# Patient Record
Sex: MALE | Race: WHITE | Employment: UNEMPLOYED | URBAN - METROPOLITAN AREA
[De-identification: names, ages, dates, MRNs, and addresses within clinical notes are randomized per-mention and may not be internally consistent; named-entity substitution may affect disease eponyms.]

---

## 2017-04-28 ENCOUNTER — HOSPITAL ENCOUNTER (OUTPATIENT)
Dept: PHYSICAL THERAPY | Age: 42
Discharge: HOME | End: 2017-04-28
Payer: MEDICAID

## 2017-04-28 PROCEDURE — 97163 PT EVAL HIGH COMPLEX 45 MIN: CPT

## 2017-04-28 PROCEDURE — 97530 THERAPEUTIC ACTIVITIES: CPT

## 2017-04-28 NOTE — PROGRESS NOTES
PHYSICAL THERAPY - DAILY TREATMENT NOTE    Patient Name: Collette Nazario. Date: 2017  : 1975   YES Patient  Verified  Visit #:     Insurance: Payor: [address] [place] [address] / Plan:     / Product Type: Medicaid /      In time: 1:05 Out time: 1:55   Total Treatment Time: 50     Medicare Time Tracking (below)   Total Timed Codes (min):  50 1:1 Treatment Time:  50     TREATMENT AREA =  CVA    SUBJECTIVE    Pain Level (on 0 to 10 scale):  3  / 10   Medication Changes/New allergies or changes in medical history, any new surgeries or procedures? NO    If yes, update Summary List   Subjective Functional Status/Changes:  []  No changes reported     Reports he had a CVA on 2016. States he spent 10 days in [facility] for ~ 80 days (acute care), [facility] X 10 days, and then to [facility] from 2016 to 3/8/2017. Has had HHPT/HHOT until last week. States he is walking short distances at home with someone assisting him. Living with parents now in a 1 story home with ramp to enter. 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 GOAL:  To get motion in L LE and R UE and to get balance back. Reports 3 falls since CVA (2 times at [facility] and once at home when transfering)  Reports he needs assistance with transfers and dressing. Has a transfer bench, hemiwalker, and AFO. Has an aide 6 hours/day. Reports he has noticed increased movement since returning home.           OBJECTIVE    Physical Therapy Evaluation - Neurologic    Posture: [] Poor    [x] Fair    [] Good    Describe:       Gait: [] Normal    [x] Abnormal    Device:  HW    Describe: decreased stride length, trunk flexion/rotation, decreased L hip flexor activity, decreased heel/toe    ROM:                                       AROM    PROM   Knee Left Right Left Right   Ext dec [facility]   Flex Dec [facility] AROM                           PROM  Hip Left Right Left Right   Flex dec [facility] WFL   Ext dec [facility] WFL   ABD dec [facility] [place]   ER dec [facility] WFL WFL   IR dec WFL WFL WFL                                            AROM      PROM   Ankle Left Right Left Right   Ext NT [facility] WFL   Flex NT [facility] WFL     Strength (MMT):                                          Hip L (1-5) R (1-5)   Hip Flexion 0 4   Hip Ext     Hip ABD 0 4   Hip ADD     Hip ER     Hip IR       Knee L (1-5) R (1-5)   Knee Flexion 1 4   Knee Extension 1 4   Ankle PF     Ankle DF     Other       Tone: dec    Motor Control: dec    Balance/ Equilibrium:     Eyes Open Eyes Closed   Romberg 30\" NT           Functional Mobility      Bed Mobility:      Scooting: I       Rolling: mod I       Sit-Supine: Min A for LLE      Transfers:       Sit-Stand: CGA       Floor-Stand: NT           Behavior: [x] Cooperative    [] Impulsive    [] Agitated    [] Perseverative    [] Confused   Oriented x:    Cognition: [] One Step Commands   [x] Multiple Commands   [] Displays Neglect [] R  [] L    Other:       Impaired Judgement: [] Y    [x] N      Impaired Vision:  [x] Y    [] N      Safety Awareness Deficits  [] Y    [x] N      Impaired Hearing  [] Y    [x] N      Able to Express Needs [x] Y    [] N    Optional Tests:       Tinetti Assessment Tool: 10/28      Other Objective/Functional Measures:    See eval    There Act (10 min):  FOTO = 21   Post Treatment Pain Level (on 0 to 10) scale:   3  / 10     ASSESSMENT  Assessment/Changes in Function:     Justification for Eval Code Complexity:  Patient History (low 0, mod 1-2, high 3-4): high (depression, HTN, visual impairments)  Examination (low 1-2, mod 3+, high 4+): high (see above)  Clinical Presentation (low stable or uncomplicated, mod evolving or changing, high unstable or unpredictable): high  Clinical Decision Making (low , mod 26-74, high 1-25): FOTO = 21 high     []  See Progress Note/Recertification Patient will continue to benefit from skilled PT services to modify and progress therapeutic interventions, address functional mobility deficits, address ROM deficits, address strength deficits, analyze and address soft tissue restrictions, analyze and cue movement patterns, analyze and modify body mechanics/ergonomics, assess and modify postural abnormalities, address imbalance/dizziness and instruct in home and community integration to attain remaining goals. Progress toward goals / Updated goals  Goals established. PLAN    [x]  Upgrade activities as tolerated YES Continue plan of care   []  Discharge due to :    []  Other:      Therapist: Luca Meyer, PT    Date: 4/28/2017 Time: 1:11 PM     No future appointments.

## 2017-04-28 NOTE — PROGRESS NOTES
[facility] PHYSICAL THERAPY  [address] [place] [address] Marco A Jacobs, [address] - Phone: [phone]  Fax: [phone] / [phone]  Patient Name: Marcela Goff. 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   ===========================================================================================  Assessment / key information:  Patient is a 35y.o. year old male with chief complaint of immobility following CVA on 2016. Patient reports a history of inpatient rehab at [facility] and at [facility], [facility]. rehab (ending in 3/2017). Patient with significantly impaired use of L UE/LE (strength measurements listed below). He has good dynamic and static sitting balance. He performs supine <> sit transfer with min A for L LE, sit to stand transfer with CGA, WC <> mat transfer with CGA, mod I with rolling to either side. He is able to stand for 30\" without UE support. Gait abnormalities include: anterior lean, decreased stride length, trunk flexion and rotation, decreased activity with L hip flexors, and decreased heel strike on L LE. Tinetti Assessment score = 10/28, indicating high fall risk. Patient with a Functional Status score of 21 on FOTO (Focused on Therapeutic Outcomes), which corresponds to a functional limitation of 79%.   Patient will benefit from skilled PT services to address these issues. Strength (MMT):   Hip L (1-5) R (1-5)   Hip Flexion 0 4   Hip Ext       Hip ABD 0 4   Hip ADD       Hip ER       Hip IR          Knee L (1-5) R (1-5)   Knee Flexion 1 4   Knee Extension 1 4       ===========================================================================================  Eval Complexity: History: HIGH Complexity :3+ comorbidities / personal factors will impact the outcome/ POC Exam:HIGH Complexity : 4+ Standardized tests and measures addressing body structure, function, activity limitation and / or participation in recreation  Presentation: HIGH Complexity : Unstable and unpredictable characteristics  Clinical Decision Making:HIGH Complexity : FOTO score of 1- 25 Overall Complexity:HIGH     Problem List: pain affecting function, decrease ROM, decrease strength, impaired gait/ balance, decrease ADL/ functional abilitiies, decrease activity tolerance, decrease flexibility/ joint mobility and decrease transfer abilities   Treatment Plan may include any combination of the following: Therapeutic exercise, Therapeutic activities, Neuromuscular re-education, Physical agent/modality, Gait/balance training, Manual therapy, Aquatic therapy, Patient education, Self Care training, Functional mobility training, Home safety training and Stair training  Patient / Family readiness to learn indicated by: asking questions, trying to perform skills and interest  Persons(s) to be included in education: patient (P) and family support person (FSP);list father  Barriers to Learning/Limitations: None  Measures taken:    Patient Goal (s): \"be able to use L arm and leg\"   Patient self reported health status: fair  Rehabilitation Potential: good   Short Term Goals: To be accomplished in  2-4  weeks:  1. Patient will ambulate 100 feet with LRAD and no safety concerns to improve safety with household ambulation. 2.  Increase Tinetti score to 12/28 to decrease fall risk.   3.  Patient will be compliant with home exercise program.   Long Term Goals: To be accomplished in  6-8  weeks:  1. Patient will ambulate 250 feet with LRAD and no safety concerns to improve safety with community ambulation. 2.  Increase Tinetti score to 14/28 to decrease fall risk. 3.  Patient will be independent with home exercise program.  4.  Patient will increase FOTO Functional Status score to 47 to indicate decreased functional limitations. Frequency / Duration:   Patient to be seen  2-3  times per week for 4-8  weeks:  Patient / Caregiver education and instruction: self care  G-Codes (GP): MARKIE  Therapist Signature: Isra Manley PT Date: 3/32/0248   Certification Period: NA Time: 2:08 PM   ===========================================================================================  I certify that the above Physical Therapy Services are being furnished while the patient is under my care. I agree with the treatment plan and certify that this therapy is necessary. Physician Signature:        Date:       Time:     Please sign and return to [facility] or you may fax the signed copy to [phone]. Thank you.

## 2017-05-05 ENCOUNTER — APPOINTMENT (OUTPATIENT)
Dept: PHYSICAL THERAPY | Age: 42
End: 2017-05-05
Payer: COMMERCIAL

## 2017-05-09 ENCOUNTER — HOSPITAL ENCOUNTER (OUTPATIENT)
Dept: PHYSICAL THERAPY | Age: 42
Discharge: HOME | End: 2017-05-09
Payer: COMMERCIAL

## 2017-05-09 PROCEDURE — 97116 GAIT TRAINING THERAPY: CPT

## 2017-05-09 PROCEDURE — 97110 THERAPEUTIC EXERCISES: CPT

## 2017-05-09 NOTE — PROGRESS NOTES
PHYSICAL THERAPY - DAILY TREATMENT NOTE    Patient Name: Sharlene Luis. Date: 2017  : 1975   YES Patient  Verified  Visit #:   2   of   12  Insurance: Payor: MEDICAID [facility] / Plan: 1500 / Product Type: Medicaid /      In time: 2:05 Out time: 2:35   Total Treatment Time: 30     Medicare Time Tracking (below)   Total Timed Codes (min):  NA 1:1 Treatment Time:  NA     TREATMENT AREA =  Gait instability [R26.81]  Stroke (cerebrum) ([facility] [I63.9]  SUBJECTIVE    Pain Level (on 0 to 10 scale):  4  / 10   Medication Changes/New allergies or changes in medical history, any new surgeries or procedures? NO    If yes, update Summary List   Subjective Functional Status/Changes:  []  No changes reported     Patient and patient's father report that he has been doing exercises prescribed by HHPT. OBJECTIVE    20 min Therapeutic Exercise:  [x]  See flow sheet   Rationale:      increase ROM, increase strength, improve coordination, improve balance and increase proprioception to improve the patients ability to perform ADLs/IADLs, functional mobility and gait safely and independently without increased pain/symptoms     10 min Gait Training: Amb with HW 80' x 2 with CG   Rationale: improve gait to improve the patient's ability to perform ADLs/IADLs, functional mobility and gait safely and independently without increased pain/symptoms        During TE min Patient Education:  YES  Reviewed HEP   []  Progressed/Changed HEP based on:   Issued HEP (copy in chart)     Other Objective/Functional Measures:     Added several exercises per flowsheet - required A due to L LE weakness  Focused on symmetrical WB with sit to stand     Post Treatment Pain Level (on 0 to 10) scale:   4  / 10     ASSESSMENT    Assessment/Changes in Function:     Tolerated exercises without complaints     []  See Progress Note/Recertification   Patient will continue to benefit from skilled PT services to modify and progress therapeutic interventions, address functional mobility deficits, address ROM deficits, address strength deficits, analyze and address soft tissue restrictions, analyze and cue movement patterns, analyze and modify body mechanics/ergonomics, assess and modify postural abnormalities, address imbalance/dizziness and instruct in home and community integration to attain remaining goals. Progress toward goals / Updated goals:    Progressing toward goals - first visit since evaluation;  1. Patient will ambulate 100 feet with LRAD and no safety concerns to improve safety with household ambulation. - Amb 80' x 2 with HW with CG  2. Increase Tinetti score to 12/28 to decrease fall risk. - Initiated functional mobility/gait training  3.  Patient will be compliant with home exercise program. - Initiated HEP     PLAN    [x]  Upgrade activities as tolerated YES Continue plan of care   []  Discharge due to :    []  Other:      Therapist: Juana Harrington PT    Date: 5/9/2017 Time: 2:51 PM     Future Appointments  Date Time Provider Nanda Pappas   5/12/2017 9:00 AM [address] [place] [address]   5/17/2017 3:30 PM Juana Harrington PT [facility]   5/22/2017 11:30 AM [address] [place] [address]   5/24/2017 10:30 AM Juana Harrington PT [facility]   5/25/2017 1:00 PM ELIOT Bradford/L [facility]   5/31/2017 11:00 AM [address] [place] [address]

## 2017-05-12 ENCOUNTER — HOSPITAL ENCOUNTER (OUTPATIENT)
Dept: PHYSICAL THERAPY | Age: 42
Discharge: HOME | End: 2017-05-12
Payer: COMMERCIAL

## 2017-05-12 PROCEDURE — 97110 THERAPEUTIC EXERCISES: CPT

## 2017-05-12 PROCEDURE — 97530 THERAPEUTIC ACTIVITIES: CPT

## 2017-05-12 NOTE — PROGRESS NOTES
PHYSICAL THERAPY - DAILY TREATMENT NOTE    Patient Name: Sharlene Luis. Date: 2017  : 1975   YES Patient  Verified  Visit #:   3   of   12  Insurance: Payor: MEDICAID [facility] / Plan: 1500 / Product Type: Medicaid /      In time: 9:05 Out time: 9:45   Total Treatment Time: 40     Medicare Time Tracking (below)   Total Timed Codes (min):  na 1:1 Treatment Time:  na     TREATMENT AREA =  Gait instability [R26.81]  Stroke (cerebrum) ([facility]) [I63.9]    SUBJECTIVE    Pain Level (on 0 to 10 scale):  4  / 10   Medication Changes/New allergies or changes in medical history, any new surgeries or procedures? NO    If yes, update Summary List   Subjective Functional Status/Changes:  []  No changes reported     Pt states that his L knee has been really sore for the past couple of days, not sure if it was from the PT exercises or from where his knee buckled on him when he was walking. OBJECTIVE    25 min Therapeutic Exercise:  [x]  See flow sheet   Rationale:     increase ROM, increase strength, improve coordination, improve balance and increase proprioception to promote increased functional mobility and increased activity tolerance with ADL's. 15 min Therapeutic Activity: Gait 61' with HW and SBA   Standing without UE A  Standing WSing   Rationale:   improve coordination, improve balance and increase proprioception to improve the patients ability to perform ADLs, transfers and gait safely and independently. min Patient Education:  YES  Reviewed HEP   [x]  Progressed/Changed HEP based on: Added gastroc stretch to HEP     Other Objective/Functional Measures:    Cued pt for L knee extension with stance phase of gait and with standing ex. Attempted TKE in standing, pt unable to achieve TKE. Increased height to 4\" for tap ups. Pt required 1 UE A for balance. Added PNF in supine. Pt's L knee flexion limited secondary to ms tightness/tone.      Post Treatment Pain Level (on 0 to 10) scale:   4  / 10     ASSESSMENT    Assessment/Changes in Function:     Pt unable to achieve L knee TKE in standing. []  See Progress Note/Recertification   Patient will continue to benefit from skilled PT services to modify and progress therapeutic interventions, address functional mobility deficits, address ROM deficits, address strength deficits, analyze and address soft tissue restrictions, analyze and cue movement patterns, analyze and modify body mechanics/ergonomics, assess and modify postural abnormalities, address imbalance/dizziness and instruct in home and community integration to attain remaining goals. Progress toward goals / Updated goals:    Progressing toward goals - first visit since evaluation;  1. Patient will ambulate 100 feet with LRAD and no safety concerns to improve safety with household ambulation. - Amb with HW and SBA to CGA  2. Increase Tinetti score to 12/28 to decrease fall risk. - Initiated functional mobility/gait training  3.  Patient will be compliant with home exercise program. - Initiated HEP     PLAN    []  Upgrade activities as tolerated YES Continue plan of care   []  Discharge due to :    []  Other:      Therapist: Howard Godinez PTA    Date: 5/12/2017 Time: 9:23 AM     Future Appointments  Date Time Provider Nanda Pappas   5/17/2017 3:30 PM Darvin Felipe PT [facility]   5/22/2017 11:30 AM [address] [place] [address]   5/24/2017 10:30 AM Darvin Felipe PT [facility]   5/25/2017 1:00 PM ELIOT Saldivar/L [facility]   5/31/2017 11:00 AM [address] [place] [address]

## 2017-05-17 ENCOUNTER — HOSPITAL ENCOUNTER (OUTPATIENT)
Dept: PHYSICAL THERAPY | Age: 42
Discharge: HOME | End: 2017-05-17
Payer: COMMERCIAL

## 2017-05-17 PROCEDURE — 97110 THERAPEUTIC EXERCISES: CPT

## 2017-05-17 PROCEDURE — 97530 THERAPEUTIC ACTIVITIES: CPT

## 2017-05-17 NOTE — PROGRESS NOTES
PHYSICAL THERAPY - DAILY TREATMENT NOTE    Patient Name: Ladonna Somers. Date: 2017  : 1975   YES Patient  Verified  Visit #:      12  Insurance: Payor: [address] [place] [address] / Plan: [address] [place] [address]   / Product Type: Medicaid /      In time: 2:45 Out time: 3:30   Total Treatment Time: 45     Medicare Time Tracking (below)   Total Timed Codes (min):  45 1:1 Treatment Time:  45     TREATMENT AREA =  Gait instability [R26.81]  Stroke (cerebrum) (HCC) [I63.9]  SUBJECTIVE    Pain Level (on 0 to 10 scale):  0  / 10   Medication Changes/New allergies or changes in medical history, any new surgeries or procedures? NO    If yes, update Summary List   Subjective Functional Status/Changes:  []  No changes reported     Pt reports that he has had increased muscle soreness since beginning PT. Pt's father reports that he has been doing HEP 2-3 times per day (LAQ, seated march, mini-squats, standing lateral WS, standing stagger stance A-P WS, seated hip abd/add, HS stretch and UE exercises).           OBJECTIVE    25 min Therapeutic Exercise:  [x]  See flow sheet   Rationale:      increase ROM, increase strength and increase proprioception to improve the patients ability to perform ADLs/IADLs, functional mobility and gait safely and independently without increased pain/symptoms     20 min Therapeutic Activity: Sit to stand, amb with HW   Rationale: improve functional mobility/gait to improve the patient's ability to perform ADLs/IADLs, functional mobility and gait safely and independently without increased pain/symptoms      During TE min Patient Education:  YES  Reviewed HEP   []  Progressed/Changed HEP based on:   Cont HEP; decrease frequency to 1-2 times per day due to c/o increased muscle soreness     Other Objective/Functional Measures:    Transferred sit to stand with S to CG and ambulated with HW and L AFO with S to CG     Post Treatment Pain Level (on 0 to 10) scale:   0  / 10 ASSESSMENT    Assessment/Changes in Function:     Tolerated exercises without complaints     []  See Progress Note/Recertification   Patient will continue to benefit from skilled PT services to modify and progress therapeutic interventions, address functional mobility deficits, address ROM deficits, address strength deficits, analyze and address soft tissue restrictions, analyze and cue movement patterns, analyze and modify body mechanics/ergonomics, assess and modify postural abnormalities, address imbalance/dizziness and instruct in home and community integration to attain remaining goals. Progress toward goals / Updated goals:    Progressing toward goals:  1. Patient will ambulate 100 feet with LRAD and no safety concerns to improve safety with household ambulation. - Amb with HW and SBA to CGA  2. Increase Tinetti score to 12/28 to decrease fall risk. - Cont functional mobility/gait training and LE strengthening ex to facilitate functional mobility/gait  3.  Patient will be compliant with home exercise program. - Initiated HEP     PLAN    [x]  Upgrade activities as tolerated YES Continue plan of care   []  Discharge due to :    []  Other:      Therapist: Mariel Monreal PT    Date: 5/17/2017 Time: 2:44 PM     Future Appointments  Date Time Provider Nanda Pappas   5/17/2017 3:30 PM Mariel Monreal PT [facility]   5/22/2017 11:30 AM [address] [place] [address]   5/24/2017 10:30 AM Mariel Monreal PT [facility]   5/25/2017 1:00 PM ELIOT Daugherty/L [facility]   5/31/2017 11:00 AM [address] [place] [address]

## 2017-05-22 ENCOUNTER — HOSPITAL ENCOUNTER (OUTPATIENT)
Dept: PHYSICAL THERAPY | Age: 42
Discharge: HOME | End: 2017-05-22
Payer: COMMERCIAL

## 2017-05-22 PROCEDURE — 97530 THERAPEUTIC ACTIVITIES: CPT

## 2017-05-22 PROCEDURE — 97110 THERAPEUTIC EXERCISES: CPT

## 2017-05-22 NOTE — PROGRESS NOTES
PHYSICAL THERAPY - DAILY TREATMENT NOTE    Patient Name: Augustus Bhagat. 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 NO    If yes, update Summary List   Subjective Functional Status/Changes:  []  No changes reported     Pt reports that he got sick last night about 3 AM and was feeling wobbly when he was sitting on the toilet. Pt states that he is feeling better now and feels up to doing PT. Pt's aide reports that he traveled over the weekend and is tired from that. OBJECTIVE    20 min Therapeutic Exercise:  [x]  See flow sheet   Rationale:    increase ROM, increase strength, improve coordination, improve balance and increase proprioception to promote increased functional mobility and increased activity tolerance with ADL's. 15 min Therapeutic Activity: 61' with HW and SBA  sit<>stand from elevated mat without UE A   Bed mobility   Rationale:  improve coordination, improve balance and increase proprioception to improve the patients ability to perform ADLs, transfers and gait safely and independently. min Patient Education:  YES  Reviewed HEP   []  Progressed/Changed HEP based on:   Informed pt and pt's aide that representative from [address] [place] [address] to attend PT appt  @ 10:30     Other Objective/Functional Measures:    Attempted NuStep today, pt reports unable secondary to ms spasms L thigh. Pt with c/o increased ms spasms L thigh with supine positioning.  HL positioning more comfortable and pt able to perform mat ex without increased sx's. Pt required max A for trunk and L LE with scooting in bed and min A for L LE and trunk with transferring supine to sit. Cued pt for bridging with bed mobility. Pt demo's L hip ER when engages L glutes in standing. Performed manual quad stretch (R S/L) and piriformis stretch prior to performing PNF ex. Post Treatment Pain Level (on 0 to 10) scale:   3  / 10     ASSESSMENT    Assessment/Changes in Function:     Pt continues with c/o L LE ms tightness and spasms that limit his activity tolerance and functional mobility (transfers and bed mobility). []  See Progress Note/Recertification   Patient will continue to benefit from skilled PT services to modify and progress therapeutic interventions, address functional mobility deficits, address ROM deficits, address strength deficits, analyze and address soft tissue restrictions, analyze and cue movement patterns, analyze and modify body mechanics/ergonomics, assess and modify postural abnormalities, address imbalance/dizziness and instruct in home and community integration to attain remaining goals. Progress toward goals / Updated goals:    Progressing toward goals:  1. Patient will ambulate 100 feet with LRAD and no safety concerns to improve safety with household ambulation. - Amb with HW and SBA to CGA  2. Increase Tinetti score to 12/28 to decrease fall risk. - Cont functional mobility/gait training and LE strengthening ex to facilitate functional mobility/gait  3.  Patient will be compliant with home exercise program. - Initiated HEP     PLAN    []  Upgrade activities as tolerated YES Continue plan of care   []  Discharge due to :    []  Other:      Therapist: Leonor Almaguer PTA    Date: 5/22/2017 Time: 3:56 PM     Future Appointments  Date Time Provider Nanda Pappas   5/24/2017 10:30 AM Renaldo Stephen, PT [facility]   5/25/2017 1:00 PM Jay Stapleton Bella Luna, OTR/L [facility]   5/31/2017 11:00 AM [address] [place] [address]   6/1/2017 10:00 AM Mayela Officer, OTR/L [facility]   6/1/2017 10:30 AM [address] [place] [address]   6/6/2017 10:00 AM Mayela Officer, OTR/L [facility]   6/6/2017 10:30 AM [address] [place] [address]   6/8/2017 10:00 AM Mayela Officer, OTR/L [facility]   6/8/2017 10:30 AM [address] [place] [address]   6/13/2017 1:00 PM Jose Saint Paul, [facility]   6/13/2017 1:30 PM Mayela Officer, OTR/L [facility]   6/15/2017 10:00 AM Mayela Officer, OTR/L [facility]   6/15/2017 10:30 AM Jose Saint Paul, [facility]   6/20/2017 10:00 AM Mayela Officer, OTR/L [facility]   6/20/2017 10:30 AM Meenakshi [facility]   6/22/2017 11:00 AM Monica Moran [facility]   6/22/2017 11:30 AM Mayela Officer, OTR/L [facility]   6/27/2017 11:00 AM Jose Saint Paul, PT [facility]   6/27/2017 11:30 AM Mayela Officer, OTR/L [facility]   6/29/2017 11:00 AM Jose Saint Paul, PT [facility]   6/29/2017 11:30 AM Mayela Officer, OTR/L [facility]

## 2017-05-24 ENCOUNTER — HOSPITAL ENCOUNTER (OUTPATIENT)
Dept: PHYSICAL THERAPY | Age: 42
Discharge: HOME | End: 2017-05-24
Payer: COMMERCIAL

## 2017-05-24 PROCEDURE — 97112 NEUROMUSCULAR REEDUCATION: CPT

## 2017-05-24 PROCEDURE — 97116 GAIT TRAINING THERAPY: CPT

## 2017-05-24 NOTE — PROGRESS NOTES
PHYSICAL THERAPY - DAILY TREATMENT NOTE    Patient Name: Sd Hawkins. Date: 2017  : 1975   YES Patient  Verified  Visit #:     Insurance: Payor: [address] [place] [address] / Plan: [address] [place] [address]   / Product Type: Medicaid /      In time: 10:30 Out time: 11:00   Total Treatment Time: 30     Medicare Time Tracking (below)   Total Timed Codes (min):  30 1:1 Treatment Time:  30     TREATMENT AREA =  Gait instability [R26.81]  Stroke (cerebrum) (HCC) [I63.9]  SUBJECTIVE    Pain Level (on 0 to 10 scale):  0  / 10   Medication Changes/New allergies or changes in medical history, any new surgeries or procedures? NO    If yes, update Summary List   Subjective Functional Status/Changes:  []  No changes reported     Pt without complaints.           OBJECTIVE    15 min Neuromuscular Re-ed: Exercises per flowsheet   Rationale: increase strength, improve coordination and improve balance to improve the patients ability to perform ADLs/IADLs, functional mobility and gait safely and independently without increased pain/symptoms        15 min Gait Training: Amb with HW and L AFO with S to CG (75' x 3, 50' x 1)   Rationale: improve gait to improve the patient's ability to perform ADLs/IADLs, functional mobility and gait safely and independently without increased pain/symptoms       T/o tx min Patient Education:  YES  Reviewed HEP   []  Progressed/Changed HEP based on:   Cont HEP     Other Objective/Functional Measures:    Demonstrates R lateral lean with increased WB R LE, decreased L hip/knee flexion and decreased L foot clearance during ambulation with HW and L AFO  Requires A with LAQ, seated hip flex, seated hip abd/add  Requires A to maintain L hip/knee extension during tap-ups with R LE     Post Treatment Pain Level (on 0 to 10) scale:   0  / 10     ASSESSMENT    Assessment/Changes in Function:     Tolerated exercises without complaints     []  See Progress Note/Recertification Patient will continue to benefit from skilled PT services to modify and progress therapeutic interventions, address functional mobility deficits, address ROM deficits, address strength deficits, analyze and address soft tissue restrictions, analyze and cue movement patterns, analyze and modify body mechanics/ergonomics, assess and modify postural abnormalities, address imbalance/dizziness and instruct in home and community integration to attain remaining goals. Progress toward goals / Updated goals:    Progressing toward goals:  1. Patient will ambulate 100 feet with LRAD and no safety concerns to improve safety with household ambulation. - Amb with HW and S to CGA  2. Increase Tinetti score to 12/28 to decrease fall risk. - Cont functional mobility/gait training and LE strengthening ex to facilitate functional mobility/gait  3.  Patient will be compliant with home exercise program. -Cont HEP     PLAN    [x]  Upgrade activities as tolerated YES Continue plan of care   []  Discharge due to :    [x]  Other: Reassess [place]     Therapist: Luz Isaac PT    Date: 5/24/2017 Time: 10:22 AM     Future Appointments  Date Time Provider Nanda Pappas   5/24/2017 10:30 AM Luz Isaac PT [facility]   5/25/2017 1:00 PM Shanti Kinney OTR/L [facility]   5/31/2017 11:00  [facility]   6/1/2017 10:00 AM Shanti Kinney, OTR/L [facility]   [facility]   6/6/2017 10:00 AM Shanti Kinney, OTR/L [facility]   6/6/2017 10:30 AM Meenakshi [facility]   6/8/2017 10:00 AM Shanti Kinney OTR/L [facility]   6/8/2017 10:30 AM Meenakshi [facility]   6/13/2017 1:00 PM Luz Isaac PT [facility]   6/13/2017 1:30 PM Shanti Kinney OTR/L [facility]   6/15/2017 10:00 AM Shanti Kinney OTR/L [facility]   6/15/2017 10:30 AM Luz Isaac PT [facility]   6/20/2017 10:00 AM ELIOT Santa/ASHLEIGH [facility]   6/22/2017 11:00 AM Gabo Alvarado [facility]   6/22/2017 11:30 AM Shanti Kinney OTR/L [facility]   6/27/2017 11:00 AM Luz Isaac PT [facility]   6/27/2017 11:30 AM Shanti Kinney OTR/ASHLEIGH [facility]   6/29/2017 11:00 AM Luz Isaac PT [facility]   6/29/2017 11:30 AM Shanti Kinney OTR/L [facility]

## 2017-05-25 ENCOUNTER — HOSPITAL ENCOUNTER (OUTPATIENT)
Dept: PHYSICAL THERAPY | Age: 42
Discharge: HOME | End: 2017-05-25
Payer: COMMERCIAL

## 2017-05-25 PROCEDURE — 97166 OT EVAL MOD COMPLEX 45 MIN: CPT

## 2017-05-25 NOTE — PROGRESS NOTES
OCCUPATIONAL THERAPY - DAILY TREATMENT NOTE    Patient Name: Marcela Goff. Date: 2017  : 1975   YES Patient  Verified  Visit #:      of   8  Insurance: Payor: [address] [place] [address] / Plan: 1500    / Product Type: Medicaid /      In time: 1:10 Out time: 2:00   Total Treatment Time: 50     TREATMENT AREA =  LUE    SUBJECTIVE    Pain Level (on 0 to 10 scale):  0  / 10   Medication Changes/New allergies or changes in medical history, any new surgeries or procedures? NO    If yes, update Summary List   Subjective Functional Status/Changes:  []  No changes reported     Dad reports: He has an aide that helps him 5 hours a day 6 days a week. OBJECTIVE     min Therapeutic Exercise:  [x]  See flow sheet        min Patient Education:  YES  Reviewed HEP- ADLs   []  Progressed/Changed HEP based on: Other Objective/Functional Measures:    See initial eval for details     Post Treatment Pain Level (on 0 to 10) scale:   0  / 10     ASSESSMENT  Assessment/Changes in Function:     Patient presents with nonfunctional LUE and dependence with ADLs and should benefit from skilled OT to address deficits. [x]  See Progress Note/Recertification   Patient will continue to benefit from skilled OT services to address ROM deficits, address strength deficits and ADLs to attain remaining goals.    Progress toward goals / Updated goals:    See eval goals for details      PLAN  [x]  Upgrade activities as tolerated YES Continue plan of care   []  Discharge due to :    [x]  Other: OT 2x week x 4 weeks for goals     Therapist: SHAWN Lema    Date: 2017 Time: 2:15 PM

## 2017-05-25 NOTE — PROGRESS NOTES
[facility] PHYSICAL THERAPY  [address] [place] [address] Reyn[place]do, [address] - Phone: [phone]  Fax: [phone] / 166 [facility]  Patient Name: Ashely Mack. : 1975   Medical   Diagnosis: Generalized muscle weakness [M62.81]  Stroke (cerebrum) ([address] [I63.9] Treatment Diagnosis: CVA L shea   Onset Date: 2016     Referral Source: Gonzales Moore DO Start of Care [facility]): 2017   Prior Hospitalization: See medical history Provider #: [id number]   Prior Level of Function: Ind   Comorbidities: HTN   Medications: Verified on Patient Summary List   The Plan of Care and following information is based on the information from the initial evaluation.   ===========================================================================================  Assessment / key information: Patient is a 34 yo right handed male s/p right CVA with left dense hemiparesis. No AROM noted LUE, trace scapular elevation. No functional LUE skills. Sensation impaired to light touch and localization. Pain 0-2/10. No LUE edema. ADLs- Mod A with bathing and max A with dressing.   ===========================================================================================  Eval Complexity: History: MEDIUM Complexity : Expanded review of history including physical, cognitive and psychosocial  history ; Examination: MEDIUM Complexity : 3-5 performance deficits relating to physical, cognitive , or psychosocial skils that result in activity limitations and / or participation restrictions; Decision Making:MEDIUM Complexity : Patient may present with comorbidities that affect occupational performnce.  Miniml to moderate modification of tasks or assistance (eg, physical or verbal ) with assesment(s) is necessary to enable patient to complete evaluation   Problem List: Decreased range of motion, Decreased strength, Decreased coordination/prehension, Decreased ADL/functional abilities  and Decreased transfer abilities   Treatment Plan may include any combination of the following: Therapeutic exercise, Therapeutic activities, Neuromuscular re-education, Physical agent/modality, Manual therapy, Patient education and ADLs/IADLs  Patient / Family readiness to learn indicated by: asking questions, trying to perform skills and interest  Persons(s) to be included in education: patient (P) and family support person (FSP);list parents  Barriers to Learning/Limitations: None  Measures taken:    Patient Goal (s): Live on my own by March. Patient self reported health status: fair  Rehabilitation Potential: fair   Short Term Goals: To be accomplished in 4 weeks:  1. Ind with HEP to maximize rehab potential  2.50% shoulder elevation to assist with doffing UB clothing   3. Set up for UB dressing   4. Education awareness for NMES for LUE           Long Term Goals: to be accomplished in 6-8 weeks:  1. Min A with pants  2. Don socks with set up  3. Min A with shoes  4. Good awareness of AE to increase Ind with ADLs    Frequency / Duration:   Patient to be seen 2  times per week for 4  weeks:  Patient / Caregiver education and instruction: self care  G-Codes (GO): na  Therapist Signature: SHAWN Daugherty Date: 1/20/0631   Certification Period: na Time: 2:16 PM   ===========================================================================================  I certify that the above Occupational Therapy Services are being furnished while the patient is under my care. I agree with the treatment plan and certify that this therapy is necessary. Physician Signature:        Date:       Time:     Please sign and return to [facility] or you may fax the signed copy to [phone]. Thank you.

## 2017-05-31 ENCOUNTER — HOSPITAL ENCOUNTER (OUTPATIENT)
Dept: PHYSICAL THERAPY | Age: 42
Discharge: HOME | End: 2017-05-31
Payer: COMMERCIAL

## 2017-05-31 PROCEDURE — 97530 THERAPEUTIC ACTIVITIES: CPT

## 2017-05-31 PROCEDURE — 97110 THERAPEUTIC EXERCISES: CPT

## 2017-05-31 NOTE — PROGRESS NOTES
PHYSICAL THERAPY - DAILY TREATMENT NOTE    Patient Name: Kay Craig. Date: 2017  : 1975   YES Patient  Verified  Visit #:     Insurance: Payor: [address] [place] [address] / Plan:     / Product Type: Medicaid /      In time: 11:00 Out time: 11:45   Total Treatment Time: 45     Medicare Time Tracking (below)   Total Timed Codes (min):  na 1:1 Treatment Time:  na     TREATMENT AREA =  Gait instability [R26.81]  Stroke (cerebrum) (HCC) [I63.9]    SUBJECTIVE    Pain Level (on 0 to 10 scale):  1  / 10   Medication Changes/New allergies or changes in medical history, any new surgeries or procedures? NO    If yes, update Summary List   Subjective Functional Status/Changes:  []  No changes reported     Pt c/o soreness/stiffness L knee. Pt states the his L thigh muscle cramps up on him when he's getting in and out of bed. OBJECTIVE    20 min Therapeutic Exercise:  [x]  See flow sheet   Rationale:    increase ROM, increase strength, improve coordination, improve balance and increase proprioception to promote increased functional mobility and increased activity tolerance with ADL's.    25 min Therapeutic Activity: Tinetti   100' X 2 with HW and S   Rationale:  gait/balance assessment     min Patient Education:  YES  Reviewed HEP   [x]  Progressed/Changed HEP based on:   Updated HEP, see HO in chart. Other Objective/Functional Measures:    Performed manual stretch for L piriformis, HS and gastroc. Demo technique for aide and provided HO with instruction for reps and hold time. Tinetti:      Post Treatment Pain Level (on 0 to 10) scale:   1  / 10     ASSESSMENT    Assessment/Changes in Function:     Pt's Tinetti score improved 2 points since IE indicating decreased fall risk. No LOB or safety concerns noted with amb.       [x]  See Progress Note/Recertification   Patient will continue to benefit from skilled PT services to modify and progress therapeutic interventions, address functional mobility deficits, address ROM deficits, address strength deficits, analyze and address soft tissue restrictions, analyze and cue movement patterns, analyze and modify body mechanics/ergonomics, assess and modify postural abnormalities, address imbalance/dizziness and instruct in home and community integration to attain remaining goals. Progress toward goals / Updated goals:    Met STG's-see PN     PLAN    []  Upgrade activities as tolerated YES Continue plan of care   []  Discharge due to :    [x]  Other: Bioness consult tomorrow.       Therapist: Jatinder Wilson PTA    Date: 5/31/2017 Time: 11:53 AM     Future Appointments  Date Time Provider Nanda Pappas   6/1/2017 10:00 AM Shepherd Fass, OTR/L [facility]   6/1/2017 10:30 AM Count includes the [facility]   6/6/2017 10:00 AM Shepherd Fass, OTR/[facility]   6/6/2017 10:30 AM [address] [place] [address]   6/8/2017 10:00 AM Shepherd Fass, OTR/L [facility]   6/8/2017 10:30 AM Harjit Mtata [facility]   6/13/2017 1:00 PM Estrella Jennings [facility]   6/13/2017 1:30 PM Shepherd Fass, OTR/L [facility]   6/15/2017 10:00 AM Shepherd Fass, OTR/L [facility]   6/15/2017 10:30 AM Estrella Jennings PT [facility]   6/20/2017 10:00 AM Shepherd Fass, OTR/L [facility]   6/20/2017 10:30 AM Count includes the [facility]   6/22/2017 11:00 AM Count includes the [facility]   6/22/2017 11:30 AM Shepherd Fass, OTR/L [facility]   6/27/2017 11:00 AM Estrella Jennings PT [facility]   6/27/2017 11:30 AM ELIOT Alarcon/ASHLEIGH [facility]   6/29/2017 11:00 AM Estrella Jennings PT [facility]   6/29/2017 11:30 AM ELIOT Alarcon/ASHLEIGH [facility]

## 2017-05-31 NOTE — PROGRESS NOTES
[address] PHYSICAL THERAPY  [address] [place] [address] Manjeet Jacobs, [address] - Phone: [phone]  Fax: [phone]  PROGRESS NOTE  Patient Name: Galina Jensen. : 1975   Treatment/Medical Diagnosis: Gait instability [R26.81]  Stroke (cerebrum) [facility]) [I63.9]   Referral Source: [facility]     Date of Initial Visit: 2017 Attended Visits: 7 Missed Visits: 1     SUMMARY OF TREATMENT  Pt's treatment has consisted of gait and balance training, LE stretching for flexibility/ROM, LE strengthening exercises, pt education and instruction in HEP. CURRENT STATUS  Pt progressing slowly with ther-ex and  is limited secondary to L LE ms tone. Pt ambulating consistently more than 100 feet with HW and S. Pt's Tinetti balance score improved 2 points since initial evaluation indicating decreased fall risk. Pt is scheduled to have a consult with [facility] for gait assessment. Pt continues to demo decreased [address] [place] [address] with standing activity and transfers. Pt demo's impaired balance strategy with static and dynamic therapeutic activity. Goal/Measure of Progress Goal Met?   1.  1. Patient will ambulate 100 feet with LRAD and no safety concerns to improve safety with household ambulation. Status at last Eval: CGA with HW Current Status: S with HW yes   2.  2. Increase Tinetti score to 12/28 to decrease fall risk. Status at last Eval: Tinetti 10/28 Current Status: Tinetti  yes   3.  3. Patient will be compliant with home exercise program.   Status at last Eval: HEP established  Current Status: Pt compliant with HEP yes     New Goals to be achieved in __4__  weeks:  1.  1. Patient will ambulate 250 feet with LRAD and no safety concerns to improve safety with community ambulation. 2.  2. Increase Tinetti score to 14/28 to decrease fall risk.    3.  3. Patient will be independent with home exercise program.   4.  4. Patient will increase FOTO Functional Status score to 47 to indicate decreased functional limitations. RECOMMENDATIONS  Pt would benefit from continued therapy to address L LE strength/ROM impairments and balance deficits to improve gait mechanics for increased safety and I gait, transfers and functional mobility. If you have any questions/comments please contact us directly at [phone]. Thank you for allowing us to assist in the care of your patient. LPTA Signature: Duy Grider PTA  Date: 5/31/2017   PT Signature:  Time: 12:02 PM   NOTE TO PHYSICIAN:  PLEASE COMPLETE THE ORDERS BELOW AND FAX TO   [facility]: [phone]. If you are unable to process this request in 24 hours please contact our office: [phone].    ___ I have read the above report and request that my patient continue as recommended.   ___ I have read the above report and request that my patient continue therapy with the following changes/special instructions:_________________________________________________________   ___ I have read the above report and request that my patient be discharged from therapy.      Physician Signature:        Date:       Time:

## 2017-06-01 ENCOUNTER — HOSPITAL ENCOUNTER (OUTPATIENT)
Dept: PHYSICAL THERAPY | Age: 42
Discharge: HOME | End: 2017-06-01
Payer: COMMERCIAL

## 2017-06-01 PROCEDURE — 97116 GAIT TRAINING THERAPY: CPT

## 2017-06-01 PROCEDURE — 97110 THERAPEUTIC EXERCISES: CPT

## 2017-06-06 ENCOUNTER — HOSPITAL ENCOUNTER (OUTPATIENT)
Dept: PHYSICAL THERAPY | Age: 42
Discharge: HOME | End: 2017-06-06
Payer: COMMERCIAL

## 2017-06-06 PROCEDURE — 97110 THERAPEUTIC EXERCISES: CPT

## 2017-06-06 PROCEDURE — 97530 THERAPEUTIC ACTIVITIES: CPT

## 2017-06-06 NOTE — PROGRESS NOTES
OCCUPATIONAL THERAPY - DAILY TREATMENT NOTE    Patient Name: Marcela Goff. Date: 2017  : 1975   YES Patient  Verified  Visit #:   3   of   8  Insurance: Payor: [place] / Plan: 1500 / Product Type: Medicaid /      In time: 10:05 Out time: 10:30   Total Treatment Time: 25     TREATMENT AREA =  LUE    SUBJECTIVE    Pain Level (on 0 to 10 scale):  3  / 10   Medication Changes/New allergies or changes in medical history, any new surgeries or procedures? NO    If yes, update Summary List   Subjective Functional Status/Changes:  []  No changes reported     The doctor is changing my muscle meds so my knee is tight. OBJECTIVE    15 min Therapeutic Exercise:  [x]  See flow sheet   Rationale:      increase ROM to improve the patients ability to use left arm     10 min Therapeutic Activity: UB dressing    Rationale:    increase ROM to improve the patients ability to dress     min Patient Education:  YES  Reviewed HEP   []  Progressed/Changed HEP based on: Other Objective/Functional Measures:    Patient able to don overhead shirt with min A. CG present and advised to allow Ind with dressing. Post Treatment Pain Level (on 0 to 10) scale:   3  / 10     ASSESSMENT  Assessment/Changes in Function:     Improving ADLs     []  See Progress Note/Recertification   Patient will continue to benefit from skilled OT services to address ROM deficits and ADLs to attain remaining goals. Progress toward goals / Updated goals:     Working toward dressing goals     PLAN  [x]  Upgrade activities as tolerated YES Continue plan of care   []  Discharge due to :    []  Other:      Therapist: ELIOT Lema/ASHLEIGH    Date: 2017 Time: 9:57 AM

## 2017-06-06 NOTE — PROGRESS NOTES
PHYSICAL THERAPY - DAILY TREATMENT NOTE    Patient Name: Sher Leon Date: 2017  : 1975   YES Patient  Verified  Visit #:     Insurance: Payor: MEDICAID [facility] / Plan: 1500 / Product Type: Medicaid /      In time: 10:30 Out time: 11:00   Total Treatment Time: 30     Medicare Time Tracking (below)   Total Timed Codes (min):  30 1:1 Treatment Time:  30     TREATMENT AREA =  Gait instability [R26.81]  Stroke (cerebrum) (HCC) [I63.9]    SUBJECTIVE    Pain Level (on 0 to 10 scale):  4  / 10   Medication Changes/New allergies or changes in medical history, any new surgeries or procedures? NO    If yes, update Summary List   Subjective Functional Status/Changes:  []  No changes reported     Pt states that he is going to call [employer] today and talk about getting it, he really liked walking with it on. Pt reports that he got in his pool over the weekend and did some walking, he got really tired from it and had some knee pain. Pt states he went fwd (face first) into the water and wasn't able to get himself up without A and also needed help getting out of the pool. Pt reports that his tone is really bad today, his doctor is adjusting his medications some because of how tired he's been. OBJECTIVE    10 min Therapeutic Exercise:  [x]  See flow sheet   Rationale:  increase ROM, increase strength, improve coordination, improve balance and increase proprioception to promote increased functional mobility and increased activity tolerance with ADL's. 15 min Therapeutic Activity: Stance working on balance (no UE A) and even WBing B LE's   Gait negotiating    Rationale:   improve coordination, improve balance and increase proprioception to improve the patients ability to perform ADLs, transfers and gait safely and independently.      5 min Patient Education:  YES  Reviewed HEP   [x]  Progressed/Changed HEP based on:   HO for neutral standing posture, see copy in chart. Educated pt on activities to trial in pool such as side stepping, marching, mini squats and safety      Other Objective/Functional Measures:    VCing and TCing for foot placement and WSing with attempting stance without UE A. Pt able to maintain stance X 30\" without UE support. Pt able to negotiate around cones with min VCing for safety awareness and gait sequencing. Practiced sit<>stand with even WBing. Therapist provided TCing to L LE to facilitate increased WBing and to help manage extensor tone. Post Treatment Pain Level (on 0 to 10) scale:   4 / 10     ASSESSMENT    Assessment/Changes in Function:     Pt able to demo stance X 30\" without UE support. []  See Progress Note/Recertification   Patient will continue to benefit from skilled PT services to  modify and progress therapeutic interventions, address functional mobility deficits, address ROM deficits, address strength deficits, analyze and address soft tissue restrictions, analyze and cue movement patterns, analyze and modify body mechanics/ergonomics, assess and modify postural abnormalities, address imbalance/dizziness and instruct in home and community integration to attain remaining goals. Progress toward goals / Updated goals:    Updated goals from last assessment:  1.  1. Patient will ambulate 250 feet with LRAD and no safety concerns to improve safety with community ambulation. Pt amb with L AFO and HW for [facility] and community distances   2.  2. Increase Tinetti score to 14/28 to decrease fall risk. Working on sit<> with decrease UE A   3.  3. Patient will be independent with home exercise program.   4.  4. Patient will increase FOTO Functional Status score to 47 to indicate decreased functional limitations.  Continue functional gait training and strengthening for improved functional mobility.            PLAN    []  Upgrade activities as tolerated YES Continue plan of care   []  Discharge due to :    []  Other: Therapist: Glenn Cruz PTA    Date: 6/6/2017 Time: 3:42 PM     Future Appointments  Date Time Provider Nanda Pappas   6/8/2017 10:00 AM Allen Greens, OTR/L [facility]   6/8/2017 10:30 AM Estela CAN [facility]   6/13/2017 1:00 PM Elías Alvarado [facility]   6/13/2017 1:30 PM Allen Esquivel, OTR/L [facility]   6/15/2017 10:00 AM Allen Esquivel, OTR/L [facility]   6/15/2017 10:30 AM Elías Alvarado [facility]   6/20/2017 10:00 AM Allen Esquivel, OTR/L [facility]   6/20/2017 10:30 AM [facility]   6/22/2017 11:00 AM [facility]   6/22/2017 11:30 AM Allen Greens, OTR/L [facility]   6/27/2017 11:00 AM Elías Alvarado [facility]   6/27/2017 11:30 AM Allen Esquivel, OTR/L [facility]   6/29/2017 11:00 AM Elías Alvarado [facility]   6/29/2017 11:30 AM Allen Esquivel, OTR/L [facility]

## 2017-06-08 ENCOUNTER — HOSPITAL ENCOUNTER (OUTPATIENT)
Dept: PHYSICAL THERAPY | Age: 42
Discharge: HOME | End: 2017-06-08
Payer: COMMERCIAL

## 2017-06-08 PROCEDURE — 97110 THERAPEUTIC EXERCISES: CPT

## 2017-06-08 PROCEDURE — 97530 THERAPEUTIC ACTIVITIES: CPT

## 2017-06-08 NOTE — PROGRESS NOTES
OCCUPATIONAL THERAPY - DAILY TREATMENT NOTE    Patient Name: Sharlene Luis. Date: 2017  : 1975   YES Patient  Verified  Visit #:   4   of   8  Insurance: Payor: [place] / Plan: 1500 / Product Type: Medicaid /      In time: 10:00 Out time: 10:30   Total Treatment Time: 30     TREATMENT AREA =  LUE    SUBJECTIVE    Pain Level (on 0 to 10 scale):  0  / 10   Medication Changes/New allergies or changes in medical history, any new surgeries or procedures? NO    If yes, update Summary List   Subjective Functional Status/Changes:  []  No changes reported   I may get botox. OBJECTIVE    30 min Therapeutic Exercise:  [x]  See flow sheet   Rationale:      increase ROM to improve the patients ability with ADLs      min Patient Education:  YES  Reviewed HEP   []  Progressed/Changed HEP based on: Other Objective/Functional Measures:    Patient needed Mod A to remove jacket. High tone LUE affects ROM. Post Treatment Pain Level (on 0 to 10) scale:   0  / 10     ASSESSMENT  Assessment/Changes in Function:     Limited progress LUE 2/2 high tone. []  See Progress Note/Recertification   Patient will continue to benefit from skilled OT services to address ROM deficits to attain remaining goals. Progress toward goals / Updated goals:     Working toward goals     PLAN  [x]  Upgrade activities as tolerated YES Continue plan of care   []  Discharge due to :    []  Other:      Therapist: [place], OTR/L    Date: 2017 Time: 12:32 PM

## 2017-06-08 NOTE — PROGRESS NOTES
PHYSICAL THERAPY - DAILY TREATMENT NOTE      Patient Name: [facility]. Date: 2017  : 1975   YES Patient  Verified  Visit #:   10   of   12  Insurance: Payor: [address] [place] [address] / Plan: 1500    / Product Type: Medicaid /       In time: 10:30 Out time: 11:05   Total Treatment Time: 35 min       TREATMENT AREA =  Gait instability [R26.81]  Stroke (cerebrum) (HCC) [I63.9]    SUBJECTIVE    Pain Level (on 0 to 10 scale):  4  / 10   Medication Changes/New allergies or changes in medical history, any new surgeries or procedures? NO    If yes, update Summary List   Subjective Functional Status/Changes:  []  No changes reported     \"My legs are really tight today. \"        OBJECTIVE    10 min Therapeutic Exercise:  [x]  See flow sheet   Rationale:      increase ROM, increase strength, improve coordination, improve balance and increase proprioception to improve the patients ability to increase pt's stability/mobility and improve functional activity and ability to perform ADL's    20 min Therapeutic Activity: HK, retro, lateral gait CGA with shea-walker, sit to stands from W/C (S)   Rationale:   improve coordination, improve balance and increase proprioception to improve the patients ability to perform ADLs, transfers and gait safely and independently. 5 min Manual Therapy: Manual L hip flexor in sidelying and HS stretch in supine   Rationale:      decrease pain, increase ROM and increase tissue extensibility to improve patient's ability to perform functional activities and decrease pain.     1 min Patient Education:  YES  Reviewed HEP   []  Progressed/Changed HEP based on:   Educated pt on performing glut sets, QS and seated marches with TA draw     Other Objective/Functional Measures:    C/o inc pain/tightness L hip flexor and L HS muscles today; minimal relief with manual stretching  Mod(A) for bilat LE negotiation moving sit to supine  Min(A) supine to sit for trunk negotiation    Improved distal quad activation with increased repetitions of supine QS. Pt ambulated HK, retro, lateral ambulation min(A); distance per flowsheet     Post Treatment Pain Level (on 0 to 10) scale:   4  / 10     ASSESSMENT    Assessment/Changes in Function:     (S) for sit to stands from W/C and improved postural control/core activation with seated alternating marches. []  See Progress Note/Recertification   Patient will continue to benefit from skilled PT services to modify and progress therapeutic interventions, address functional mobility deficits, address ROM deficits, address strength deficits, analyze and address soft tissue restrictions, analyze and cue movement patterns, analyze and modify body mechanics/ergonomics, assess and modify postural abnormalities, address imbalance/dizziness and instruct in home and community integration to attain remaining goals. Progress toward goals / Updated goals:    New Goals to be achieved in __4__ weeks:  1.  1. Patient will ambulate 250 feet with LRAD and no safety concerns to improve safety with community ambulation. 2.  2. Increase Tinetti score to 14/28 to decrease fall risk.    3.  3. Patient will be independent with home exercise program.   4.  4. Patient will increase FOTO Functional Status score to 47 to indicate decreased functional limitations.         PLAN    [x]  Upgrade activities as tolerated YES Continue plan of care   []  Discharge due to :    []  Other:      Therapist: Guille Redd DPT    Date: 6/8/2017 Time: 7:46 AM     Future Appointments  Date Time Provider Nanda Pappas   6/8/2017 10:00 AM Arturo Ford OTR/ASHLEIGH [facility]   6/8/2017 10:30 AM Nora Matta [facility]   6/13/2017 1:00 PM Elayne Montoya PT [facility]   6/13/2017 1:30 PM Arturo Ford OTR/ASHLEIGH [facility]   6/15/2017 10:00 AM Arturo Ford OTR/ASHLEIGH [facility]   6/15/2017 10:30 AM Elayne Montoya PT [facility]   6/20/2017 10:00 AM Esau Altamirano OTR/L [facility]   [facility]   6/22/2017 11:00 AM Regis [facility]   6/22/2017 11:30 AM Esau Altamirano [facility]   6/27/2017 11:00 AM Radha Chapa PT [facility]   6/27/2017 11:30 AM Esau Altamirano OTR/L [facility]   6/29/2017 11:00 AM Radha Chapa PT [facility]   6/29/2017 11:30 AM Esau Altamirano OTR/[facility]

## 2017-06-13 ENCOUNTER — HOSPITAL ENCOUNTER (OUTPATIENT)
Dept: PHYSICAL THERAPY | Age: 42
Discharge: HOME | End: 2017-06-13
Payer: COMMERCIAL

## 2017-06-13 PROCEDURE — 97530 THERAPEUTIC ACTIVITIES: CPT

## 2017-06-13 PROCEDURE — 97110 THERAPEUTIC EXERCISES: CPT

## 2017-06-13 NOTE — PROGRESS NOTES
PHYSICAL THERAPY - DAILY TREATMENT NOTE    Patient Name: Gina Mojica. Date: 2017  : 1975   YES Patient  Verified  Visit #:     Insurance: Payor: [place] / Plan: [employer] / Product Type: Medicaid /      In time: 1:00 Out time: 1:30   Total Treatment Time: 30     Medicare Time Tracking (below)   Total Timed Codes (min):  30 1:1 Treatment Time:  30     TREATMENT AREA =  Gait instability [R26.81]  Stroke (cerebrum) (HCC) [I63.9]  SUBJECTIVE    Pain Level (on 0 to 10 scale):  0  / 10   Medication Changes/New allergies or changes in medical history, any new surgeries or procedures? NO    If yes, update Summary List   Subjective Functional Status/Changes:  []  No changes reported     Pt c/o L LE stiffness.           OBJECTIVE    25 min Therapeutic Exercise:  [x]  See flow sheet   Rationale:      increase ROM, increase strength, improve coordination, improve balance and increase proprioception to improve the patients ability to perform ADLs/IADLs, functional mobility and gait safely and independently without increased pain/symptoms     5 min Gait Training: Amb with HW/L AFO 75' x 1, 25' x 1 with S to CG   Rationale: improve gait to improve the patient's ability to perform ADLs/IADLs, functional mobility and gait safely and independently without increased pain/symptoms        During TE min Patient Education:  YES  Reviewed HEP   []  Progressed/Changed HEP based on:   Cont HEP     Other Objective/Functional Measures:    Demonstrates externally rotated position of L LE with increased L hip/knee flexion during stance phase of gait, externally rotated position of L LE during swing phase of gait, decreased step length R LE and use of trunk rotation to advance L LE during gait with HW/L AFO  Requires min A with R LE for supine to sit transition, min A for scooting on mat  Requires assistance for LAQ  Performed manual stretch for L hip flexors, internal/external rotators and hamstrings     Post Treatment Pain Level (on 0 to 10) scale:   0  / 10     ASSESSMENT    Assessment/Changes in Function:     Tolerated exercises without complaints     []  See Progress Note/Recertification   Patient will continue to benefit from skilled PT services to modify and progress therapeutic interventions, address functional mobility deficits, address ROM deficits, address strength deficits, analyze and address soft tissue restrictions, analyze and cue movement patterns, analyze and modify body mechanics/ergonomics, assess and modify postural abnormalities, address imbalance/dizziness and instruct in home and community integration to attain remaining goals. Progress toward goals / Updated goals:    Progressing slowly toward goals:  New Goals to be achieved in __4__ weeks:  1.  1. Patient will ambulate 250 feet with LRAD and no safety concerns to improve safety with community ambulation. - Amb 75' x 1, 25' x 1 with S to CG   2.  2. Increase Tinetti score to 14/28 to decrease fall risk. - Cont balance and functional mobility/gait training   3.  3. Patient will be independent with home exercise program. - Reports compliance with HEP   4.  4. Patient will increase FOTO Functional Status score to 47 to indicate decreased functional limitations.  - Cont strengthening and balance/gait training to improve function            PLAN    [x]  Upgrade activities as tolerated YES Continue plan of care   []  Discharge due to :    []  Other:      Therapist: Luz Isaac PT    Date: 6/13/2017 Time: 1:01 PM     Future Appointments  Date Time Provider Nanda Pappas   6/13/2017 1:30 PM Shanti Kinney OTR/L [facility]   6/15/2017 10:00 AM Shanti Kinney OTR/ASHLEIGH [facility]   6/15/2017 10:30 AM Luz Isaac PT [facility]   6/20/2017 10:00 AM Shanti Kinney OTR/ASHLEIGH [facility]   6/20/2017 10:30 AM Gabo Alvarado [facility]   6/22/2017 11:00 AM [address] [place] [address]   6/22/2017 11:30 AM Esau Altamirano OTR/L [facility]   6/27/2017 11:00 AM Radha Chapa PT [facility]   6/27/2017 11:30 AM Esau Altamirano OTR/L [facility]   6/29/2017 11:00 AM Radha Chapa PT [facility]   6/29/2017 11:30 AM Esau Altamirano OTR/L [facility]

## 2017-06-13 NOTE — PROGRESS NOTES
OCCUPATIONAL THERAPY - DAILY TREATMENT NOTE    Patient Name: Dickson Rea. Date: 2017  : 1975   YES Patient  Verified  Visit #:   5   of   8  Insurance: Payor: [place] / Plan: 1500 / Product Type: Medicaid /      In time: 1:35 Out time: 2:00   Total Treatment Time: 25     TREATMENT AREA =  LUE    SUBJECTIVE    Pain Level (on 0 to 10 scale):  1-2  / 10   Medication Changes/New allergies or changes in medical history, any new surgeries or procedures? NO    If yes, update Summary List   Subjective Functional Status/Changes:  [x]  No changes reported        OBJECTIVE    15 min Therapeutic Exercise:  [x]  See flow sheet   Rationale:      increase ROM to improve the patients ability to use LUE with ADLs     10 min Therapeutic Activity: LB dressing -min A with right sock and Ind with right shoe   Rationale:    increase ROM to improve the patients ability to dress self     min Patient Education:  YES  Reviewed HEP   []  Progressed/Changed HEP based on: Other Objective/Functional Measures:    Working on dressing skills to increase ADL independence      Post Treatment Pain Level (on 0 to 10) scale:     / 10     ASSESSMENT  Assessment/Changes in Function:     Slow progress with LUE function     []  See Progress Note/Recertification   Patient will continue to benefit from skilled OT services to address ROM deficits and ADLs to attain remaining goals. Progress toward goals / Updated goals:     Working toward goals     PLAN  [x]  Upgrade activities as tolerated YES Continue plan of care   []  Discharge due to :    []  Other:      Therapist: ELIOT Dubois/L    Date: 2017 Time: 3:29 PM

## 2017-06-15 ENCOUNTER — HOSPITAL ENCOUNTER (OUTPATIENT)
Dept: PHYSICAL THERAPY | Age: 42
Discharge: HOME | End: 2017-06-15
Payer: COMMERCIAL

## 2017-06-15 PROCEDURE — 97116 GAIT TRAINING THERAPY: CPT

## 2017-06-15 PROCEDURE — 97530 THERAPEUTIC ACTIVITIES: CPT

## 2017-06-15 NOTE — PROGRESS NOTES
OCCUPATIONAL THERAPY - DAILY TREATMENT NOTE    Patient Name: Peter Mack. Date: 6/15/2017  : 1975   YES Patient  Verified  Visit #:   6   of   8  Insurance: Payor: [address] [place] [address] / Plan: 1500 / Product Type: Medicaid /      In time: 10:15 Out time: 10:30   Total Treatment Time: 15     TREATMENT AREA =  LUE    SUBJECTIVE    Pain Level (on 0 to 10 scale):  3  / 10   Medication Changes/New allergies or changes in medical history, any new surgeries or procedures? NO    If yes, update Summary List   Subjective Functional Status/Changes:  []  No changes reported     I'm going to stay at [facility] for the weekend while my parents are out of town. OBJECTIVE     min Therapeutic Exercise:  [x]  See flow sheet     15 min Therapeutic Activity: L dressing    Rationale:    increase ROM to improve the patients ability to dress self     min Patient Education:  YES  Reviewed HEP   []  Progressed/Changed HEP based on: Other Objective/Functional Measures:    Patient arrived late for appt / transportation. Donned practice scrub pants with Mod A using and not using reacher. Reminded patient and his CG of importance of him practicing ADLs at home to gain skill. Post Treatment Pain Level (on 0 to 10) scale:   3  / 10     ASSESSMENT  Assessment/Changes in Function:     Needs to practice ADLs to gain skills. []  See Progress Note/Recertification   Patient will continue to benefit from skilled OT services to address ROM deficits and ADLs to attain remaining goals. Progress toward goals / Updated goals:     Working toward dressing goals     PLAN  [x]  Upgrade activities as tolerated YES Continue plan of care   []  Discharge due to :    []  Other:      Therapist: ELIOT Sampson/ASHLEIGH    Date: 6/15/2017 Time: 12:55 PM

## 2017-06-15 NOTE — PROGRESS NOTES
PHYSICAL THERAPY - DAILY TREATMENT NOTE    Patient Name: Tamar Tavarez. Date: 6/15/2017  : 1975   YES Patient  Verified  Visit #:     Insurance: Payor: [address] [place] [address] / Plan:     / Product Type: Medicaid /      In time: 10:35 Out time: 11:00   Total Treatment Time: 25     Medicare Time Tracking (below)   Total Timed Codes (min):  25 1:1 Treatment Time:  25     TREATMENT AREA =  Gait instability [R26.81]  Stroke (cerebrum) (HCC) [I63.9]  SUBJECTIVE    Pain Level (on 0 to 10 scale):  3  / 10   Medication Changes/New allergies or changes in medical history, any new surgeries or procedures? NO    If yes, update Summary List   Subjective Functional Status/Changes:  []  No changes reported     Pt reports 3/10 L upper thigh pain. Pt requests phone number for Heather Dumont from [facility]. OBJECTIVE    23 min Gait Training: Step-ups, stair negotiation, gait with HW   Rationale: improve strength, improve balance and improve gait to improve the patient's ability to perform ADLs/IADLs, functional mobility and gait safely and independently without increased pain/symptoms      2 min Patient Education:  YES  Reviewed HEP   []  Progressed/Changed HEP based on:   Provided pt with [employer] phone number.      Other Objective/Functional Measures:    Initiated step-ups in parallel bars with 4\" step (stepping up with L LE, down with R LE)  Initiated stair training (ascending with R LE, descending with L LE using step-to gait pattern and 1 HR - required CG to min A for safety)  Amb 75' x 2 with HW with S to CG, verbal cues to avoid excessive L trunk rotation and excessive ER of L LE during ambulation     Post Treatment Pain Level (on 0 to 10) scale:   0  / 10     ASSESSMENT    Assessment/Changes in Function:     Continues to demonstrate L LE ER during ambulation     []  See Progress Note/Recertification   Patient will continue to benefit from skilled PT services to modify and progress therapeutic interventions, address functional mobility deficits, address ROM deficits, address strength deficits, analyze and address soft tissue restrictions, analyze and cue movement patterns, analyze and modify body mechanics/ergonomics, assess and modify postural abnormalities, address imbalance/dizziness and instruct in home and community integration to attain remaining goals. Progress toward goals / Updated goals:    Progressing toward goals:  New Goals to be achieved in __4__ weeks:  1.  1. Patient will ambulate 250 feet with LRAD and no safety concerns to improve safety with community ambulation. - Amb 75' x 2 with S to CG   2.  2. Increase Tinetti score to 14/28 to decrease fall risk. - Cont balance and functional mobility/gait training   3.  3. Patient will be independent with home exercise program. - Reports compliance with HEP   4.  4. Patient will increase FOTO Functional Status score to 47 to indicate decreased functional limitations.  - Cont strengthening and balance/gait training to improve function            PLAN    [x]  Upgrade activities as tolerated YES Continue plan of care   []  Discharge due to :    []  Other:      Therapist: Abdirizak Russell PT    Date: 6/15/2017 Time: 10:34 AM     Future Appointments  Date Time Provider Nanda Pappas   6/22/2017 11:00  [facility]   6/22/2017 11:30 AM Chintan Neves, OTR/L [facility]   6/27/2017 11:00 AM Abdirizak Russell PT [facility]   6/27/2017 11:30 AM Chintan Neves, OTR/L [facility]   6/29/2017 11:00 AM Abdirizak Russell PT [facility]   6/29/2017 11:30 AM Chintan Neves, OTR/L [facility]   7/3/2017 11:00 AM Chintan Neves, OTR/L [facility]   7/3/2017 11:30 AM Abdirizak Russell PT [facility]   7/6/2017 11:00 AM Chintan Neves, OTR/L [facility]   7/6/2017 11:30 AM [facility]   7/13/2017 10:30 AM Abdirizak Russell PT [facility]   7/13/2017 11:00 AM Kayley Roberson Risa Buckley, OTR/L [facility]   7/18/2017 11:00 AM Marcial Castro, OTR/L [facility]   7/18/2017 11:30 AM Ness Reid, PT [facility]   7/20/2017 11:00 AM Marcial Castro, OTR/L [facility]   7/20/2017 11:30 AM Ness Reid, PT [facility]   7/25/2017 11:00 AM Marcial Castro, OTR/L [facility]   7/25/2017 11:30 AM Ness Reid, PT [facility]

## 2017-06-20 ENCOUNTER — APPOINTMENT (OUTPATIENT)
Dept: PHYSICAL THERAPY | Age: 42
End: 2017-06-20
Payer: COMMERCIAL

## 2017-06-22 ENCOUNTER — HOSPITAL ENCOUNTER (OUTPATIENT)
Dept: PHYSICAL THERAPY | Age: 42
Discharge: HOME | End: 2017-06-22
Payer: COMMERCIAL

## 2017-06-22 PROCEDURE — 97110 THERAPEUTIC EXERCISES: CPT

## 2017-06-22 PROCEDURE — 97530 THERAPEUTIC ACTIVITIES: CPT

## 2017-06-22 PROCEDURE — 97112 NEUROMUSCULAR REEDUCATION: CPT

## 2017-06-22 NOTE — PROGRESS NOTES
OCCUPATIONAL THERAPY - DAILY TREATMENT NOTE    Patient Name: Stiven Chavarria. Date: 2017  : 1975   YES Patient  Verified  Visit #:   7   of   8  Insurance: Payor: [address] [place] [address] / Plan:     / Product Type: Medicaid /      In time: 11:35 Out time: 12:05   Total Treatment Time: 30     TREATMENT AREA =  LUE    SUBJECTIVE    Pain Level (on 0 to 10 scale):  1  / 10   Medication Changes/New allergies or changes in medical history, any new surgeries or procedures? NO    If yes, update Summary List   Subjective Functional Status/Changes:  []  No changes reported     I'm getting a shower when I get home. I'll work on the dressing then. OBJECTIVE    15 min Therapeutic Exercise:  [x]  See flow sheet   Rationale:      increase ROM to improve the patients ability to use LUE as an assist with ADLs     15 min Therapeutic Activity: LB dressing; review bathing skills   Rationale:    increase ROM to improve the patients ability to dress self     min Patient Education:  YES  Reviewed HEP   []  Progressed/Changed HEP based on: Other Objective/Functional Measures:    Tori Mondragon, present to discuss ADLs. Verbally reviewed steps for patient to wash himself and practiced donning pants with reacher. Discussed need for patient to practice dressing himself to gain independence. Post Treatment Pain Level (on 0 to 10) scale:   1  / 10     ASSESSMENT  Assessment/Changes in Function:     Patient should be able to wash and dress himself with practice. []  See Progress Note/Recertification   Patient will continue to benefit from skilled OT services to address ROM deficits and ADLs to attain remaining goals. Progress toward goals / Updated goals:     Working toward ADL goals     PLAN  [x]  Upgrade activities as tolerated YES Continue plan of care   []  Discharge due to :    []  Other:      Therapist: ELIOT Mckeon/L    Date: 2017 Time: 12:43 PM

## 2017-06-22 NOTE — PROGRESS NOTES
PHYSICAL THERAPY - DAILY TREATMENT NOTE    Patient Name: Kay Craig. Date: 2017  : 1975   YES Patient  Verified  Visit #:   15   of   15-19  Insurance: Payor: [address] [place] [address] / Plan:     / Product Type: Medicaid /      In time: 11:05 Out time: 11:30   Total Treatment Time: 25     Medicare Time Tracking (below)   Total Timed Codes (min):  na 1:1 Treatment Time:  na     TREATMENT AREA =  Gait instability [R26.81]  Stroke (cerebrum) (HCC) [I63.9]    SUBJECTIVE    Pain Level (on 0 to 10 scale):  2  / 10   Medication Changes/New allergies or changes in medical history, any new surgeries or procedures? NO    If yes, update Summary List   Subjective Functional Status/Changes:  []  No changes reported     \"I don't have my cane today. \"     Pt reports that he is getting around good at home walking with his shea-walker, he has a quad cane, but can't walk with that. Pt reports that he wears his AFO and tennis shoes when he gets in the pool. OBJECTIVE    17 min Therapeutic Activity: gait with HW negotiating around obstacles with S   sit<>stand   stair negotiation with unilateral HR and CGA   Rationale:   increase ROM, increase strength, improve coordination, improve balance and increase proprioception to promote increased functional mobility and increased activity tolerance with ADL's.    8 min Neuromuscular Re-ed: Cawthorne Cooksey ex in stance   Rationale:    improve coordination, improve balance and increase proprioception to improve the patients ability to perform ADLs, transfers and gait safely and independently. min Patient Education:  YES  Reviewed HEP   [x]  Progressed/Changed HEP based on: Added CC 1 & 2 to HEP     Other Objective/Functional Measures:    Pt reports that his HW is much heavier than the one we have.  Pt caught his L toes (secondary to hip ER) on obstacles, but able to I maintain balance and reposition HW and L LE to negotiate obstacle. [address] for safety awareness. Worked on [employer] with sit<>stand and in stance. Pt able to demo increased [address] with [address], but with poor carry over with therapeutic activity. Added CC ex 1 & 2. Pt unsteady requiring 2 finger support for balance. Pt unsteady, but without LOB with stance without UE support. Pt required SBA to CGA for balance with stair negotiation and min VCing for stepping pattern. Post Treatment Pain Level (on 0 to 10) scale:   2  / 10     ASSESSMENT    Assessment/Changes in Function:     Pt continues to demo decreased [address] with transfers and standing. []  See Progress Note/Recertification   Patient will continue to benefit from skilled PT services to modify and progress therapeutic interventions, address functional mobility deficits, address ROM deficits, address strength deficits, analyze and address soft tissue restrictions, analyze and cue movement patterns, analyze and modify body mechanics/ergonomics, assess and modify postural abnormalities, address imbalance/dizziness and instruct in home and community integration to attain remaining goals. Progress toward goals / Updated goals:    New Goals to be achieved in __4__ weeks:  1.  1. Patient will ambulate 250 feet with LRAD and no safety concerns to improve safety with community ambulation. - Amb 75' x 2 with S to CG   2.  2. Increase Tinetti score to 14/28 to decrease fall risk. - Cont balance and functional mobility/gait training   3.  3. Patient will be independent with home exercise program. - Reports compliance with HEP   4.  4. Patient will increase FOTO Functional Status score to 47 to indicate decreased functional limitations.  - Cont strengthening and balance/gait training to improve function          PLAN    []  Upgrade activities as tolerated YES Continue plan of care   []  Discharge due to :    []  Other:      Therapist: Yara Shaw PTA    Date: 6/22/2017 Time: 1:27 PM Future Appointments  Date Time Provider Nanda Pappas   6/27/2017 11:00 AM Sheridan Lazaro, PT [facility]   6/27/2017 11:30 AM Delmarie Cargo, OTR/L [facility]   6/29/2017 11:00 AM Sheridan Lazaro, PT [facility]   6/29/2017 11:30 AM Alvie Cargo, OTR/L [facility]   7/3/2017 11:00 AM Alvie Cargo, OTR/L [facility]   7/3/2017 11:30 AM Sheridan Lazaro, PT [facility]   7/6/2017 11:00 AM Alvie Cargo, OTR/L [facility]   7/6/2017 11:30 AM Elsa Uriostegui [facility]   7/13/2017 10:30 AM Sheridan Lazaro, PT [facility]   7/13/2017 11:00 AM Alvie Cargo, OTR/L [facility]   7/18/2017 11:00 AM Alvie Cargo, OTR/L [facility]   7/18/2017 11:30 AM Sheridan Lazaro, PT [facility]   7/20/2017 11:00 AM Alvie Cargo, OTR/L [facility]   7/20/2017 11:30 AM Sheridan Lazaro, PT [facility]   7/25/2017 11:00 AM Alvie Cargo, OTR/L [facility]   7/25/2017 11:30 AM Sheridan Lazaro, PT [facility]

## 2017-06-27 ENCOUNTER — HOSPITAL ENCOUNTER (OUTPATIENT)
Dept: PHYSICAL THERAPY | Age: 42
Discharge: HOME | End: 2017-06-27
Payer: COMMERCIAL

## 2017-06-27 PROCEDURE — 97110 THERAPEUTIC EXERCISES: CPT

## 2017-06-27 PROCEDURE — 97530 THERAPEUTIC ACTIVITIES: CPT

## 2017-06-27 NOTE — PROGRESS NOTES
OCCUPATIONAL THERAPY - DAILY TREATMENT NOTE    Patient Name: Jeanne Harkins. Date: 2017  : 1975   YES Patient  Verified  Visit #:   8   of   8-10  Insurance: Payor: [place] / Plan: 1500 / Product Type: Medicaid /      In time: 11:30 Out time: 12:00   Total Treatment Time: 30     TREATMENT AREA =  LUE    SUBJECTIVE    Pain Level (on 0 to 10 scale):  2  / 10   Medication Changes/New allergies or changes in medical history, any new surgeries or procedures? NO    If yes, update Summary List   Subjective Functional Status/Changes:  []  No changes reported     I worked my arm good in the pool yesterday. OBJECTIVE    30 min Therapeutic Exercise:  [x]  See flow sheet   Rationale:      increase ROM and increase strength to improve the patients ability to use LUE as an assist with ADLs     min Patient Education:  YES  Reviewed HEP   []  Progressed/Changed HEP based on: Other Objective/Functional Measures:    Talked with patient and his father concerning re-eval with plans for DC from OT this week. He is able to actively assist move the L SH and elbow but unable to initiate motion without ball support. Also discussed with patient and his dad and CG of the importance of working on ADLs. Post Treatment Pain Level (on 0 to 10) scale:   0  / 10     ASSESSMENT  Assessment/Changes in Function:     Slow progress with LUE.     []  See Progress Note/Recertification   Patient will continue to benefit from skilled OT services to address ROM deficits to attain remaining goals.    Progress toward goals / Updated goals:    Re-eval      PLAN  [x]  Upgrade activities as tolerated YES Continue plan of care   []  Discharge due to :    []  Other:      Therapist: ELIOT Spivey/L    Date: 2017 Time: 12:43 PM

## 2017-06-27 NOTE — PROGRESS NOTES
PHYSICAL THERAPY - DAILY TREATMENT NOTE    Patient Name: Aixa Santiago. Date: 2017  : 1975   YES Patient  Verified  Visit #:     Insurance: Payor: [address] [place] [address] / Plan:     / Product Type: Medicaid /      In time: 11:05 Out time: 11:35   Total Treatment Time: 30     Medicare Time Tracking (below)   Total Timed Codes (min):  na 1:1 Treatment Time:  na     TREATMENT AREA =  CVA    SUBJECTIVE    Pain Level (on 0 to 10 scale):  4  / 10 L knee   Medication Changes/New allergies or changes in medical history, any new surgeries or procedures? NO    If yes, update Summary List   Subjective Functional Status/Changes:  []  No changes reported     Pt reports that his L knee is sore from exercising in the water yesterday, states that he was able to walk up and down the steps (of the pool) without help, but his mom and dad were close in case. Pt states that he is walking with his HW at home, but someone is always with him. Pt states \"they won't let me\". (walk without someone with him)    Pt requests phone number for [employer] again stating his phone was broken when they called him so he plans to call them again. (provided pt with contact ph#). Pt states that his biggest issue is balance when he is trying to get dressed, states he can't pull his pants up and balance. OBJECTIVE    25 min Therapeutic Activity: Tinetti, stairs, sit<>stand   Rationale:  increase ROM, increase strength, improve coordination, improve balance and increase proprioception to promote increased functional mobility and increased activity tolerance with ADL's.      5 min Neuromuscular Re-ed: CC ex for vestibular stimulation (1,2, 4 & 6)   Rationale:   improve coordination, improve balance and increase proprioception to improve the patients ability to perform ADLs, transfers and gait safely and independently.      min Patient Education:  YES  Reviewed HEP   []  Progressed/Changed HEP based on:   Pt reports he didn't try the CC ex at home yet (1 & 2) will try this week     Other Objective/Functional Measures: Added CC 4 & 6 in stance, pt required 1 UE A for balance. Tinetti: 13/28    Pt negotiated up and down 3 steps with unilateral HR and SBA. S for amb around clinic with HW. Post Treatment Pain Level (on 0 to 10) scale:   4  / 10     ASSESSMENT    Assessment/Changes in Function:     Pt's Tinetti balance score improved 1 point since last assessment indicating minimal change in functional mobility. Pt reports increased I with stair negotiation in and out of his pool. []  See Progress Note/Recertification   Patient will continue to benefit from skilled PT services to  modify and progress therapeutic interventions, address functional mobility deficits, address ROM deficits, address strength deficits, analyze and address soft tissue restrictions, analyze and cue movement patterns, analyze and modify body mechanics/ergonomics, assess and modify postural abnormalities, address imbalance/dizziness and instruct in home and community integration to attain remaining goals. Progress toward goals / Updated goals:    New Goals to be achieved in __4__ weeks:  1.  1. Patient will ambulate 250 feet with LRAD and no safety concerns to improve safety with community ambulation. - Amb 75' with S    2.  2. Increase Tinetti score to 14/28 to decrease fall risk. - Progressing=13/28   3.  3. Patient will be independent with home exercise program. - Reports compliance with HEP   4.  4. Patient will increase FOTO Functional Status score to 47 to indicate decreased functional limitations.  - Cont strengthening and balance/gait training to improve function          PLAN    []  Upgrade activities as tolerated YES Continue plan of care   []  Discharge due to :    []  Other:      Therapist: So Greenberg PTA    Date: 6/27/2017 Time: 3:55 PM     Future Appointments  Date Time Provider Nanda Elyse   6/29/2017 11:00 AM Yuliet Liz, PT [facility]   6/29/2017 11:30 AM Karen Mathew, OTR/L [facility]   7/3/2017 11:00 AM Karen Mathew, OTR/L [facility]   7/3/2017 11:30 AM Yuliet Liz, PT [facility]   7/6/2017 11:00 AM Karen Mathew, OTR/L [facility]   7/6/2017 [facility]30 [facility]   7/13/2017 10:30 AM Yuliet Liz, PT [facility]   7/13/2017 11:00 AM Karen Mathew, OTR/L [facility]   7/18/2017 11:00 AM Karen Mathew, OTR/L [facility]   7/18/2017 11:30 AM Yuliet Liz, PT [facility]   7/20/2017 11:00 AM Karen Mathew, OTR/L [facility]   7/20/2017 11:30 AM Yuliet Liz, PT [facility]   7/25/2017 11:00 AM Karen Mathew, OTR/L [facility]   7/25/2017 11:30 AM Yuliet Liz, PT [facility]

## 2017-06-29 ENCOUNTER — HOSPITAL ENCOUNTER (OUTPATIENT)
Dept: PHYSICAL THERAPY | Age: 42
Discharge: HOME | End: 2017-06-29
Payer: COMMERCIAL

## 2017-06-29 PROCEDURE — 97110 THERAPEUTIC EXERCISES: CPT

## 2017-06-29 PROCEDURE — 97530 THERAPEUTIC ACTIVITIES: CPT

## 2017-06-29 NOTE — PROGRESS NOTES
OCCUPATIONAL THERAPY - DAILY TREATMENT NOTE    Patient Name: Kings Sosa Date: 2017  : 1975   YES Patient  Verified  Visit #:   9   of   8-10  Insurance: Payor: [address] [place] [address] / Plan: 1500  / Product Type: Medicaid /      In time: 11:35 Out time: 12:10   Total Treatment Time: 35     TREATMENT AREA =  LUE    SUBJECTIVE    Pain Level (on 0 to 10 scale):  0  / 10   Medication Changes/New allergies or changes in medical history, any new surgeries or procedures? NO    If yes, update Summary List   Subjective Functional Status/Changes:  []  No changes reported     I got my shorts on the other day. OBJECTIVE    35 min Therapeutic Exercise:  [x]  See flow sheet   Rationale:      increase ROM to improve the patients ability to dress self      min Patient Education:  Mitchell Holiday   []  Progressed/Changed HEP based on: Other Objective/Functional Measures:    LUE remains non-functional with trace scapular AROM. Patient practiced donning overhead shirt with his father present to demo steps for independence. Discussed pool exercises for LUE. Practiced using sock aid. Encouraged patient and his father and CG, Anderson Glynn, to have patient practice dressing himself to gain skills. Post Treatment Pain Level (on 0 to 10) scale:   0  / 10     ASSESSMENT  Assessment/Changes in Function:     Partial goals met. DC to HEP. [x]  See Progress Note/Recertification   Patient will continue to benefit from skilled OT services to na   Progress toward goals / Updated goals:    DC to HEP     PLAN  []  Upgrade activities as tolerated NO Continue plan of care   [x]  Discharge due to : Plateau in function.  DC to HEP   []  Other:      Therapist: ELIOT Reece/ASHLEIGH    Date: 2017 Time: 12:45 PM

## 2017-06-29 NOTE — PROGRESS NOTES
[address] PHYSICAL THERAPY  [address] [place] [address] Kyra Jacobs, [address] [employer] - Phone: [phone]  Fax: [phone] SUMMARY  Patient Name: Jeanne Harkins. : 1975   Treatment/Medical Diagnosis: Gait instability [R26.81]  Stroke (cerebrum) [facility]) [I63.9]   Referral Source: Ella Grigsby DO     Date of Initial Visit: 2017 Attended Visits: 15 Missed Visits: 1     SUMMARY OF TREATMENT  Treatment has consisted of initial evaluation and 14 treatment sessions, which have included LE ROM/stretching/strengthening exercises, balance training, functional mobility/gait training and patient/family/caregiver education (including HEP). CURRENT STATUS  Patient has progressed with exercises in clinic, and demonstrates compliance/independence with HEP with assistance from family and caregiver. Additionally, patient has been walking in pool with assistance from family. Patient's family reports that patient has been walking with HW and L AFO in home, and patient has ambulated up to 125' with clinic with HW and L AFO with SBA. Patient able to ascend/descend stairs with L AFO and 1 HR using step-to gait pattern with SBA. Patient has performed a trial with [employer] e-stim AFO and demonstrated good foot clearance with use of device during ambulation. Patient continues to use w/c for mobility in community. Tinetti = 13/28 (increased slightly from 12/28), indicating patient remains at high risk of falling. FOTO = 23/100 (increased slight from 21/100). Patient has reached a plateau in progress at this time. Goal/Measure of Progress Goal Met? 1. Patient will ambulate 250 feet with LRAD and no safety concerns to improve safety with community ambulation. Status at last Eval: Amb 100' with HW, L AFO and S Current Status: Amb 125' with HW, L AFO and S Progressing slowly toward goal   2. Increase Tinetti score to 14/28 to decrease fall risk.    Status at last Eval: Tinetti = 12/28 Current Status: Tinetti = 13/24 Progressing slowly toward goal   3. Patient will be independent with home exercise program.   Status at last Eval: Compliant with HEP Current Status: Independent with HEP yes   4. Patient will increase FOTO Functional Status score to 47 to indicate decreased functional limitations. Status at last Eval: FOTO = 21/100 Current Status: FOTO = 22/80 Progressing slowly toward goal     RECOMMENDATIONS  Other: Discontinue therapy. Patient has reached a plateau in progress. Will discharge with HEP. If you have any questions/comments please contact us directly at [phone]. Thank you for allowing us to assist in the care of your patient. Therapist Signature: Nikos Warner PT Date: 6/29/2017     Time: 3:38 PM     NOTE TO PHYSICIAN:  PLEASE COMPLETE THE ORDERS BELOW AND FAX TO   [facility]: [phone]. If you are unable to process this request in 24 hours please contact our office: [phone]. To ensure we are able to process the patients encounter and avoid risk of your patient receiving a bill for our services, please sign and return this discharge summary by 7/28/2017. (30days following DC date)    ___ I have read the above report and request that my patient be discharged from therapy.      Physician Signature:        Date:       Time:

## 2017-06-29 NOTE — PROGRESS NOTES
[facility] PHYSICAL THERAPY  [address] [place] [address] Mariah Jacobs, [address] - Phone: [phone]  Fax: [phone]  [address] [place] [address]          Patient Name: Carmelina Walsh. : 1975   Medical/Treatment Diagnosis: Generalized muscle weakness [M62.81]  Stroke (cerebrum) [facility]) [I63.9]   Onset Date: 2016    Referral Source: Susan Byrnes DO Start of Care [facility]): 17   Prior Hospitalization: See Medical History Provider #: [id number]   Prior Level of Function: Ind   Comorbidities: HTN   Medications: Verified on Patient Summary List   Visits from [facility]: 9 Missed Visits: 0       Goal/Measure of Progress Goal Met? 1. Ind HEP   Status at last Eval: Provided  Current Status: Ind yes   2. Set up for UB dressing    Status at last Eval: Mod A Current Status: Set up yes   3. Min A with pants   Status at last Eval: Max A  Current Status: Min A to set up for shorts yes   4. Don socks with set up    Status at last Eval: Max A  Current Status: Min A with R   Mod A with L progressing     Key Functional Changes/Progress: LUE AROM is essentially unchanged with only trace scapular mobility. He needs practice and encouragement with ADLs. G-Codes (GO): na  Assessments/Recommendations: Discontinue therapy. Progressing towards or have reached established goals. If you have any questions/comments please contact us directly at [phone]. Thank you for allowing us to assist in the care of your patient. To ensure we are able to process the patients encounter and avoid risk of your patient receiving a bill for our services, please sign and return this discharge summary by 17. Therapist Signature: ELIOT Milian/L Date: 17   Reporting Period: 17-17 Time: 12:46 PM       NOTE TO PHYSICIAN:  PLEASE COMPLETE THE ORDERS BELOW AND FAX TO   [facility]: [phone].   If you are unable to process this request in 24 hours please contact our office: [phone].    ___ I have read the above report and request that my patient be discharged from therapy.      Physician Signature:        Date:       Time:

## 2017-06-29 NOTE — PROGRESS NOTES
PHYSICAL THERAPY - DAILY TREATMENT NOTE    Patient Name: Patrick Riley. Date: 2017  : 1975   YES Patient  Verified  Visit #:   15   of     Insurance: Payor: [address] [place] [address] / Plan: [address] [place] [address]   / Product Type: Medicaid /      In time: 11:00 Out time: 11:30   Total Treatment Time: 30     Medicare Time Tracking (below)   Total Timed Codes (min):  30 1:1 Treatment Time:  30     TREATMENT AREA =  Gait instability [R26.81]  Stroke (cerebrum) ([facility]) [I63.9]  SUBJECTIVE    Pain Level (on 0 to 10 scale):  2  / 10   Medication Changes/New allergies or changes in medical history, any new surgeries or procedures? NO    If yes, update Summary List   Subjective Functional Status/Changes:  []  No changes reported     Pt reports 2/10 L knee pain. Pt and pt's father report compliance/independence with HEP. Pt's father reports that pt is ambulating increased distances in home and ambulating in pool. Pt's father reports that pt has difficulty bringing L LE up when ascending stairs to exit pool.           OBJECTIVE    5 min Therapeutic Exercise:  [x]  See flow sheet (Nustep)   Rationale:      increase ROM, increase strength, improve coordination, improve balance and increase proprioception to improve the patients ability to perform ADLs/IADLs, functional mobility and gait safely and independently without increased pain/symptoms     25 min Therapeutic Activity: FOTO, amb with HW and L AFO, stair negotiation   Rationale: assess ADL/IADL function, functional mobility and gait to improve the patient's ability to perform ADLs/IADLs, functional mobility and gait safely and independently without increased pain/symptoms       During TE min Patient Education:  YES  Reviewed HEP   []  Progressed/Changed HEP based on:   Cont HEP     Other Objective/Functional Measures:    FOTO = 23/100    Ambulates 125' with HW and L AFO with SBA; ascends/descends stairs with 1 HR using step-to gait pattern with SBA     Post Treatment Pain Level (on 0 to 10) scale:   2  / 10     ASSESSMENT    Assessment/Changes in Function:     See discharge note     []  See Progress Note/Recertification      Progress toward goals / Updated goals:    See discharge note     PLAN    []  Upgrade activities as tolerated NO Continue plan of care   [x]  Discharge due to : Marybeth Lira in progress; I with HEP with assistance from family and caregiver   []  Other:      Therapist: Kiki Elmore PT    Date: 6/29/2017 Time: 11:51 AM     Future Appointments  Date Time Provider Nanda Pappas   7/3/2017 11:00 AM Dietra Sera, OTR/L [facility]   7/3/2017 11:30 AM Kiki Elmore PT [facility]   7/6/2017 11:00 AM Dietra Sera, OTR/L [facility]   7/6/2017 11:30 AM Rebeca Matta [facility]   7/13/2017 10:30 AM Kiki Elmore PT [facility]   7/13/2017 11:00 AM Dietra Sera, OTR/L [facility]   7/18/2017 11:00 AM Dietra Sera, OTR/L [facility]   7/18/2017 11:30 AM Kiki Elmore PT [facility]   7/20/2017 11:00 AM Dietra Sera, OTR/L [facility]   7/20/2017 11:30 AM Kiki Elmore PT [facility]   7/25/2017 11:00 AM Dietra Sera, OTR/L [facility]   7/25/2017 11:30 AM Kiki Elmore PT [facility]

## 2017-07-03 ENCOUNTER — APPOINTMENT (OUTPATIENT)
Dept: PHYSICAL THERAPY | Age: 42
End: 2017-07-03

## 2017-07-06 ENCOUNTER — APPOINTMENT (OUTPATIENT)
Dept: PHYSICAL THERAPY | Age: 42
End: 2017-07-06

## 2017-07-11 ENCOUNTER — HOSPITAL ENCOUNTER (OUTPATIENT)
Dept: LAB | Age: 42
Discharge: HOME | End: 2017-07-11
Payer: MEDICAID

## 2017-07-11 PROCEDURE — 36415 COLL VENOUS BLD VENIPUNCTURE: CPT | Performed by: INTERNAL MEDICINE

## 2017-07-11 PROCEDURE — 82164 ANGIOTENSIN I ENZYME TEST: CPT | Performed by: INTERNAL MEDICINE

## 2017-07-12 LAB — ACE SERPL-CCNC: 21 U/L (ref 14–82)

## 2017-07-13 ENCOUNTER — APPOINTMENT (OUTPATIENT)
Dept: PHYSICAL THERAPY | Age: 42
End: 2017-07-13

## 2017-07-18 ENCOUNTER — APPOINTMENT (OUTPATIENT)
Dept: PHYSICAL THERAPY | Age: 42
End: 2017-07-18

## 2017-07-20 ENCOUNTER — APPOINTMENT (OUTPATIENT)
Dept: PHYSICAL THERAPY | Age: 42
End: 2017-07-20

## 2017-07-25 ENCOUNTER — APPOINTMENT (OUTPATIENT)
Dept: PHYSICAL THERAPY | Age: 42
End: 2017-07-25

## 2017-12-11 ENCOUNTER — HOSPITAL ENCOUNTER (OUTPATIENT)
Dept: PHYSICAL THERAPY | Age: 42
Discharge: HOME | End: 2017-12-11
Payer: MEDICAID

## 2017-12-11 PROCEDURE — 97162 PT EVAL MOD COMPLEX 30 MIN: CPT

## 2017-12-11 PROCEDURE — 97116 GAIT TRAINING THERAPY: CPT

## 2017-12-11 NOTE — PROGRESS NOTES
[address] PHYSICAL THERAPY  [address] [place] [address], [address] - Phone: [phone]  Fax: [phone] / 0627 [facility]  Patient Name: Yosvany Dumont. : 1975   Medical   Diagnosis: Gait instability [R26.81]  Stroke (cerebrum) ([facility] [address] [I63.9] Treatment Diagnosis: Gait instability [R26.81]  Stroke (cerebrum) ([facility] [address] [I63.9]   Onset Date: 2016     Referral Source: Constantin Betancourt DO Start of Care [facility]): 2017   Prior Hospitalization: See medical history Provider #: [id number]   Prior Level of Function: Independent, working prior to CVA; primarily using w/c for mobility, ambulating short distances with AFO and HW s/p CVA   Comorbidities: Depression, thyroid disorder, HTN, visual impairment (L eye blindness), CVA, neck pain, sleep apnea, h/o brain tumor removal, removal fatty tumor post neck, removal skin cancer L ear, lung nodules (being monitored)   Medications: Verified on Patient Summary List   The Plan of Care and following information is based on the information from the initial evaluation.   ===========================================================================================  Assessment / key information:  Patient is a 38 y.o. male who presents with complaints of difficulty with transfers/ambulation with recently received L KAFO. Patient dependent for donning/doffing KAFO, and current shoe appears to be too small to adequately accomodate KAFO (Patient states that he is going to purchase new shoes today). Patient demonstrates minimal active movement with increased extensor tone L LE, decreased balance and impaired functional mobility/gait. Patient transfers supine to sit with min A at mat table, sit to stand with S to CG from motorized w/c and mat table using AFO or KAFO and HW (requires assistance to lock/unlock KAFO).   Patient able to ambulate up to 120' with L KAFO and HW with S to CG, demonstrates external rotation/circumduction L LE, decreased step length and decreased renato. Patient would benefit from skilled PT services to address these issues and improve function. Thank you for this referral.  ==========================================================================================  Eval Complexity: History: HIGH Complexity :3+ comorbidities / personal factors will impact the outcome/ POC Exam:HIGH Complexity : 4+ Standardized tests and measures addressing body structure, function, activity limitation and / or participation in recreation  Presentation: MEDIUM Complexity : Evolving with changing characteristics  Clinical Decision Making:MEDIUM Complexity : FOTO score of 26-74Overall Complexity:MEDIUM    Problem List: decrease ROM, decrease strength, edema affecting function, impaired gait/ balance, decrease ADL/ functional abilitiies, decrease activity tolerance, decrease flexibility/ joint mobility and decrease transfer abilities   Treatment Plan may include any combination of the following: Therapeutic exercise, Therapeutic activities, Neuromuscular re-education, Physical agent/modality, Gait/balance training, Manual therapy, Patient education, Functional mobility training, Home safety training and Stair training  Patient / Family readiness to learn indicated by: asking questions, trying to perform skills and interest  Persons(s) to be included in education: patient (P) and family support person (FSP);list caregiver  Barriers to Learning/Limitations: yes;  cognitive and sensory deficits-vision/hearing/speech  Measures taken:    Patient Goal (s): \"Walking with brace. \"   Patient self reported health status: good  Rehabilitation Potential: good   Short Term Goals: To be accomplished in  2-3  weeks:  1. Patient will demonstrate compliance with HEP. 2. Patient will demonstrate ability to transfer sit to stand/stand to sit with L KAFO and HW mod I in preparation for ambulation.   3. Patient will complete Tinetti for further assessment of balance/functional mobility/gait.  Long Term Goals: To be accomplished in  4-6  weeks:  1. Patient will demonstrate independence with HEP. 2. Patient will demonstrate ability to ambulate 150' with L KAFO and HW with S in preparation for household ambulation. 3. Patient will demonstrate 4 point improvement on Tinetti to indicate improved balance/functional mobility/gait. Frequency / Duration:   Patient to be seen  2  times per week for 4-6  weeks:  Patient / Caregiver education and instruction: brace/ splint application    Therapist Signature: Seble Ravi PT Date: 18/84/6540   Certification Period: NA Time: 12:00 PM   ===========================================================================================  I certify that the above Physical Therapy Services are being furnished while the patient is under my care. I agree with the treatment plan and certify that this therapy is necessary. Physician Signature:        Date:       Time:     Please sign and return to [employer] or you may fax the signed copy to [phone]. Thank you.

## 2017-12-11 NOTE — PROGRESS NOTES
PHYSICAL THERAPY - DAILY TREATMENT NOTE    Patient Name: Yasmeen Jiang. Date: 2017  : 1975   YES Patient  Verified  Visit #:   1     Insurance: Payor: MEDICAID [facility] / Plan: [id number] / Product Type: Medicaid /      In time: 10:05 Out time: 11:00   Total Treatment Time: 55     Medicare Time Tracking (below)   Total Timed Codes (min):  NA 1:1 Treatment Time:  NA     TREATMENT AREA =  CVA    SUBJECTIVE    Pain Level (on 0 to 10 scale):  0  / 10   Medication Changes/New allergies or changes in medical history, any new surgeries or procedures? NO    If yes, update Summary List   Subjective Functional Status/Changes:  []  No changes reported     Pt denies pain, denies dizziness, denies numbness/tingling. Pt reports that he has a fall this morning while getting out of bed without injury. Pt reports that he has recently gotten KAFO from [employer], and has worn it at office and at home. Pt reports that it does not fit in his shoe well, and he is planning to get new shoes today.             OBJECTIVE    Physical Therapy Evaluation - Neurologic    Posture: [] Poor    [] Fair    [] Good    Describe:       Gait: [] Normal    [x] Abnormal    Device: KAFO, HW     Describe: External rotation L LE, circumduction L LE, decreased step length, decreased renato; requires S to CG    ROM:                             AROM    PROM   Shoulder Left Right Left Right   Flex NT WFL     Ext NT WFL     ABD NT [facility]     ER NT WFL     IR NT WFL     (No active movement observed L UE)           AROM    PROM   Knee Left Right Left Right   Ext None [facility] WFL   Flex None [facility] WFL             AROM                           PROM  Hip Left Right Left Right   Flex <90 AA S/L [facility] WFL   Ext Trace AA S/L [facility] WFL WFL   ABD [facility]   ER None [facility] WFL WFL   IR None WFL WFL WFL                                            AROM      PROM   Ankle Left Right Left Right   Ext None [facility] Flex None WFL < neutral WFL     Strength (MMT):  Shoulder L (1-5) R (1-5)   Shoulder Flexion 0 >3   Shoulder Ext 0 >3   Shoulder ABD 0 >3   Shoulder ADD 0 >3   Shoulder IR 0 >3   Shoulder ER 0 >3                                             Hip L (1-5) R (1-5)   Hip Flexion 2- >3   Hip Ext 1 >3   Hip ABD 0 >3   Hip ADD 0 >3   Hip ER 0 >3   Hip IR 0 >3     Knee L (1-5) R (1-5)   Knee Flexion 0 >3   Knee Extension 0 >3   Ankle PF 0 >3   Ankle DF 0 >3   Other       Tone: Increased extensor tone L LE    Motor Control: Decreased L UE/LE    Sensation: NT    Reflexes: [x] Not Tested   Left Right   Biceps (C5)     Brachioradiais (C6)     Triceps (C7)     Knee Jerk (L4)     Ankle Jerk (SI)       Balance/ Equilibrium:              Left            Right  Tracks Across Midline  no yes   Reaches Across Midline no yes         Sitting Balance: Static:  [x] Good    [] Fair    [] Poor     Dynamic:   [] Good    [x] Fair    [] Poor        Standing Balance: Static:   [] Good    [] Fair    [x] Poor     Dynamic:   [] Good    [] Fair    [x] Poor        Protective Extension:  [x] Present    [] Delayed    [] Absent        Single Leg Stance:         Eyes Open  Eyes Closed   L NT L NT   R NT R NT       Functional Mobility      Bed Mobility:      Scooting: S       Rolling: S       Sit-Supine: min A      Transfers:       Sit-Stand: S       Floor-Stand: NT       Gait:       Tandem: NT       Backwards: NT       Braiding: NT      Elevations:       Curbs: NT       Ramps: NT       Stairs: NT    Behavior: [x] Cooperative    [] Impulsive    [] Agitated    [] Perseverative    [] Confused   Oriented x: 3    Cognition: [] One Step Commands   [x] Multiple Commands   [] Displays Neglect [] R  [] L    Other:       Impaired Judgement: [] Y    [x] N      Impaired Vision:  [x] Y    [] N      Safety Awareness Deficits  [x] Y    [] N      Impaired Hearing  [] Y    [x] N      Able to Express Needs [x] Y    [] N    Optional Tests:       Dynamic Gait Index (24pt scale): Functional Gait Assessment (30pt scale):       Delarosa Balance Scale (56pt scale):     Other test /comments:    25 min Gait Training: Amb 60' x 1, 120' x 1, 60' x 1 with L KAFO and HW with S to CG   Rationale: improve balance and functional mobility/gait to improve the patient's ability to perform ADLs/IADLs, functional mobility and gait safely and independently without increased pain/symptoms       During GT min Patient Education:  NO  Reviewed HEP   []  Progressed/Changed HEP based on:   Educated pt and caregiver on donning/doffing KAFO, wear schedule with gradual increase in wearing time and locking/unlocking hinge at knee     Other Objective/Functional Measures:    See eval     Post Treatment Pain Level (on 0 to 10) scale:   0  / 10     ASSESSMENT    Assessment/Changes in Function:     Justification for Eval Code Complexity:  Patient History : HIGH - depression, thyroid disorder, HTN, visual impairment (L eye blindness), CVA, neck pain, sleep apnea, h/o brain tumor removal  Examination HIGH - See objective  Clinical Presentation: MEDIUM  Clinical Decision Making : MEDIUM - FOTO 40/100    See plan of care     []  See Progress Note/Recertification   Patient will continue to benefit from skilled PT services: see plan of care   Progress toward goals / Updated goals:    See plan of care     PLAN    [x]  Upgrade activities as tolerated YES Continue plan of care   []  Discharge due to :    []  Other:      Therapist: Luz Pak PT    Date: 12/11/2017 Time: 10:19 AM

## 2018-01-03 ENCOUNTER — HOSPITAL ENCOUNTER (OUTPATIENT)
Dept: PHYSICAL THERAPY | Age: 43
Discharge: HOME | End: 2018-01-03
Payer: MEDICAID

## 2018-01-03 PROCEDURE — 97110 THERAPEUTIC EXERCISES: CPT

## 2018-01-03 PROCEDURE — 97530 THERAPEUTIC ACTIVITIES: CPT

## 2018-01-03 NOTE — PROGRESS NOTES
PHYSICAL THERAPY - DAILY TREATMENT NOTE    Patient Name: Jesus Kennedy. Date: 1/3/2018  : 1975   YES Patient  Verified  Visit #:   2   of     Insurance: Payor: MEDICAID [facility] / Plan: 1500 / Product Type: Medicaid /      In time: 1:00 Out time: 1:45   Total Treatment Time: 45     Medicare Time Tracking (below)   Total Timed Codes (min):  NA 1:1 Treatment Time:  NA     TREATMENT AREA =  CVA    SUBJECTIVE    Pain Level (on 0 to 10 scale):  8  / 10 L knee   Medication Changes/New allergies or changes in medical history, any new surgeries or procedures? NO    If yes, update Summary List   Subjective Functional Status/Changes:  []  No changes reported     \"I am going to do my best to not fall in the next few days. \"   Patient reports he usually falls when he is reaching for things. OBJECTIVE    20 min Therapeutic Exercise:  [x]  See flow sheet   Rationale:      increase ROM, increase strength, improve coordination, improve balance and increase proprioception to improve the patients ability to perform ADL's, gait, and functional mobility with increased safety and independence. 25 min Therapeutic Activity: Gait with HW, gait around cones, sit to stand transfers, EO/EC balance, standing weight shifts, and step ups. Rationale:      increase ROM, increase strength, improve coordination, improve balance and increase proprioception to improve the patients ability to perform ADL's, gait, and functional mobility with increased safety and independence. min Patient Education:  YES  Reviewed HEP   []  Progressed/Changed HEP based on:   No HEP yet     Other Objective/Functional Measures:    EO stance 30\"  EO ROM 30\"  EC stance: 10\"    Tinetti =  (high fall risk)     Post Treatment Pain Level (on 0 to 10) scale:   8   10     ASSESSMENT    Assessment/Changes in Function:     No LOB during gait activities today.   Patient requires frequent VC's for posture during gait and exercises. []  See Progress Note/Recertification   Patient will continue to benefit from skilled PT services to modify and progress therapeutic interventions, address functional mobility deficits, address ROM deficits, address strength deficits, analyze and address soft tissue restrictions, analyze and cue movement patterns, analyze and modify body mechanics/ergonomics, assess and modify postural abnormalities, address imbalance/dizziness and instruct in home and community integration to attain remaining goals. Progress toward goals / Updated goals: · Short Term Goals: To be accomplished in  2-3  weeks:  1. Patient will demonstrate compliance with HEP. 2. Patient will demonstrate ability to transfer sit to stand/stand to sit with L KAFO and HW mod I in preparation for ambulation. Sit to stand transfers   3. Patient will complete Tinetti for further assessment of balance/functional mobility/gait.  MET - see above     PLAN    [x]  Upgrade activities as tolerated YES Continue plan of care   []  Discharge due to :    []  Other:      Therapist: Clarissa Giron PT    Date: 1/3/2018 Time: 1:51 PM     Future Appointments  Date Time Provider Nanda Pappas   1/5/2018 1:00 PM Sherren Abrahams, PT [facility]   1/10/2018 1:00 PM Clarissa Giron PT [facility]   1/12/2018 1:00 PM [address] [place] [address]   1/16/2018 1:00 PM Sherren Abrahams, PT [facility]   1/19/2018 1:00 PM Clarissa Giron PT [facility]   1/24/2018 1:00 PM Clarissa Giron PT [facility]   1/25/2018 1:00 PM Sherren Abrahams, PT [facility]   1/31/2018 1:00 PM Clarissa Giron PT [facility]

## 2018-01-10 ENCOUNTER — HOSPITAL ENCOUNTER (OUTPATIENT)
Dept: PHYSICAL THERAPY | Age: 43
Discharge: HOME | End: 2018-01-10
Payer: MEDICAID

## 2018-01-10 PROCEDURE — 97110 THERAPEUTIC EXERCISES: CPT

## 2018-01-10 PROCEDURE — 97530 THERAPEUTIC ACTIVITIES: CPT

## 2018-01-10 NOTE — PROGRESS NOTES
PHYSICAL THERAPY - DAILY TREATMENT NOTE    Patient Name: Piedad Kelley. Date: 1/10/2018  : 1975   YES Patient  Verified  Visit #:   3     Insurance: Payor: MEDICAID [facility] / Plan: 1500 / Product Type: Medicaid /      In time: 12:50 Out time: 1:35   Total Treatment Time: 45     Medicare Time Tracking (below)   Total Timed Codes (min):  NA 1:1 Treatment Time:  NA     TREATMENT AREA =  CVA    SUBJECTIVE    Pain Level (on 0 to 10 scale):  0  / 10   Medication Changes/New allergies or changes in medical history, any new surgeries or procedures? NO    If yes, update Summary List   Subjective Functional Status/Changes:  []  No changes reported     Reports he is ambulating with KAFO around his home. OBJECTIVE    30 min Therapeutic Exercise:  [x]  See flow sheet   Rationale:      increase ROM, increase strength, improve coordination, improve balance and increase proprioception to improve the patients ability to perform ADL's, gait, and functional mobility with increased safety and independence. 15 min Therapeutic Activity: Supine <> sit transfers; gait with HW   Rationale:      increase ROM, increase strength, improve coordination, improve balance and increase proprioception to improve the patients ability to perform ADL's, gait, and functional mobility with increased safety and independence. min Patient Education:  YES  Reviewed HEP   []  Progressed/Changed HEP based on: Added HEP per handout     Other Objective/Functional Measures:    Gait with ' X 2 with supervision. Added PNF D1 with assistance. Post Treatment Pain Level (on 0 to 10) scale:   0  / 10     ASSESSMENT    Assessment/Changes in Function:     No LOB with gait.      []  See Progress Note/Recertification   Patient will continue to benefit from skilled PT services to modify and progress therapeutic interventions, address functional mobility deficits, address ROM deficits, address strength deficits, analyze and address soft tissue restrictions, analyze and cue movement patterns, analyze and modify body mechanics/ergonomics, assess and modify postural abnormalities, address imbalance/dizziness and instruct in home and community integration to attain remaining goals. Progress toward goals / Updated goals: · Short Term Goals: To be accomplished in  2-3  weeks:  1. Patient will demonstrate compliance with HEP. Added HEP per handout  2. Patient will demonstrate ability to transfer sit to stand/stand to sit with L KAFO and HW mod I in preparation for ambulation. Sit to stand transfers   3. Patient will complete Tinetti for further assessment of balance/functional mobility/gait.  MET      PLAN    [x]  Upgrade activities as tolerated YES Continue plan of care   []  Discharge due to :    []  Other:      Therapist: Leda San PT    Date: 1/10/2018 Time: 12:57 PM     Future Appointments  Date Time Provider Nanda Pappas   1/10/2018 1:00 PM Leda San [facility]   1/12/2018 1:00 PM [address] [place] [address]   1/16/2018 1:00 PM Shar Cabrera [facility]   1/18/2018 9:30 AM Darlene Ashton MD [facility] SCHED   1/19/2018 1:00 PM Leda San [facility]   1/24/2018 1:00 PM Leda San PT [facility]   1/25/2018 1:00 PM Shar Cabrera [facility]   1/31/2018 1:00 PM Leda San [facility]

## 2018-01-12 ENCOUNTER — HOSPITAL ENCOUNTER (OUTPATIENT)
Dept: PHYSICAL THERAPY | Age: 43
Discharge: HOME | End: 2018-01-12
Payer: MEDICAID

## 2018-01-12 PROCEDURE — 97530 THERAPEUTIC ACTIVITIES: CPT

## 2018-01-12 PROCEDURE — 97110 THERAPEUTIC EXERCISES: CPT

## 2018-01-12 NOTE — PROGRESS NOTES
PHYSICAL THERAPY - DAILY TREATMENT NOTE    Patient Name: Mike Echols. Date: 2018  : 1975   YES Patient  Verified  Visit #:   4     Insurance: Payor: MEDICAID [facility] / Plan: [address] / Product Type: Medicaid /      In time: 1:05 Out time: 1:45   Total Treatment Time: 40     Medicare Time Tracking (below)   Total Timed Codes (min):  na 1:1 Treatment Time:  na     TREATMENT AREA =  CVA    SUBJECTIVE    Pain Level (on 0 to 10 scale):  4  / 10   Medication Changes/New allergies or changes in medical history, any new surgeries or procedures? NO    If yes, update Summary List   Subjective Functional Status/Changes:  []  No changes reported     Pt with c/o L knee pain. Pt states he uses his power chair sometimes at home but states \"I try not to. \"         OBJECTIVE    10 min Therapeutic Exercise:  [x]  See flow sheet   Rationale:     increase ROM, increase strength, improve coordination, improve balance and increase proprioception to promote increased functional mobility and increased activity tolerance with ADL's. 30 min Therapeutic Activity: Standing WSing (stagger and lateral)  Sit<>stand with 1 UE A  gait with HW and L KAFO and S   Rationale:  improve coordination, improve balance and increase proprioception to improve the patients ability to perform ADLs, transfers and gait safely and independently. min Patient Education:  YES  Reviewed HEP   []  Progressed/Changed HEP based on: Other Objective/Functional Measures:    Practiced stance in parallel bars with even WBing. TCing at pelvis to facilitate WSing through hips. Pt able to demo even [address] for short periods when cued to shift hips toward L parallel bar. VCing for increased stride length R LE during gait with HW.     VCing to prevent R trunk rotation in standing with WSing ex.  Pt with difficulty performing retro WS onto L LE.     VCing to engage L quad and glute ms with performing ex on R LE in standing. Post Treatment Pain Level (on 0 to 10) scale:   4  / 10     ASSESSMENT    Assessment/Changes in Function:     Pt demo's decreased WBing L LE with standing therapeutic activity. []  See Progress Note/Recertification   Patient will continue to benefit from skilled PT services to modify and progress therapeutic interventions, address functional mobility deficits, address ROM deficits, address strength deficits, analyze and address soft tissue restrictions, analyze and cue movement patterns, analyze and modify body mechanics/ergonomics, assess and modify postural abnormalities, address imbalance/dizziness and instruct in home and community integration to attain remaining goals. Progress toward goals / Updated goals: · Short Term Goals: To be accomplished in  2-3  weeks:  1. Patient will demonstrate compliance with HEP. 2. Patient will demonstrate ability to transfer sit to stand/stand to sit with L KAFO and HW mod I in preparation for ambulation. 3. Patient will complete Tinetti for further assessment of balance/functional mobility/gait. cont gait and balance training  · Long Term Goals: To be accomplished in  4-6  weeks:  1. Patient will demonstrate independence with HEP. 2. Patient will demonstrate ability to ambulate 150' with L KAFO and HW with S in preparation for household ambulation. pt amb  feet with L KAFO and HW  3. Patient will demonstrate 4 point improvement on Tinetti to indicate improved balance/functional mobility/gait.      PLAN    []  Upgrade activities as tolerated YES Continue plan of care   []  Discharge due to :    []  Other:      Therapist: Marisol Ornelas PTA    Date: 1/12/2018 Time: 1:15 PM     Future Appointments  Date Time Provider Nanda Pappas   1/16/2018 1:00 PM Kiana Velez, PT [facility]   1/18/2018 9:30 AM MD FRANSISCO Simmons AUDRE[facility] SCHED   1/19/2018 1:00 PM Kiara Cordon, PT [facility]   1/24/2018 1:00 PM Alexander Green PT [facility]   1/25/2018 1:00 PM Nicolas Moctezuma, PT [facility]   1/31/2018 1:00 PM Alexander Green, PT [facility]

## 2018-01-16 ENCOUNTER — HOSPITAL ENCOUNTER (OUTPATIENT)
Dept: PHYSICAL THERAPY | Age: 43
Discharge: HOME | End: 2018-01-16
Payer: MEDICAID

## 2018-01-16 PROCEDURE — 97530 THERAPEUTIC ACTIVITIES: CPT

## 2018-01-16 NOTE — PROGRESS NOTES
PHYSICAL THERAPY - DAILY TREATMENT NOTE    Patient Name: Tay Hurt. Date: 2018  : 1975   YES Patient  Verified  Visit #:   5   of     Insurance: Payor: MEDICAID [facility] / Plan: 1500 / Product Type: Medicaid /      In time: 12:55 Out time: 1:30   Total Treatment Time: 35     Medicare Time Tracking (below)   Total Timed Codes (min):  NA 1:1 Treatment Time:  NA     TREATMENT AREA =  Gait instability [R26.81]  Stroke (cerebrum) ([facility] [address] [I63.9]  SUBJECTIVE    Pain Level (on 0 to 10 scale):  0  / 10   Medication Changes/New allergies or changes in medical history, any new surgeries or procedures? NO    If yes, update Summary List   Subjective Functional Status/Changes:  []  No changes reported     Pt reports that he wears KAFO every other day for 3-4 hours at home. Pt reports that when he is wearing KAFO, he walking with KAFO and HW.           OBJECTIVE    30 min Therapeutic Activity: FOTO, Tinetti, sit to stand, amb with HW and KAFO   Rationale: assess ADL/IADL function, functional mobility and gait to improve the patient's ability to perform ADLs/IADLs, functional mobility and gait safely and independently without increased pain/symptoms      During TE min Patient Education:  YES  Reviewed HEP   []  Progressed/Changed HEP based on:   Cont HEP     Other Objective/Functional Measures:    FOTO = 21/100  Tinetti = 11/28  Sit to stand from power w/c with S 4 times; amb with HW and KAFO 55', 55', 110' and 110' with S to CG     Post Treatment Pain Level (on 0 to 10) scale:   0  / 10     ASSESSMENT    Assessment/Changes in Function:     See progress note     [x]  See Progress Note/Recertification   Patient will continue to benefit from skilled PT services: see progress note   Progress toward goals / Updated goals:    See progress note     PLAN    [x]  Upgrade activities as tolerated YES Continue plan of care   []  Discharge due to :    []  Other:      Therapist: Satya Her, PT    Date: 1/16/2018 Time: 12:58 PM     Future Appointments  Date Time Provider Nanda Pappas   1/16/2018 1:00 PM Satya Ma, PT [facility]   1/18/2018 9:30 AM [id number]inne Ortiz MD [facility]   1/19/2018 1:00 PM Jazz Dykes, PT [facility]   1/24/2018 1:00 PM Jazz Dykes, PT [facility]   1/25/2018 1:00 PM Satya Ma, PT [facility]   1/31/2018 1:00 PM Jazz Dykes, PT [facility]   2/2/2018 1:00 PM Satya Ma, PT [facility]   2/7/2018 1:00 PM Jazz Dykes PT [facility]   2/9/2018 1:00 PM Jazz Dykes, PT [facility]   2/14/2018 2:00 PM Satya Ma, PT [facility]   2/16/2018 1:00 PM Satya Ma, PT [facility]   2/21/2018 1:00 PM Jazz Dykes PT [facility]   2/23/2018 1:00 PM Satya Ma, PT [facility]   2/26/2018 1:00 PM Jazz Dykes PT [facility]   2/28/2018 1:00 PM Jazz Dykes PT [facility]

## 2018-01-16 NOTE — PROGRESS NOTES
[address] PHYSICAL THERAPY  [address] [place] [address] Martin Reynaldo, [address] - Phone: [phone]  Fax: [phone]  PROGRESS NOTE  Patient Name: Danay Mccarty. : 1975   Treatment/Medical Diagnosis: Gait instability [R26.81]  Stroke (cerebrum) [facility]) [I63.9]   Referral Source: Riley Sykes DO     Date of Initial Visit: 2017 Attended Visits: 5 Missed Visits: 0     SUMMARY OF TREATMENT  Treatment has consisted of initial evaluation and four treatment sessions, which have included functional mobility/gait training, LE strengthening exercises, balance exercises and patient/caregiver education (including HEP). CURRENT STATUS  Patient has progressed with exercises in clinic, and reports compliance with HEP. Patient able to transfer sit to stand from power w/c with L KAFO and HW with supervision and verbal cues for locking/unlocking KAFO. Patient able to ambulated up to 110' with L KAFO and HW with supervision to contact guard. Tinetti =  (high risk of falling). Goal/Measure of Progress Goal Met? 1. Patient will demonstrate compliance with HEP. Status at last Eval: NA Current Status: Reports compliance with HEP yes   2. Patient will demonstrate ability to transfer sit to stand/stand to sit with L KAFO and HW mod I in preparation for ambulation. Status at last Eval: Sit to stand with S and verbal cues Current Status: Sit to stand with S and verbal cues no   3. Patient will complete Tinetti for further assessment of balance/functional mobility/gait. Status at last Eval: NT Current Status: Tinetti =  yes     New Goals to be achieved in __2-4__  weeks:  1. Patient will demonstrate independence with HEP. 2.  Patient will demonstrate ability to ambulate 150' with L KAFO and HW with S in preparation for household ambulation.    3.  Patient will demonstrate 4 point improvement on Tinetti (15/28) to indicate improved balance/functional mobility/gait. 4. Patient will demonstrate ability to transfer sit to stand/stand to sit with L KAFO and HW mod I in preparation for ambulation. RECOMMENDATIONS  Patient would benefit from continued skilled PT services to improve balance and transfers/gait to improve the patient's ability to perform ADLs/IADLs, functional mobility and gait safely and independently without increased pain/symptoms. If you have any questions/comments please contact us directly at [phone]. Thank you for allowing us to assist in the care of your patient. Therapist Signature: Sherren Abrahams, PT Date: 1/16/2018     Time: 2:05 PM   NOTE TO PHYSICIAN:  PLEASE COMPLETE THE ORDERS BELOW AND FAX TO   [facility] Physical Therapy: [phone]. If you are unable to process this request in 24 hours please contact our office: [phone].    ___ I have read the above report and request that my patient continue as recommended.   ___ I have read the above report and request that my patient continue therapy with the following changes/special instructions:_________________________________________________________   ___ I have read the above report and request that my patient be discharged from therapy.      Physician Signature:        Date:       Time:

## 2018-01-19 ENCOUNTER — HOSPITAL ENCOUNTER (OUTPATIENT)
Dept: PHYSICAL THERAPY | Age: 43
Discharge: HOME | End: 2018-01-19
Payer: MEDICAID

## 2018-01-19 PROCEDURE — 97530 THERAPEUTIC ACTIVITIES: CPT

## 2018-01-19 PROCEDURE — 97110 THERAPEUTIC EXERCISES: CPT

## 2018-01-19 NOTE — PROGRESS NOTES
PHYSICAL THERAPY - DAILY TREATMENT NOTE    Patient Name: Josh Rodrigez. Date: 2018  : 1975   YES Patient  Verified  Visit #:   6     Insurance: Payor: MEDICAID [facility] / Plan: 1500 / Product Type: Medicaid /      In time: 1:00 Out time: 1:35   Total Treatment Time: 35     Medicare Time Tracking (below)   Total Timed Codes (min):  Na 1:1 Treatment Time:  NA     TREATMENT AREA =  Gait instability [R26.81]  Stroke (cerebrum) ([facility] [address] [I63.9]    SUBJECTIVE    Pain Level (on 0 to 10 scale):  0  / 10   Medication Changes/New allergies or changes in medical history, any new surgeries or procedures? NO    If yes, update Summary List   Subjective Functional Status/Changes:  []  No changes reported     Patient's father the patient has difficulty covering himself up with the sheet/blankets when getting into bed. Reports he can uncover himself. OBJECTIVE    10 min Therapeutic Exercise:  [x]  See flow sheet   Rationale:      increase strength to improve the patients ability to perform ADL's, gait, and functional mobility with increased safety and independence. 25 min Therapeutic Activity: Gait with HW and L KAFO; supine <> sit transfers while donning/doffing sheet   Rationale:      increase ROM, increase strength and improve coordination to improve the patients ability to perform ADL's, gait, and functional mobility with increased safety and independence. min Patient Education:  YES  Reviewed HEP   []  Progressed/Changed HEP based on:   Cont HEP; added partial sit up to HEP     Other Objective/Functional Measures:    Patient able to ambulate [facility] 42' with HW and L KAFO with supervision. Patient unable to roll to L side to grab sheet when trying to cover himself with the sheet. Patient able to remove sheet without assistance.      Post Treatment Pain Level (on 0 to 10) scale:   0  / 10     ASSESSMENT    Assessment/Changes in Function:     No LOB with ambulation. Patient limited with bed mobility. Requires VC's to ensure L KAFO is locked in extension prior to ambulation. []  See Progress Note/Recertification   Patient will continue to benefit from skilled PT services to modify and progress therapeutic interventions, address functional mobility deficits, address ROM deficits, address strength deficits, analyze and address soft tissue restrictions, analyze and cue movement patterns, analyze and modify body mechanics/ergonomics, assess and modify postural abnormalities, address imbalance/dizziness and instruct in home and community integration to attain remaining goals. Progress toward goals / Updated goals:    New Goals to be achieved in __2-4__  weeks:  1. Patient will demonstrate independence with HEP. Reports compliance with HEP. 2.  Patient will demonstrate ability to ambulate 150' with L KAFO and HW with S in preparation for household ambulation. Ambulated 130' with L KAFO and HW with S   3. Patient will demonstrate 4 point improvement on Tinetti (15/28) to indicate improved balance/functional mobility/gait. 4. Patient will demonstrate ability to transfer sit to stand/stand to sit with L KAFO and HW mod I in preparation for ambulation. Requires VC's to ensure L KAFO is locked in extension prior to ambulation.                PLAN    [x]  Upgrade activities as tolerated YES Continue plan of care   []  Discharge due to :    []  Other:      Therapist: Carina Dwyer PT    Date: 1/19/2018 Time: 2:40 PM     Future Appointments  Date Time Provider Nanda Pappas   1/24/2018 1:00 PM Carina Dwyer PT [facility]   1/25/2018 1:00 PM Radha Ferguson PT [facility]   1/29/2018 2:00 PM [id number] Ortiz MD [facility] SCHED   1/31/2018 1:00 PM Carina Dwyer PT [facility]   2/2/2018 1:00 PM Radha Ferguson PT [facility]   2/7/2018 1:00 PM Carina Dwyer PT [facility]   2/9/2018 1:00 PM Carina Dwyer, PT [facility] 2/14/2018 2:00 PM Filippo Petersen PT [facility]   2/16/2018 1:00 PM Filippo Petersen, PT [facility]   2/21/2018 1:00 PM Bowen Mendiola, PT [facility]   2/23/2018 1:00 PM Filippo Petersen PT [facility]   2/26/2018 1:00 PM Bowen Mendiola, PT [facility]   2/28/2018 1:00 PM Bowen Mendiola, PT [facility]

## 2018-01-24 ENCOUNTER — HOSPITAL ENCOUNTER (OUTPATIENT)
Dept: PHYSICAL THERAPY | Age: 43
Discharge: HOME | End: 2018-01-24
Payer: MEDICAID

## 2018-01-24 PROCEDURE — 97530 THERAPEUTIC ACTIVITIES: CPT

## 2018-01-24 PROCEDURE — 97110 THERAPEUTIC EXERCISES: CPT

## 2018-01-24 NOTE — PROGRESS NOTES
PHYSICAL THERAPY - DAILY TREATMENT NOTE    Patient Name: Sameer Claire. Date: 2018  : 1975   YES Patient  Verified  Visit #:     Insurance: Payor: MEDICAID [facility] / Plan: 1500  / Product Type: Medicaid /      In time: 1:00 Out time: 1:30   Total Treatment Time: 30     Medicare Time Tracking (below)   Total Timed Codes (min):  Na 1:1 Treatment Time:  Na     TREATMENT AREA =  Gait instability [R26.81]  Stroke (cerebrum) ([facility] [address] [I63.9]    SUBJECTIVE    Pain Level (on 0 to 10 scale):  0  / 10   Medication Changes/New allergies or changes in medical history, any new surgeries or procedures? NO    If yes, update Summary List   Subjective Functional Status/Changes:  []  No changes reported     Reports he was able to cover himself back up after he used the urinal this morning. OBJECTIVE    15 min Therapeutic Exercise:  [x]  See flow sheet   Rationale:      increase ROM, increase strength, improve coordination, improve balance and increase proprioception to improve the patients ability to perform household ambulation with KAFO with increased safety and independence. 15 min Therapeutic Activity: Bed mobility of rolling side to side while manipulating sheet and blanket. Rationale:      increase ROM and increase strength to improve the patients ability to perform ADL's and functional mobility with increased safety and independence. min Patient Education:  YES  Reviewed HEP   []  Progressed/Changed HEP based on: Other Objective/Functional Measures:    Patient ambulated 60', 100' and 48' with HW and LKAFO. Patient demonstrated ability to roll onto L side, remove sheet and blanket, roll onto R side, perform supine to sit transfer, then sit to supine transfer, roll onto L side, grab covers and roll onto back bringing sheet and blanket on top of him.      Post Treatment Pain Level (on 0 to 10) scale:   0  / 10 ASSESSMENT    Assessment/Changes in Function:     Patient demonstrates difficulty knowing if L KAFO is in a locked position when ambulating. []  See Progress Note/Recertification   Patient will continue to benefit from skilled PT services to modify and progress therapeutic interventions, address functional mobility deficits, address ROM deficits, address strength deficits, analyze and address soft tissue restrictions, analyze and cue movement patterns, analyze and modify body mechanics/ergonomics, assess and modify postural abnormalities, address imbalance/dizziness and instruct in home and community integration to attain remaining goals. Progress toward goals / Updated goals:    New Goals to be achieved in __2-4__  weeks:  1.  Patient will demonstrate independence with HEP. Reports compliance with HEP. 2.  Patient will demonstrate ability to ambulate 150' with L KAFO and HW with S in preparation for household ambulation. Ambulated 100' with L KAFO and HW with S   3.  Patient will demonstrate 4 point improvement on Tinetti (15/28) to indicate improved balance/functional mobility/gait. 4. Patient will demonstrate ability to transfer sit to stand/stand to sit with L KAFO and HW mod I in preparation for ambulation.  Requires VC's to ensure L KAFO is locked in extension prior to ambulation             PLAN    [x]  Upgrade activities as tolerated YES Continue plan of care   []  Discharge due to :    []  Other:      Therapist: Jv Boone PT    Date: 1/24/2018 Time: 1:34 PM     Future Appointments  Date Time Provider Nanda Pappas   1/25/2018 1:00 PM Maribeth Tinoco PT [facility]   1/29/2018 2:00 PM Cj Garcia MD [facility] SCHED   1/31/2018 1:00 PM Jv Boone PT [facility]   2/2/2018 1:00 PM Maribeth Tinoco PT [facility]   2/7/2018 1:00 PM Jv Boone PT [facility]   2/9/2018 1:00 PM Jv Boone PT [facility]   2/14/2018 2:00 PM Maribeth Tinoco PT [facility]   2/16/2018 1:00 PM Harjinder Gaston, PT [facility]   2/21/2018 1:00 PM Mike Mau, PT [facility]   2/23/2018 1:00 PM Harjinder Gaston, PT [facility]   2/26/2018 1:00 PM Mike Mau, PT [facility]   2/28/2018 1:00 PM Mike Mau, PT [facility]

## 2018-01-25 ENCOUNTER — HOSPITAL ENCOUNTER (OUTPATIENT)
Dept: PHYSICAL THERAPY | Age: 43
Discharge: HOME | End: 2018-01-25
Payer: MEDICAID

## 2018-01-25 PROCEDURE — 97530 THERAPEUTIC ACTIVITIES: CPT

## 2018-01-25 NOTE — PROGRESS NOTES
PHYSICAL THERAPY - DAILY TREATMENT NOTE    Patient Name: Tavon Baca. Date: 2018  : 1975   YES Patient  Verified  Visit #:   8     Insurance: Payor: MEDICAID [facility] / Plan: 1500 / Product Type: Medicaid /      In time: 1:00 Out time: 1:30   Total Treatment Time: 30     Medicare Time Tracking (below)   Total Timed Codes (min):  NA 1:1 Treatment Time:  NA     TREATMENT AREA =  Gait instability [R26.81]  Stroke (cerebrum) ([facility] [address] [I63.9]  SUBJECTIVE    Pain Level (on 0 to 10 scale):  0  / 10   Medication Changes/New allergies or changes in medical history, any new surgeries or procedures? NO    If yes, update Summary List   Subjective Functional Status/Changes:  []  No changes reported     Pt brought a note from his father asking that we adjust his Earnie Sailors and try walking with platform RW because pt is leaning over when walking. Pt and pt's father report that pt is not wearing KAFO consistently due to difficulty getting pants down in bathroom with KAFO on over pants.        OBJECTIVE    30 min Therapeutic Activity: Adjustment of HW; sit to stand with HW, KAFO; amb with HW, KAFO; sit to stand with platform RW and KAFO; amb with platform RW and KAFO; stance and WS in stance with HW and KAFO with mirror for visual cues   Rationale: improve posture, improve balance, improve functional mobility and improve gait to improve the patient's ability to perform ADLs/IADLs, functional mobility and gait safely and independently without increased pain/symptoms      During TA min Patient Education:  YES  Reviewed HEP   []  Progressed/Changed HEP based on:   Cont HEP; called pt's father at pt's request and discussed adjustment of HW, trial of platform RW; also discussed increasing KAFO wear time with pt and father; discussed wearing KAFO with shorts or under pants and using barrier between skin and KAFO if there is irritation (stocking or legging)     Other Objective/Functional Measures:    Raised HW one level to facilitate uprgiht posture in standing  Worked on standing and weight-shifting with HW and KAFO in front of mirror for visual feedback regarding upright posture  Worked on ambulation with HW and KAFO with mirror for visual feedback regarding upright posture  Trialed ambulation with platform RW and KAFO - patient dependent for placing L UE on platform, demonstrates difficulty steering platform RW due to adductor tone L UE affecting steering and demonstrates difficulty avoiding kicking platform RW with L LE  Verbal cues provided for upright posture and for proper technique for locking/unlocking KAFO throughout treatment     Post Treatment Pain Level (on 0 to 10) scale:   0  / 10     ASSESSMENT    Assessment/Changes in Function:     Tolerated exercises without complaints  Unable to ambulate safely with platform RW and KAFO  Able to improve posture with ambulation with HW and KAFO with adjustment of HW and verbal cues/visual feedback     []  See Progress Note/Recertification   Patient will continue to benefit from skilled PT services to modify and progress therapeutic interventions, address functional mobility deficits, address ROM deficits, address strength deficits, analyze and address soft tissue restrictions, analyze and cue movement patterns, analyze and modify body mechanics/ergonomics, assess and modify postural abnormalities, address imbalance/dizziness and instruct in home and community integration to attain remaining goals. Progress toward goals / Updated goals:    Progressing toward goals:  1.  Patient will demonstrate independence with HEP. Reports compliance with HEP. 2.  Patient will demonstrate ability to ambulate 150' with L KAFO and HW with S in preparation for household ambulation.  Ambulated 110' with L KAFO and HW with S   3.  Patient will demonstrate 4 point improvement on Tinetti (15/28) to indicate improved balance/functional mobility/gait. 4. Patient will demonstrate ability to transfer sit to stand/stand to sit with L KAFO and HW mod I in preparation for ambulation.  Requires VC's to ensure L JUANFO is locked in extension prior to ambulation/unlocked prior to sitting            PLAN    [x]  Upgrade activities as tolerated YES Continue plan of care   []  Discharge due to :    []  Other:      Therapist: Fritz Epstein PT    Date: 1/25/2018 Time: 12:58 PM     Future Appointments  Date Time Provider Nanda Pappas   1/25/2018 1:00 PM Fritz Epstein, [address] [place] [address]   1/29/2018 2:00 PM Loni Niño MD [facility]   1/31/2018 1:00 PM Ellen Nazario [facility]   2/2/2018 1:00 PM Fritz Epstein PT [facility]   2/7/2018 1:00 PM Ellen Nazario [facility]   2/9/2018 1:00 PM Ellen Nazario PT [facility]   2/14/2018 2:00 PM Fritz Epstein PT [facility]   2/16/2018 1:00 PM Fritz Epstein PT [facility]   2/21/2018 1:00 PM Ellen Nazario PT [facility]   2/23/2018 1:00 PM Fritz Epstein PT [facility]   2/26/2018 1:00 PM Ellen Nazario PT [facility]   2/28/2018 1:00 PM Ellen Nazario PT [facility]

## 2018-01-29 ENCOUNTER — OFFICE VISIT (OUTPATIENT)
Dept: FAMILY MEDICINE CLINIC | Age: 43
End: 2018-01-29

## 2018-01-29 VITALS
SYSTOLIC BLOOD PRESSURE: 133 MMHG | WEIGHT: 248 LBS | BODY MASS INDEX: 42.34 KG/M2 | TEMPERATURE: 97.7 F | DIASTOLIC BLOOD PRESSURE: 91 MMHG | RESPIRATION RATE: 18 BRPM | HEIGHT: 64 IN | OXYGEN SATURATION: 98 % | HEART RATE: 66 BPM

## 2018-01-29 DIAGNOSIS — I63.031 CEREBROVASCULAR ACCIDENT (CVA) DUE TO THROMBOSIS OF RIGHT CAROTID ARTERY (HCC): ICD-10-CM

## 2018-01-29 DIAGNOSIS — E03.4 HYPOTHYROIDISM DUE TO ACQUIRED ATROPHY OF THYROID: Chronic | ICD-10-CM

## 2018-01-29 DIAGNOSIS — C72.30 GLIOMA OF OPTIC NERVE (HCC): ICD-10-CM

## 2018-01-29 DIAGNOSIS — R04.0 BLEEDING NOSE: ICD-10-CM

## 2018-01-29 DIAGNOSIS — I51.7 SEVERE CONCENTRIC LEFT VENTRICULAR HYPERTROPHY: ICD-10-CM

## 2018-01-29 DIAGNOSIS — E78.00 HYPERCHOLESTEREMIA: ICD-10-CM

## 2018-01-29 DIAGNOSIS — D64.9 ANEMIA, UNSPECIFIED TYPE: ICD-10-CM

## 2018-01-29 DIAGNOSIS — R80.9 MICROALBUMINURIA: ICD-10-CM

## 2018-01-29 DIAGNOSIS — H54.40 BLIND LEFT EYE: ICD-10-CM

## 2018-01-29 DIAGNOSIS — R91.1 LUNG NODULE: Primary | ICD-10-CM

## 2018-01-29 DIAGNOSIS — R79.89 LOW TESTOSTERONE IN MALE: ICD-10-CM

## 2018-01-29 DIAGNOSIS — I66.01 STENOSIS OF RIGHT MIDDLE CEREBRAL ARTERY: ICD-10-CM

## 2018-01-29 PROBLEM — E66.01 OBESITY, MORBID (HCC): Status: ACTIVE | Noted: 2018-01-29

## 2018-01-29 NOTE — MR AVS SNAPSHOT
[address] [id number] 
[phone] Patient: Surinder Nugent MRN: [id number] :1975 Visit Information Date & Time Provider Department Dept. Phone Encounter #  
 2018  2:00 PM Sandra Corea, [address] [address] [id number] Follow-up Instructions Return in about 1 month (around 2018). Upcoming Health Maintenance Date Due DTaP/Tdap/Td series (1 - Tdap) 1994 Influenza Age 5 to Adult 2017 Allergies as of 2018  Review Complete On: 2018 By: Sandra Corea MD  
  
 Severity Noted Reaction Type Reactions Chocolate [Cocoa]  2017    Nausea Only Current Immunizations  Never Reviewed Name Date Pneumococcal Polysaccharide (PPSV-23) 2013 11:28 AM  
  
 Not reviewed this visit You Were Diagnosed With   
  
 Codes Comments Lung nodule    -  Primary ICD-10-CM: R91.1 ICD-9-CM: 793.11 Hypothyroidism due to acquired atrophy of thyroid     ICD-10-CM: E03.4 ICD-9-CM: 244.8, 246.8 Low testosterone in male     ICD-10-CM: E29.1 ICD-9-CM: 257.2 Glioma of optic nerve ([facility] [address]     ICD-10-CM: C72.30 ICD-9-CM: 192.0 Blind left eye     ICD-10-CM: H54.40 ICD-9-CM: 369.60 Stenosis of right middle cerebral artery     ICD-10-CM: I66.01 
ICD-9-CM: 437.0 Anemia, unspecified type     ICD-10-CM: D64.9 ICD-9-CM: 285.9 Severe concentric left ventricular hypertrophy     ICD-10-CM: I51.7 ICD-9-CM: 429.3 Cerebrovascular accident (CVA) due to thrombosis of right carotid artery ([facility] [address]     ICD-10-CM: E59.043 
ICD-9-CM: 433.11 Hypercholesteremia     ICD-10-CM: E78.00 ICD-9-CM: 272.0 Microalbuminuria     ICD-10-CM: R80.9 ICD-9-CM: 791.0 Bleeding nose     ICD-10-CM: R04.0 ICD-9-CM: 645. 7 Vitals BP Pulse Temp Resp Height(growth percentile) Weight(growth percentile) (!) 133/91 66 97.7 °F (36.5 °C) (Oral) 18 5' 4\" (1.626 m) 248 lb (112.5 kg) SpO2 BMI Smoking Status 98% 42.57 kg/m2 Never Smoker BMI and BSA Data Body Mass Index Body Surface Area 42.57 kg/m 2 2.25 m 2 Preferred Pharmacy Pharmacy Name Phone [facility] [place], [address] [place] [address] [phone] Your Updated Medication List  
  
   
This list is accurate as of: 1/29/18  3:00 PM.  Always use your most recent med list.  
  
  
  
  
 OTHER Testosterone PO  
  
 VITAMIN B-12 1,000 mcg tablet Generic drug:  cyanocobalamin Take 1,000 mcg by mouth daily. We Performed the Following REFERRAL TO CARDIOLOGY [FNC31 Custom] REFERRAL TO ONCOLOGY [KTP40 Custom] Follow-up Instructions Return in about 1 month (around 2/28/2018). To-Do List   
 01/29/2018 Lab:  BONG QL, W/REFLEX CASCADE   
  
 01/29/2018 Lab:  ANCA PANEL   
  
 01/29/2018 Lab:  ANGIOTENSIN CONVERTING ENZYME   
  
 01/29/2018 Lab:  C REACTIVE PROTEIN, QT   
  
 01/29/2018 Lab:  CBC WITH AUTOMATED DIFF   
  
 01/29/2018 Lab:  LIPID PANEL   
  
 01/29/2018 Lab:  MICROALBUMIN, UR, RAND W/ MICROALBUMIN/CREA RATIO   
  
 01/29/2018 Lab:  SED RATE (ESR)   
  
 01/29/2018 Lab:  TSH 3RD GENERATION   
  
 01/29/2018 Lab:  URINALYSIS W/ RFLX MICROSCOPIC   
  
 01/31/2018 1:00 PM  
  Appointment with Alexander Green PT at [facility] [address] [facility] [address] ([phone])  
  
 02/02/2018 1:00 PM  
  Appointment with Nicolas Moctezuma PT at [facility] ([phone])  
  
 02/07/2018 1:00 PM  
  Appointment with Alexander Green PT at [facility] ([phone])  
  
 02/09/2018 1:00 PM  
  Appointment with Alexander Green PT at [facility] ([phone])  
  
 02/14/2018 2:00 PM  
  Appointment with Nicolas Moctezuma PT at [facility] ([phone]) 02/16/2018 1:00 PM  
  Appointment with Florian Silver PT at [facility] [address] [facility] ([phone])  
  
 02/21/2018 1:00 PM  
  Appointment with Yancy Sweeney PT at [facility] [address] [facility] ([phone])  
  
 02/23/2018 1:00 PM  
  Appointment with Florian Silver, PT at [facility] [address] [facility] ([phone])  
  
 02/26/2018 1:00 PM  
  Appointment with Yancy Sweeney, PT at [facility] [address] ([phone])  
  
 02/28/2018 1:00 PM  
  Appointment with Yancy Sweeney PT at [facility] [address] [facility] ([phone]) Referral Information Referral ID Referred By Referred To  
  
 [id number] [facility], [address] [place] [address] MD   
   [id number] [facility] [address] [facility] [address] Phone: [phone] Fax: [phone] Visits Status Start Date End Date 1 New Request 1/29/18 1/29/19 If your referral has a status of pending review or denied, additional information will be sent to support the outcome of this decision. Referral ID Referred By Referred To  
 [id number] [facility], [address] [place] [address] Christine James MD  
   [id number] [facility] [address] [facility] Phone: [phone] Fax: [phone] Visits Status Start Date End Date 1 New Request 1/29/18 1/29/19 If your referral has a status of pending review or denied, additional information will be sent to support the outcome of this decision. Introducing hospitals & HEALTH SERVICES! [employer] Tang introduces [employer] patient portal. Now you can access parts of your medical record, email your doctor's office, and request medication refills online. 1. In your internet browser, go to [email or web address] 2. Click on the First Time User? Click Here link in the Sign In box. You will see the New Member Sign Up page. 3. Enter your [employer] Access Code exactly as it appears below.  You will not need to use this code after youve completed the sign-up process. If you do not sign up before the expiration date, you must request a new code. · [employer] Access Code: [email or web address] Expires: 3/4/2018 10:09 AM 
 
4. Enter the last four digits of your Social Security Number (xxxx) and Date of Birth (mm/dd/yyyy) as indicated and click Submit. You will be taken to the next sign-up page. 5. Create a [employer] ID. This will be your [employer] login ID and cannot be changed, so think of one that is secure and easy to remember. 6. Create a [employer] password. You can change your password at any time. 7. Enter your Password Reset Question and Answer. This can be used at a later time if you forget your password. 8. Enter your e-mail address. You will receive e-mail notification when new information is available in [address] 9. Click Sign Up. You can now view and download portions of your medical record. 10. Click the Download Summary menu link to download a portable copy of your medical information. If you have questions, please visit the Frequently Asked Questions section of the [employer] website. Remember, [employer] is NOT to be used for urgent needs. For medical emergencies, dial 911. Now available from your iPhone and Android! Please provide this summary of care documentation to your next provider. Your primary care clinician is listed as Zack Frey. If you have any questions after today's visit, please call [phone].

## 2018-01-29 NOTE — PROGRESS NOTES
Shamir Pelaez. is a 38 y.o.  male and presents with     Chief Complaint   Patient presents with    Extremity Weakness    Carotid Artery Stenosis    Hypothyroidism    Anemia    Blindness    Snoring    Cholesterol Problem       Pt is here to establish care. 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 Pt also had radiation. Pt had stroke that affected his left sided in 2016. Pt had complete blockage of the internal carotid as well as the middle cerebral.  Pt was not felt ot be surgical cancidate for carotid endartectomy. Pt was placed on Plavix and statins. JAY was neg for bubble study. Pt did have sever LVH on ECHO, he does not have h/o HTN. Pt has left sided weakness. Pt has mild speech problem. Pt does snore and has h/o MAURICIO. Pt als oahs h/o low testosterone levels. Als ohad anemia. Pt has mild muscle spasms. Meds make him sleepy . He is blind in rt eye. Pt  Uses a knee brace for left knee. Pt does get nose bleeds related ot Eliquis he is on. Past Medical History:   Diagnosis Date    Blindness of left eye     Hypertension     Thyroid disorder     Wears glasses      Past Surgical History:   Procedure Laterality Date    HX OTHER SURGICAL  2/19/10    excision of lipoma, left temporal lesion, left face cyst, right face lesion, removal of 5 skin tags    HX TUMOR REMOVAL      tumor removed from head when 1years old     Current Outpatient Prescriptions   Medication Sig    cyanocobalamin (VITAMIN B-12) 1,000 mcg tablet Take 1,000 mcg by mouth daily.  OTHER Testosterone PO      No current facility-administered medications for this visit. Health Maintenance   Topic Date Due    DTaP/Tdap/Td series (1 - Tdap) 11/27/1994    Influenza Age 5 to Adult  08/01/2017     Immunization History   Administered Date(s) Administered    Pneumococcal Polysaccharide (PPSV-23) 08/23/2013     No LMP for male patient.         Allergies and Intolerances: Allergies   Allergen Reactions    Chocolate [Cocoa] Nausea Only       Family History:   Family History   Problem Relation Age of Onset    Hypertension Mother     Hypertension Father     Diabetes Father     Cancer Father      colon cancer       Social History:   He  reports that he has never smoked. He has never used smokeless tobacco.  He  reports that he does not drink alcohol.             Review of Systems: pos for snoring, pos for nose bleeds  General: negative for - chills, fatigue, fever, weight change  Psych: negative for - anxiety, depression, irritability or mood swings  ENT: negative for - headaches, hearing change, nasal congestion, oral lesions, sneezing or sore throat  Heme/ Lymph: negative for - bleeding problems, bruising, pallor or swollen lymph nodes  Endo: negative for - hot flashes, polydipsia/polyuria or temperature intolerance  Resp: negative for - cough, shortness of breath or wheezing  CV: negative for - chest pain, edema or palpitations  GI: negative for - abdominal pain, change in bowel habits, constipation, diarrhea or nausea/vomiting  : negative for - dysuria, hematuria, incontinence, pelvic pain or vulvar/vaginal symptoms  MSK: negative for - joint pain, joint swelling or muscle pain  Neuro: negative for - confusion, headaches, seizures ,pos for left sided weakness  Derm: negative for - dry skin, hair changes, rash or skin lesion changes          Physical:   Vitals:   Vitals:    01/29/18 1354   BP: (!) 133/91   Pulse: 66   Resp: 18   Temp: 97.7 °F (36.5 °C)   TempSrc: Oral   SpO2: 98%   Weight: 248 lb (112.5 kg)   Height: 5' 4\" (1.626 m)           Exam:   HEENT- atraumatic,normocephalic, awake, oriented, well nourished,blind left eye, facial asymmetry with left isded facial weakness, ? xanothomas on face  Neck - supple,no enlarged lymph nodes, no JVD, no thyromegaly  Chest- CTA, no rhonchi, no crackles  Heart- rrr, no murmurs / gallop/rub  Abdomen- soft,BS+,NT, no hepatosplenomegaly  Ext - no c/c/edema ,sausage shaped fingers  Neuro- left sided weakness ++, using a knee brace, in wheelchair. Skin - warm,dry, no obvious rashes. Review of Data:   LABS:   Lab Results   Component Value Date/Time    WBC 6.3 08/23/2013 03:36 AM    HGB 8.2 08/23/2013 03:36 AM    HCT 25.2 08/23/2013 03:36 AM    PLATELET 264 24/38/6972 03:36 AM     Lab Results   Component Value Date/Time    Sodium 138 08/21/2013 01:45 AM    Potassium 4.3 08/21/2013 01:45 AM    Chloride 103 08/21/2013 01:45 AM    CO2 23 08/21/2013 01:45 AM    Glucose 84 08/21/2013 01:45 AM    BUN 17 08/21/2013 01:45 AM    Creatinine 1.01 08/21/2013 01:45 AM     No results found for: CHOL, CHOLX, CHLST, CHOLV, HDL, LDL, LDLC, DLDLP, TGLX, TRIGL, TRIGP  No results found for: GPT        Impression / Plan:        ICD-10-CM ICD-9-CM    1. Lung nodule R91.1 793.11 ANGIOTENSIN CONVERTING ENZYME   2. Hypothyroidism due to acquired atrophy of thyroid E03.4 244.8 TSH 3RD GENERATION     246.8    3. Low testosterone in male E29.1 257.2    4. Glioma of optic nerve ([facility] [address] C72.30 192.0    5. Blind left eye H54.40 369.60    6. Stenosis of right middle cerebral artery I66.01 437.0    7. Anemia, unspecified type D64.9 285.9    8. Severe concentric left ventricular hypertrophy I51.7 429.3 REFERRAL TO CARDIOLOGY   9. Cerebrovascular accident (CVA) due to thrombosis of right carotid artery (HCC) I63.031 433.11 REFERRAL TO ONCOLOGY      BONG QL, W/REFLEX CASCADE   10. Hypercholesteremia E78.00 272.0 LIPID PANEL      CBC WITH AUTOMATED DIFF   11. Microalbuminuria R80.9 791.0 URINALYSIS W/ RFLX MICROSCOPIC      MICROALBUMIN, UR, RAND W/ MICROALBUMIN/CREA RATIO   12. Bleeding nose R04.0 784.7 SED RATE (ESR)      C REACTIVE PROTEIN, QT      ANCA PANEL     R/o hypercoaguable disorder. R/o vasculitis    Highly complex and unfortunate pt . Explained to patient risk benefits of the medications. Advised patient to stop meds if having any side effects. Pt verbalized understanding of the instructions. I have discussed the diagnosis with the patient and the intended plan as seen in the above orders. The patient has received an after-visit summary and questions were answered concerning future plans. I have discussed medication side effects and warnings with the patient as well. I have reviewed the plan of care with the patient, accepted their input and they are in agreement with the treatment goals. Reviewed plan of care. Patient has provided input and agrees with goals.     Follow-up Disposition: Not on Clarissa Thomas MD

## 2018-01-31 ENCOUNTER — HOSPITAL ENCOUNTER (OUTPATIENT)
Dept: PHYSICAL THERAPY | Age: 43
Discharge: HOME | End: 2018-01-31
Payer: MEDICAID

## 2018-01-31 PROCEDURE — 97530 THERAPEUTIC ACTIVITIES: CPT

## 2018-01-31 NOTE — PROGRESS NOTES
PHYSICAL THERAPY - DAILY TREATMENT NOTE    Patient Name: Carolin Auguste. Date: 2018  : 1975   YES Patient  Verified  Visit #:     Insurance: Payor: MEDICAID [facility] / Plan: 1500 / Product Type: Medicaid /      In time: 1:00 Out time: 1:35   Total Treatment Time: 35     Medicare Time Tracking (below)   Total Timed Codes (min):  35 1:1 Treatment Time:  35     TREATMENT AREA =  Gait instability [R26.81]  Stroke (cerebrum) (HCC) [I63.9]    SUBJECTIVE    Pain Level (on 0 to 10 scale):  0  / 10   Medication Changes/New allergies or changes in medical history, any new surgeries or procedures? NO    If yes, update Summary List   Subjective Functional Status/Changes:  []  No changes reported     Reports his HW has been working much better at home since the adjustment last treatment. OBJECTIVE    35 min Therapeutic Activity: Gait with HW and KAFO for 110' X 2; sit to stand transfers focusing on patient locking KAFO upon standing. Rationale:      increase ROM, increase strength, improve coordination, improve balance and increase proprioception to improve the patients ability to perform ADL's, gait, and functional mobility with increased safety and independence. Other Objective/Functional Measures:    Ambulated 110' X 2 with HW, KAFO, and S. He required mod A from PT to lock KAFO upon standing in preparation for ambulation. Post Treatment Pain Level (on 0 to 10) scale:   0  / 10     ASSESSMENT    Assessment/Changes in Function:     Patient with difficulty locking KAFO initially. However, after 3-4 sit <> stand transfers, he was able to lock KAFO consistently.        []  See Progress Note/Recertification   Patient will continue to benefit from skilled PT services to modify and progress therapeutic interventions, address functional mobility deficits, address ROM deficits, address strength deficits, analyze and address soft tissue restrictions, analyze and cue movement patterns, analyze and modify body mechanics/ergonomics, assess and modify postural abnormalities, address imbalance/dizziness and instruct in home and community integration to attain remaining goals. Progress toward goals / Updated goals:    Progressing toward goals:  1.  Patient will demonstrate independence with HEP. Reports compliance with HEP. 2.  Patient will demonstrate ability to ambulate 150' with L KAFO and HW with S in preparation for household ambulation. Ambulated 110' with L KAFO and HW with S   3.  Patient will demonstrate 4 point improvement on Tinetti (15/28) to indicate improved balance/functional mobility/gait. 4. Patient will demonstrate ability to transfer sit to stand/stand to sit with L KAFO and HW mod I in preparation for ambulation. Requires min/mod A to ensure L KAFO is locked in extension prior to ambulation initially but later is able to lock KAFO independently during sit to stand transfers.            PLAN    [x]  Upgrade activities as tolerated YES Continue plan of care   []  Discharge due to :    []  Other:      Therapist: Emma Iverson PT    Date: 1/31/2018 Time: 1:08 PM     Future Appointments  Date Time Provider Nanda Pappsa   2/2/2018 1:00 PM Juvencio Velez, PT [facility]   2/7/2018 1:00 PM Emma Iverson PT [facility]   2/9/2018 1:00 PM Emma Iverson, PT [facility]   2/14/2018 2:00 PM Juvencio Coursegisela, PT [facility]   2/16/2018 1:00 PM Juvencio Velez, PT [facility]   2/21/2018 1:00 PM Emma Iverson, PT [facility]   2/23/2018 1:00 PM Juvencio Coursegisela, PT [facility]   2/26/2018 1:00 PM Emma Iverson, PT [facility]   2/27/2018 12:45 PM [id number]inne [address]iz MD [facility]   2/28/2018 1:00 PM Emma Iverson, [facility]

## 2018-02-01 ENCOUNTER — HOSPITAL ENCOUNTER (OUTPATIENT)
Dept: LAB | Age: 43
Discharge: HOME | End: 2018-02-01
Payer: MEDICAID

## 2018-02-01 DIAGNOSIS — E78.00 HYPERCHOLESTEREMIA: ICD-10-CM

## 2018-02-01 DIAGNOSIS — R04.0 BLEEDING NOSE: ICD-10-CM

## 2018-02-01 DIAGNOSIS — I63.031 CEREBROVASCULAR ACCIDENT (CVA) DUE TO THROMBOSIS OF RIGHT CAROTID ARTERY (HCC): ICD-10-CM

## 2018-02-01 DIAGNOSIS — R80.9 MICROALBUMINURIA: ICD-10-CM

## 2018-02-01 DIAGNOSIS — E03.4 HYPOTHYROIDISM DUE TO ACQUIRED ATROPHY OF THYROID: Chronic | ICD-10-CM

## 2018-02-01 DIAGNOSIS — R91.1 LUNG NODULE: ICD-10-CM

## 2018-02-01 LAB
APPEARANCE UR: CLEAR
BASOPHILS # BLD: 0.1 K/UL (ref 0–0.06)
BASOPHILS NFR BLD: 1 % (ref 0–2)
BILIRUB UR QL: NEGATIVE
CHOLEST SERPL-MCNC: 136 MG/DL
COLOR UR: YELLOW
CRP SERPL-MCNC: 1.4 MG/DL (ref 0–0.3)
DIFFERENTIAL METHOD BLD: ABNORMAL
EOSINOPHIL # BLD: 0.2 K/UL (ref 0–0.4)
EOSINOPHIL NFR BLD: 4 % (ref 0–5)
ERYTHROCYTE [DISTWIDTH] IN BLOOD BY AUTOMATED COUNT: 14.5 % (ref 11.6–14.5)
ERYTHROCYTE [SEDIMENTATION RATE] IN BLOOD: 35 MM/HR (ref 0–15)
GLUCOSE UR STRIP.AUTO-MCNC: NEGATIVE MG/DL
HCT VFR BLD AUTO: 34.9 % (ref 36–48)
HDLC SERPL-MCNC: 39 MG/DL (ref 40–60)
HDLC SERPL: 3.5 {RATIO} (ref 0–5)
HGB BLD-MCNC: 11.1 G/DL (ref 13–16)
HGB UR QL STRIP: NEGATIVE
KETONES UR QL STRIP.AUTO: NEGATIVE MG/DL
LDLC SERPL CALC-MCNC: 54.8 MG/DL (ref 0–100)
LEUKOCYTE ESTERASE UR QL STRIP.AUTO: NEGATIVE
LIPID PROFILE,FLP: ABNORMAL
LYMPHOCYTES # BLD: 2.2 K/UL (ref 0.9–3.6)
LYMPHOCYTES NFR BLD: 33 % (ref 21–52)
MCH RBC QN AUTO: 29.3 PG (ref 24–34)
MCHC RBC AUTO-ENTMCNC: 31.8 G/DL (ref 31–37)
MCV RBC AUTO: 92.1 FL (ref 74–97)
MONOCYTES # BLD: 0.4 K/UL (ref 0.05–1.2)
MONOCYTES NFR BLD: 6 % (ref 3–10)
NEUTS SEG # BLD: 3.8 K/UL (ref 1.8–8)
NEUTS SEG NFR BLD: 56 % (ref 40–73)
NITRITE UR QL STRIP.AUTO: NEGATIVE
PH UR STRIP: 5 [PH] (ref 5–8)
PLATELET # BLD AUTO: 309 K/UL (ref 135–420)
PMV BLD AUTO: 10.8 FL (ref 9.2–11.8)
PROT UR STRIP-MCNC: NEGATIVE MG/DL
RBC # BLD AUTO: 3.79 M/UL (ref 4.7–5.5)
SP GR UR REFRACTOMETRY: 1.02 (ref 1–1.03)
TRIGL SERPL-MCNC: 211 MG/DL (ref ?–150)
TSH SERPL DL<=0.05 MIU/L-ACNC: 3.75 UIU/ML (ref 0.36–3.74)
UROBILINOGEN UR QL STRIP.AUTO: 0.2 EU/DL (ref 0.2–1)
VLDLC SERPL CALC-MCNC: 42.2 MG/DL
WBC # BLD AUTO: 6.7 K/UL (ref 4.6–13.2)

## 2018-02-01 PROCEDURE — 36415 COLL VENOUS BLD VENIPUNCTURE: CPT | Performed by: INTERNAL MEDICINE

## 2018-02-01 PROCEDURE — 83520 IMMUNOASSAY QUANT NOS NONAB: CPT | Performed by: INTERNAL MEDICINE

## 2018-02-01 PROCEDURE — 86140 C-REACTIVE PROTEIN: CPT | Performed by: INTERNAL MEDICINE

## 2018-02-01 PROCEDURE — 86038 ANTINUCLEAR ANTIBODIES: CPT | Performed by: INTERNAL MEDICINE

## 2018-02-01 PROCEDURE — 84443 ASSAY THYROID STIM HORMONE: CPT | Performed by: INTERNAL MEDICINE

## 2018-02-01 PROCEDURE — 85025 COMPLETE CBC W/AUTO DIFF WBC: CPT | Performed by: INTERNAL MEDICINE

## 2018-02-01 PROCEDURE — 85652 RBC SED RATE AUTOMATED: CPT | Performed by: INTERNAL MEDICINE

## 2018-02-01 PROCEDURE — 82164 ANGIOTENSIN I ENZYME TEST: CPT | Performed by: INTERNAL MEDICINE

## 2018-02-01 PROCEDURE — 80061 LIPID PANEL: CPT | Performed by: INTERNAL MEDICINE

## 2018-02-01 PROCEDURE — 81003 URINALYSIS AUTO W/O SCOPE: CPT | Performed by: INTERNAL MEDICINE

## 2018-02-01 PROCEDURE — 82043 UR ALBUMIN QUANTITATIVE: CPT | Performed by: INTERNAL MEDICINE

## 2018-02-02 ENCOUNTER — HOSPITAL ENCOUNTER (OUTPATIENT)
Dept: PHYSICAL THERAPY | Age: 43
Discharge: HOME | End: 2018-02-02
Payer: MEDICAID

## 2018-02-02 LAB
ACE SERPL-CCNC: 27 U/L (ref 14–82)
ANA SER QL: NEGATIVE
C-ANCA TITR SER IF: NORMAL TITER
CREAT UR-MCNC: 179.01 MG/DL (ref 30–125)
MICROALBUMIN UR-MCNC: 1.9 MG/DL (ref 0–3)
MICROALBUMIN/CREAT UR-RTO: 11 MG/G (ref 0–30)
MYELOPEROXIDASE AB SER IA-ACNC: <9 U/ML (ref 0–9)
P-ANCA ATYPICAL TITR SER IF: NORMAL TITER
P-ANCA TITR SER IF: NORMAL TITER
PROTEINASE3 AB SER IA-ACNC: <3.5 U/ML (ref 0–3.5)
SEE BELOW:, 164879: NORMAL

## 2018-02-02 PROCEDURE — 97530 THERAPEUTIC ACTIVITIES: CPT

## 2018-02-02 NOTE — PROGRESS NOTES
PHYSICAL THERAPY - DAILY TREATMENT NOTE    Patient Name: Laxmi Key. Date: 2018  : 1975   YES Patient  Verified  Visit #:   10     Insurance: Payor: [address] [place] [address] / Plan:     / Product Type: Medicaid /      In time: 1:00 Out time: 1:35   Total Treatment Time: 35     Medicare Time Tracking (below)   Total Timed Codes (min):  NA 1:1 Treatment Time:  NA     TREATMENT AREA =  Gait instability [R26.81]  Stroke (cerebrum) ([facility] [address] [I63.9]  SUBJECTIVE    Pain Level (on 0 to 10 scale):  0  / 10   Medication Changes/New allergies or changes in medical history, any new surgeries or procedures? NO    If yes, update Summary List   Subjective Functional Status/Changes:  []  No changes reported     Pt reports working on stance eyes open and eyes closed at home daily. Pt reports doing LE strengthening exercises at home twice per week. Pt reports that he is not wearing his KAFO every day; states he is not doing it because he is \"lazy\". Pt states that he feels like it is more difficulty to lock KAFO than usual, states that he is going to call .          OBJECTIVE    35 min Therapeutic Activity: Sit to stand, ambulation with KAFO and HW   Rationale: improve functional mobility and gait to improve the patient's ability to perform ADLs/IADLs, functional mobility and gait safely and independently without increased pain/symptoms      During TE min Patient Education:  YES  Reviewed HEP   []  Progressed/Changed HEP based on:   Cont HEP; increase KAFO wearing time; contact  regarding difficulty locking/unlocking     Other Objective/Functional Measures:    Transfers sit to stand with S using KAFO and HW; requires min A to lock KAFO/unlock KAFO due to KAFO intermittently not locking/unlocking without application of force by PT  Ambulates with S with KAFO and HW     Post Treatment Pain Level (on 0 to 10) scale:   0  / 10     ASSESSMENT    Assessment/Changes in Function:     Increased difficulty locking/unlocking KAFO this session     []  See Progress Note/Recertification   Patient will continue to benefit from skilled PT services to modify and progress therapeutic interventions, address functional mobility deficits, address ROM deficits, address strength deficits, analyze and address soft tissue restrictions, analyze and cue movement patterns, analyze and modify body mechanics/ergonomics, assess and modify postural abnormalities, address imbalance/dizziness and instruct in home and community integration to attain remaining goals. Progress toward goals / Updated goals:    Progressing toward goals:  1.  Patient will demonstrate independence with HEP. Reports compliance with HEP. 2.  Patient will demonstrate ability to ambulate 150' with L KAFO and HW with S in preparation for household ambulation. Ambulated 110' with L KAFO and HW with S   3.  Patient will demonstrate 4 point improvement on Tinetti (15/28) to indicate improved balance/functional mobility/gait. 4. Patient will demonstrate ability to transfer sit to stand/stand to sit with L KAFO and HW mod I in preparation for ambulation.  Requires min A to ensure L KAFO is locked in extension prior to ambulation/unlocked prior to sitting               PLAN    [x]  Upgrade activities as tolerated YES Continue plan of care   []  Discharge due to :    []  Other:      Therapist: Juan Ramon Goddard PT    Date: 2/2/2018 Time: 12:57 PM     Future Appointments  Date Time Provider Nanda Pappas   2/2/2018 1:00 PM Juan Ramon Goddard PT [facility]   2/7/2018 1:00 PM Holly Holter, PT [facility]   2/9/2018 1:00 PM Holly Holter, PT [facility]   2/14/2018 2:00 PM Juan Ramon Goddard PT [facility]   2/16/2018 1:00 PM Juan Ramon Goddard PT [facility]   2/21/2018 1:00 PM Holly Holter, PT [facility]   2/23/2018 1:00 PM Juan Ramon Goddard [facility]   2/26/2018 1:00 PM Holly Holter, [facility] 2/27/2018 12:45 PM MD FRANSISCO Huerta SCHED   2/28/2018 1:00 PM Jazz Dykes PT [facility]

## 2018-02-07 ENCOUNTER — HOSPITAL ENCOUNTER (OUTPATIENT)
Dept: PHYSICAL THERAPY | Age: 43
Discharge: HOME | End: 2018-02-07
Payer: MEDICAID

## 2018-02-07 PROCEDURE — 97530 THERAPEUTIC ACTIVITIES: CPT

## 2018-02-07 NOTE — PROGRESS NOTES
PHYSICAL THERAPY - DAILY TREATMENT NOTE    Patient Name: Kristine Wilkes. Date: 2018  : 1975   YES Patient  Verified  Visit #:     Insurance: Payor: [place] / Plan: 1500 / Product Type: Medicaid /      In time: 1:00 Out time: 1:30   Total Treatment Time: 30     Medicare Time Tracking (below)   Total Timed Codes (min):  NA 1:1 Treatment Time:  NA     TREATMENT AREA =  Gait instability [R26.81]  Stroke (cerebrum) ([facility] [address] [I63.9]    SUBJECTIVE    Pain Level (on 0 to 10 scale):  0  / 10   Medication Changes/New allergies or changes in medical history, any new surgeries or procedures? NO    If yes, update Summary List   Subjective Functional Status/Changes:  []  No changes reported     \"I haven't called  yet about the KAFO because my mom had surgery on her hip on Monday. \"          OBJECTIVE    30 min Therapeutic Activity: Sit <> stand, ambulation with KAFO and HW, LOS test on balance master   Rationale:      increase ROM, increase strength, improve coordination, improve balance and increase proprioception to improve the patients ability to perform ADL's, gait, and functional mobility with increased safety and independence. min Patient Education:  YES  Reviewed HEP   []  Progressed/Changed HEP based on:   Cont HEP; advised to contact  regarding difficulty locking KAFO     Other Objective/Functional Measures:    Patient ambulated 220' with KAFO and HW. Patient required mod A from therapist to lock KAFO after transferring from sit to stand. Patient unable to lock KAFO today without assistance. Limits of Stability Test: decreased reaction time with rightward weight shifts, decreased movement velocity with weight shifts in the forward direction, decreased directional control with weight shifts in the backward direction, and decreased end point/max excursion with weight shifts in the forward and leftward directions.      Post Treatment Pain Level (on 0 to 10) scale:   0  / 10     ASSESSMENT    Assessment/Changes in Function:     Patient continues to have difficulty locking KAFO in preparation for ambulation. []  See Progress Note/Recertification   Patient will continue to benefit from skilled PT services to modify and progress therapeutic interventions, address functional mobility deficits, address ROM deficits, address strength deficits, analyze and address soft tissue restrictions, analyze and cue movement patterns, analyze and modify body mechanics/ergonomics, assess and modify postural abnormalities, address imbalance/dizziness and instruct in home and community integration to attain remaining goals. Progress toward goals / Updated goals:    Progressing toward goals:  1.  Patient will demonstrate independence with HEP. Reports compliance with HEP. 2.  Patient will demonstrate ability to ambulate 150' with L KAFO and HW with S in preparation for household ambulation. Ambulated 220' with L KAFO and HW with S   3.  Patient will demonstrate 4 point improvement on Tinetti (15/28) to indicate improved balance/functional mobility/gait. 4. Patient will demonstrate ability to transfer sit to stand/stand to sit with L KAFO and HW mod I in preparation for ambulation. Requires mod A to ensure L KAFO is locked in extension prior to ambulation.              PLAN    [x]  Upgrade activities as tolerated YES Continue plan of care   []  Discharge due to :    []  Other:      Therapist: Daina Romeo PT    Date: 2/7/2018 Time: 2:55 PM     Future Appointments  Date Time Provider Nanda Pappas   2/9/2018 1:00 PM Daina Romeo, [address] [place] [address]   2/14/2018 2:00 PM Rochelle Curry PT [facility]   2/16/2018 1:00 PM Rochelle Curry PT [facility]   2/21/2018 1:00 PM Daina Romeo PT [facility]   2/23/2018 1:00 PM Rochelle Curry PT [facility]   2/26/2018 1:00 PM Daina Romeo PT [facility]   2/27/2018 12:45 PM [id number] Corinne Ortiz MD [facility]   2/28/2018 1:00 PM Alexander Green PT [facility]

## 2018-02-09 ENCOUNTER — HOSPITAL ENCOUNTER (OUTPATIENT)
Dept: PHYSICAL THERAPY | Age: 43
Discharge: HOME | End: 2018-02-09
Payer: MEDICAID

## 2018-02-09 PROCEDURE — 97530 THERAPEUTIC ACTIVITIES: CPT

## 2018-02-09 NOTE — PROGRESS NOTES
[address] [facility] PHYSICAL THERAPY  [address] [facility] [address], [address] - Phone: [phone]  Fax: [phone]  PROGRESS NOTE  Patient Name: Lizzy Hartley. : 1975   Treatment/Medical Diagnosis: Gait instability [R26.81]  Stroke (cerebrum) [facility]) [I63.9]   Referral Source: Florence Jeffers DO     Date of Initial Visit: 2017 Attended Visits: 12 Missed Visits: 0     SUMMARY OF TREATMENT  Treatment has consisted of functional mobility and gait training with KAFO on L LE, balance training, patient/caregiver education, and home exercise program.  CURRENT STATUS  Patient is having more difficulty locking KAFO recently and requires min/mod A from therapist to lock KAFO in preparation for ambulation. Have encouraged patient to contact [facility] to inquire about possible adjustments to KAFO to allow for patient to lock KAFO independently. His Tinetti score improved to  (placing him in a high fall risk category). He is able to walk 220' with HW and supervision with L KAFO. Goal/Measure of Progress Goal Met? 1. Patient will demonstrate independence with HEP. Status at last Eval: Compliant with HEP Current Status: Compliant with HEP yes   2. Patient will demonstrate ability to ambulate 150' with L KAFO and HW with S in preparation for household ambulation. Status at last Eval: 80' with L KAFO with HW Current Status: 26' with L KAFO and HW yes   3. Patient will demonstrate 4 point improvement on Tinetti (15/28) to indicate improved balance/functional mobility/gait. Status at last Eval:  Current Status:  progressing   4. Patient will demonstrate ability to transfer sit to stand/stand to sit with L KAFO and HW mod I in preparation for ambulation.    Status at last Eval: Sit <> stand with supervision Current Status: Sit <> with supervision but requires min/mod A with locking KAFO progressing     New Goals to be achieved in __2-4__ weeks: 1. Patient will demonstrate independence with HEP. 2.  Patient will demonstrate 4 point improvement on Tinetti (15/28) to indicate improved balance/functional mobility/gait. 3.  Patient will demonstrate ability to transfer sit to stand/stand to sit and lock/unlock L KAFO with S in preparation for ambulation. G-Codes: NA  RECOMMENDATIONS  Continue PT 2x/week for 2-4 more weeks to promote independence with locking/unlocking L KAFO to improve safety with functional mobility and gait. If you have any questions/comments please contact us directly at [phone]. Thank you for allowing us to assist in the care of your patient. Therapist Signature: Owen Rojas PT Date: 2/9/2018     Time: 1:52 PM   NOTE TO PHYSICIAN:  PLEASE COMPLETE THE ORDERS BELOW AND FAX TO   [facility] Physical Therapy: [phone]. If you are unable to process this request in 24 hours please contact our office: [phone].    ___ I have read the above report and request that my patient continue as recommended.   ___ I have read the above report and request that my patient continue therapy with the following changes/special instructions:_________________________________________________________   ___ I have read the above report and request that my patient be discharged from therapy.      Physician Signature:        Date:       Time:

## 2018-02-09 NOTE — PROGRESS NOTES
PHYSICAL THERAPY - DAILY TREATMENT NOTE    Patient Name: Daniel Levi. Date: 2018  : 1975   YES Patient  Verified  Visit #:     Insurance: Payor: [place] / Plan: 1500 / Product Type: Medicaid /      In time: 1:00 Out time: 1:35   Total Treatment Time: 35     Medicare Time Tracking (below)   Total Timed Codes (min):  NA 1:1 Treatment Time:  NA     TREATMENT AREA =  Gait instability [R26.81]  Stroke (cerebrum) ([facility] [address] [I63.9]    SUBJECTIVE    Pain Level (on 0 to 10 scale):  0  / 10   Medication Changes/New allergies or changes in medical history, any new surgeries or procedures? NO    If yes, update Summary List   Subjective Functional Status/Changes:  []  No changes reported     Patient continues to report difficulty locking KAFO upon standing. States he thinks his father called  but he is not sure. OBJECTIVE    35 min Therapeutic Activity: Sit <> stand focusing on locking KAFO; ambulation with HW and KAFO, balance exercises per flowsheet, tap ups   Rationale:      increase ROM, increase strength, improve coordination, improve balance and increase proprioception to improve the patients ability to perform ADL's, gait, and functional mobility with increased safety and independence. min Patient Education:  YES  Reviewed HEP   []  Progressed/Changed HEP based on:   Cont HEP     Other Objective/Functional Measures:    Ambulated 150' with HW and S. Patient requires min/mod A with locking KAFO.   Tinetti =      Post Treatment Pain Level (on 0 to 10) scale:   0  / 10     ASSESSMENT    Assessment/Changes in Function:     See PN to MD     []  See Progress Note/Recertification   Patient will continue to benefit from skilled PT services to modify and progress therapeutic interventions, address functional mobility deficits, address ROM deficits, address strength deficits, analyze and address soft tissue restrictions, analyze and cue movement patterns, analyze and modify body mechanics/ergonomics, assess and modify postural abnormalities, address imbalance/dizziness and instruct in home and community integration to attain remaining goals.      Progress toward goals / Updated goals:    See PN to MD     PLAN    [x]  Upgrade activities as tolerated YES Continue plan of care   []  Discharge due to :    []  Other:      Therapist: Santos Collazo PT    Date: 2/9/2018 Time: 12:54 PM     Future Appointments  Date Time Provider Nanda Pappas   2/9/2018 1:00 PM Santos Collazo, [address] [place] [address]   2/14/2018 2:00 PM Kandis Cushing, [facility]   2/16/2018 1:30 PM Ariel Lara MD [address] [place] [address]   2/21/2018 1:00 PM Santos Collazo, PT [facility]   2/23/2018 1:00 PM Kandis Cushing, PT [facility]   2/26/2018 1:00 PM Santos Collazo PT [facility]   2/27/2018 12:45 PM Courtney Kaba MD [facility] SCHED   2/28/2018 1:00 PM Santos Collazo, [facility]

## 2018-02-14 ENCOUNTER — APPOINTMENT (OUTPATIENT)
Dept: PHYSICAL THERAPY | Age: 43
End: 2018-02-14
Payer: MEDICAID

## 2018-02-16 ENCOUNTER — APPOINTMENT (OUTPATIENT)
Dept: PHYSICAL THERAPY | Age: 43
End: 2018-02-16
Payer: MEDICAID

## 2018-02-21 ENCOUNTER — HOSPITAL ENCOUNTER (OUTPATIENT)
Dept: PHYSICAL THERAPY | Age: 43
Discharge: HOME | End: 2018-02-21
Payer: MEDICAID

## 2018-02-21 PROCEDURE — 97530 THERAPEUTIC ACTIVITIES: CPT

## 2018-02-21 NOTE — PROGRESS NOTES
PHYSICAL THERAPY - DAILY TREATMENT NOTE    Patient Name: Josh Rodrigez. Date: 2018  : 1975   YES Patient  Verified  Visit #:     Insurance: Payor: [address] [place] [address] / Plan: 1500    / Product Type: Medicaid /      In time: 12:50 Out time: 1:35   Total Treatment Time: 45     Medicare Time Tracking (below)   Total Timed Codes (min):  45 1:1 Treatment Time:  45     TREATMENT AREA =  Gait instability [R26.81]  Stroke (cerebrum) ([facility]) [I63.9]    SUBJECTIVE    Pain Level (on 0 to 10 scale):  0  / 10   Medication Changes/New allergies or changes in medical history, any new surgeries or procedures? NO    If yes, update Summary List   Subjective Functional Status/Changes:  []  No changes reported     States he started feeling \"really weak\" last week. States he went to [facility]. to visit his mother and the nurse took his blood pressure. States his BP was \"80 something over something. \"  States his dad took him to the ER. States his RBC count was low. States he was admitted to [address] [place] [address] Road he thinks he stayed in the hospital for 4 days. A colonoscopy and an endoscopy was performed which showed no abnormalities. States his RBC returned to normal and he was told he was dehydrated. He has blood in his stool. States he has had blood in his stool for years. States he has a f/u with a GI doctor on Friday. States he participated in PT while in the hospital.  Spoke with Dr. María Watt office and obtained verbal clearance to resume PT. Patient reports he has an appointment with [facility] regarding KAFO. States his father knows when the appt is but he does not. States he is interested in the day rehab program by [facility].          OBJECTIVE    45 min Therapeutic Activity: Sit <> stand focusing on locking KAFO; ambulation with HW and left KAFO; balance exercise and tap ups   Rationale:      increase strength, improve coordination, improve balance and increase proprioception to improve the patients ability to perform ADL's, gait, and functional mobility with increased safety and independence. min Patient Education:  YES  Reviewed HEP   []  Progressed/Changed HEP based on:   Cont HEP     Other Objective/Functional Measures:    BP: 132/70; HR: 59 bpm; O2 sat rate: 99%    Patient consistently requires mod A with locking left KAFO. Post Treatment Pain Level (on 0 to 10) scale:   0  / 10     ASSESSMENT    Assessment/Changes in Function:     Tolerated exercises without c/o's.     []  See Progress Note/Recertification   Patient will continue to benefit from skilled PT services to modify and progress therapeutic interventions, address functional mobility deficits, address ROM deficits, address strength deficits, analyze and address soft tissue restrictions, analyze and cue movement patterns, analyze and modify body mechanics/ergonomics, assess and modify postural abnormalities, address imbalance/dizziness and instruct in home and community integration to attain remaining goals. Progress toward goals / Updated goals:    New Goals to be achieved in __2-4__  weeks:  1. Patient will demonstrate independence with HEP. Cont HEP   2. Patient will demonstrate 4 point improvement on Tinetti (15/28) to indicate improved balance/functional mobility/gait. Cont ambulation with RW and left KAFO   3. Patient will demonstrate ability to transfer sit to stand/stand to sit and lock/unlock L KAFO with S in preparation for ambulation. Patient continues to require mod A with locking left KAFO.           PLAN    [x]  Upgrade activities as tolerated YES Continue plan of care   []  Discharge due to :    []  Other:      Therapist: Clifton Caraballo PT    Date: 2/21/2018 Time: 12:54 PM     Future Appointments  Date Time Provider Nanda Pappas   2/21/2018 1:00 PM Clifton Caraballo PT [facility]   2/22/2018 3:15 PM Ariel Delgado MD [address] [place] [address] 2/26/2018 1:00 PM Wade Sexton PT [facility]   2/27/2018 12:45 PM Flako Fernandez MD [facility]   2/28/2018 1:00 PM Wade Sexton PT [facility]

## 2018-02-22 ENCOUNTER — OFFICE VISIT (OUTPATIENT)
Dept: CARDIOLOGY CLINIC | Age: 43
End: 2018-02-22

## 2018-02-22 VITALS
OXYGEN SATURATION: 99 % | WEIGHT: 234 LBS | BODY MASS INDEX: 39.95 KG/M2 | HEIGHT: 64 IN | HEART RATE: 64 BPM | DIASTOLIC BLOOD PRESSURE: 77 MMHG | SYSTOLIC BLOOD PRESSURE: 117 MMHG

## 2018-02-22 DIAGNOSIS — I10 ESSENTIAL HYPERTENSION WITH GOAL BLOOD PRESSURE LESS THAN 140/90: Primary | ICD-10-CM

## 2018-02-22 DIAGNOSIS — E78.00 PURE HYPERCHOLESTEROLEMIA: ICD-10-CM

## 2018-02-22 DIAGNOSIS — R60.0 LOCALIZED EDEMA: ICD-10-CM

## 2018-02-22 DIAGNOSIS — E66.01 MORBID OBESITY, UNSPECIFIED OBESITY TYPE (HCC): ICD-10-CM

## 2018-02-22 RX ORDER — ASPIRIN 81 MG/1
81 TABLET ORAL
COMMUNITY
Start: 2018-02-15 | End: 2019-03-01 | Stop reason: SDUPTHER

## 2018-02-22 RX ORDER — LEVOTHYROXINE SODIUM 100 UG/1
TABLET ORAL
Refills: 3 | COMMUNITY
Start: 2018-02-07 | End: 2018-02-27 | Stop reason: DRUGHIGH

## 2018-02-22 RX ORDER — LANOLIN ALCOHOL/MO/W.PET/CERES
325 CREAM (GRAM) TOPICAL
COMMUNITY
End: 2019-03-01 | Stop reason: ALTCHOICE

## 2018-02-22 RX ORDER — BACLOFEN 20 MG/1
20 TABLET ORAL 2 TIMES DAILY
COMMUNITY
End: 2020-02-24 | Stop reason: SDUPTHER

## 2018-02-22 RX ORDER — OMEPRAZOLE 20 MG/1
40 CAPSULE, DELAYED RELEASE ORAL 2 TIMES DAILY
COMMUNITY
Start: 2018-02-15 | End: 2018-09-20

## 2018-02-22 RX ORDER — ATORVASTATIN CALCIUM 80 MG/1
80 TABLET, FILM COATED ORAL
COMMUNITY
Start: 2016-09-14 | End: 2019-03-01 | Stop reason: SDUPTHER

## 2018-02-22 RX ORDER — FUROSEMIDE 20 MG/1
20 TABLET ORAL DAILY
Qty: 30 TAB | Refills: 6 | Status: SHIPPED | OUTPATIENT
Start: 2018-02-22 | End: 2019-03-01 | Stop reason: ALTCHOICE

## 2018-02-22 RX ORDER — METOPROLOL SUCCINATE 25 MG/1
12.5 TABLET, EXTENDED RELEASE ORAL DAILY
Qty: 30 TAB | Refills: 6 | Status: SHIPPED | OUTPATIENT
Start: 2018-02-22 | End: 2019-02-28 | Stop reason: SDUPTHER

## 2018-02-22 RX ORDER — VENLAFAXINE 100 MG/1
TABLET ORAL
Refills: 6 | COMMUNITY
Start: 2018-02-09 | End: 2018-06-21 | Stop reason: DRUGHIGH

## 2018-02-22 NOTE — PROGRESS NOTES
Mr. David Mills is a 38year old male with multiple medical problems. Hypertrophic left ventricular thickening:  Mr. David Mills has hypertension. For a long period of time he was not compliant with medication. His echocardiogram as mentioned above showed moderate to severe left ventricular wall thickness with some outflow tract gradient of 40 mmHg in the setting of hyperdynamic LV ejection fraction. This was most likely from hyperdynamic LV ejection fraction and chronic hypertension which was untreated. I'm going to start him on Toprol XL 12.5 mg daily. At this time he does not have any rales or JVD. He does have lower extremity swelling, which is likely from his stroke and dependency. I'm going to start him on Lasix 20 mg as needed. This was communicated with the patient and father, who agree. Hypertension:  His blood pressure is normal today, however at PCP's his blood pressure was slightly elevated. I'm going to start him on Toprol XL 12.5 mg daily as mentioned above. His blood pressure will be checked again. Hypothyroidism: This is being managed by PCP. I will defer management to PCP. Currently he is on Levothyroxine. Hyperlipidemia:  He is currently on Atorvastatin 80 mg daily. Last lipid profile showed LDL of 54. *** CVA:  Mr. David Mills had a right internal carotid artery occlusion and stroke in October, 2017. He's undergoing rehab therapy at this time. He is already on aspirin and statin.   Defer management to PCP and neurology team.

## 2018-02-22 NOTE — PROGRESS NOTES
Mr. Bessy Garrett is a 38year old male with multiple medical problems, including carotid artery occlusion with right sided CVA in 2017, hypertension, obesity, sleep apnea and multiple other medical problems. Mr. Bessy Garrett is here today to establish care with me because of abnormal echocardiogram finding. According to patient and the parent, Mr. Bessy Garrett had a *** with *** radiation when he was age 9. In October, 2017 he had a stroke causing left sided weakness. He underwent extensive physical therapy. He was seen by PCP and found to have an echocardiogram abnormality, for which he was asked to come see me. Mr. Bessy Garrett denies any prior history of myocardial infarction or congestive heart failure. He does have some lower extremity swelling since he had a stroke, especially towards the end of the day. He uses one pillow at night. He denies any chest pain or chest tightness. He denies any palpitations, presyncope or syncope.

## 2018-02-22 NOTE — PROGRESS NOTES
[facility]     Dictation on: 02/22/2018  4:44 PM by: Abelardo Massey [[id number]]   Denies any nausea, vomiting, abdominal pain, fever, chills, sputum production. 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9    Carotid artery occlusion with infarction ([facility] 10/2017    CVA (cerebral vascular accident) ([facility] 2016    Hypertension     Hypothyroid     Obesity     Thyroid disorder          Past Surgical History:   Procedure Laterality Date    HX OTHER SURGICAL  2/19/10    excision of lipoma, left temporal lesion, left face cyst, right face lesion, removal of 5 skin tags    HX TUMOR REMOVAL      tumor removed from head when 1years old       Current Outpatient Prescriptions   Medication Sig    aspirin delayed-release 81 mg tablet 81 mg.    atorvastatin (LIPITOR) 80 mg tablet 80 mg.    baclofen (LIORESAL) 20 mg tablet 20 mg.    ferrous sulfate 325 mg (65 mg iron) tablet 325 mg.    omeprazole (PRILOSEC) 20 mg capsule 40 mg.    levothyroxine (SYNTHROID) 100 mcg tablet TK ONE T PO QAM ON AN EMPTY STOMACH WITH WATER AND WAIT 30 MINUTES TO EAT    venlafaxine (EFFEXOR) 100 mg tablet TK 1 T PO QHS FOR ONE WEEK THEN 1 T BID. REPLACES CITALOPRAM    metoprolol succinate (TOPROL XL) 25 mg XL tablet Take 0.5 Tabs by mouth daily.  furosemide (LASIX) 20 mg tablet Take 1 Tab by mouth daily.  MARY. STOCKING,KNEE,REG,XLRG (COMP STOCKING, KNEE,REG, X-LRG) misc Wear daily or as needed    cyanocobalamin (VITAMIN B-12) 1,000 mcg tablet Take 1,000 mcg by mouth daily.  OTHER Testosterone PO      No current facility-administered medications for this visit.         Allergies and Sensitivities:  Allergies   Allergen Reactions    Chocolate [Cocoa] Nausea Only       Family History:  Family History Problem Relation Age of Onset    Hypertension Mother     Hypertension Father     Diabetes Father     Cancer Father      colon cancer       Social History:  Social History   Substance Use Topics    Smoking status: Never Smoker    Smokeless tobacco: Never Used    Alcohol use No     He  reports that he has never smoked. He has never used smokeless tobacco.  He  reports that he does not drink alcohol. Review of Systems:  Cardiac symptoms as noted above in HPI. All others negative. Denies fatigue, malaise, skin rash, joint pain, blurring vision, photophobia, neck pain, hemoptysis, chronic cough, nausea, vomiting, hematuria, burning micturition, BRBPR, chronic headaches. Physical Exam:  BP Readings from Last 3 Encounters:   02/22/18 117/77   01/29/18 (!) 133/91   08/23/13 (!) 144/92         Pulse Readings from Last 3 Encounters:   02/22/18 64   01/29/18 66   08/23/13 72          Wt Readings from Last 3 Encounters:   02/22/18 234 lb (106.1 kg)   01/29/18 248 lb (112.5 kg)   08/21/13 245 lb (111.1 kg)       Constitutional: Oriented to person, place, and time. HENT: Head: Normocephalic and atraumatic. Eyes: Conjunctivae and extraocular motions are normal.   Neck: No JVD present. Carotid bruit is not appreciated. Cardiovascular: Regular rhythm. No murmur, gallop or rubs appreciated  Lung: Breath sounds normal. No respiratory distress. No ronchi or rales appreciated  Abdominal: No tenderness. No rebound and no guarding. Musculoskeletal: There is +1 lower extremity edema. No cynosis  Lymphadenopathy:  No cervical or supraclavicular adenopathy appriciated. Neurological: left sided weakness  Skin: No visible skin rash noted.    No Ear discharge noted    Review of Data  LABS:   Lab Results   Component Value Date/Time    Sodium 138 08/21/2013 01:45 AM    Potassium 4.3 08/21/2013 01:45 AM    Chloride 103 08/21/2013 01:45 AM    CO2 23 08/21/2013 01:45 AM    Glucose 84 08/21/2013 01:45 AM    BUN 17 08/21/2013 01:45 AM    Creatinine 1.01 08/21/2013 01:45 AM     Lipids Latest Ref Rng & Units 2/1/2018   Chol, Total <200 MG/   HDL 40 - 60 MG/DL 39(L)   LDL 0 - 100 MG/DL 54.8   Trig <150 MG/(H)   Chol/HDL Ratio 0 - 5.0   3.5   Some recent data might be hidden     Lab Results   Component Value Date/Time    ALT (SGPT) 39 08/21/2013 01:45 AM     No results found for: HBA1C, HGBE8, SBA4ZGZJ, AXL0SFAU, VMA1OUZH    EKG    ECHO (09/17)  NORMAL HYPERDYNAMIC GLOBAL LEFT VENTRICULAR SYSTOLIC FUNCTION. EJECTION FRACTION OF 75%. MILD DIASTOLIC DYSFUNCTION. MODERATE TO SEVERE LEFT VENTRICULAR HYPERTROPHY. OUTFLOW TRACT GRADIENT OF 42MMHG, PATIENT UNABLE TO VALSALVA. NO HEMODYNAMICALLY SIGNIFICANT VALVULAR PATHOLOGY. INSUFFICIENT TRICUSPID REGURGITATION FOR RVSP. BUBBLE STUDY INADEQUTE TO RULE OUT R TO L SHUNTING. NO PREVIOUS REPORT FOR COMPARISON. IMPRESSION & PLAN:   Dictation on: 02/22/2018  4:48 PM by: Ki Gregorio [id number]     Importance of diet  was discussed with patient. This plan was discussed with patient who is in agreement. Thank you for allowing me to participate in patient care. Please feel free to call me if you have any question or concern. Joana Closs, MD  Please note: This document has been produced using voice recognition software. Unrecognized errors in transcription may be present.

## 2018-02-22 NOTE — Clinical Note
[facility] Mr. Son Cordero is a 38year old male with multiple medical problems, including carotid artery occlusion with right sided CVA in 2017, hypertension, obesity, sleep apnea and multiple other medical problems. Mr. Son Cordero is here today to establish care with me because of abnormal echocardiogram finding. According to patient and the parent, Mr. Son Cordero had a *** with *** radiation when he was age 9. In October, 2017 he had a stroke causing left sided weakness. He underwent extensive physical therapy. He was seen by PCP and found to have an echocardiogram abnormality, for which he was asked to come see me. Mr. Son Cordero denies any prior history of myocardial infarction or congestive heart failure. He does have some lower extremity swelling since he had a stroke, especially towards the end of the day. He uses one pillow at night. He denies any chest pain or chest tightness. He denies any palpitations, presyncope or syncope. Denies any nausea, vomiting, abdominal pain, fever, chills, sputum production. No hematuria or other bleeding complaints Past Medical History:  
Diagnosis Date  Blindness of left eye  Brain tumor (benign) (HCC)   
 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  
 Carotid artery occlusion with infarction ([facility] [address] 10/2017  CVA (cerebral vascular accident) ([facility] [address] [facility] 2016  Hypertension  Hypothyroid  Obesity  Thyroid disorder Past Surgical History:  
Procedure Laterality Date  HX OTHER SURGICAL  2/19/10  
 excision of lipoma, left temporal lesion, left face cyst, right face lesion, removal of 5 skin tags  HX TUMOR REMOVAL    
 tumor removed from head when 1years old Current Outpatient Prescriptions Medication Sig  
 aspirin delayed-release 81 mg tablet 81 mg.  
 atorvastatin (LIPITOR) 80 mg tablet 80 mg.  
 baclofen (LIORESAL) 20 mg tablet 20 mg.  ferrous sulfate 325 mg (65 mg iron) tablet 325 mg.  
 omeprazole (PRILOSEC) 20 mg capsule 40 mg.  
 levothyroxine (SYNTHROID) 100 mcg tablet TK ONE T PO QAM ON AN EMPTY STOMACH WITH WATER AND WAIT 30 MINUTES TO EAT  venlafaxine (EFFEXOR) 100 mg tablet TK 1 T PO QHS FOR ONE WEEK THEN 1 T BID. REPLACES CITALOPRAM  
 metoprolol succinate (TOPROL XL) 25 mg XL tablet Take 0.5 Tabs by mouth daily.  furosemide (LASIX) 20 mg tablet Take 1 Tab by mouth daily.  MARY. STOCKING,KNEE,REG,XLRG (COMP STOCKING, KNEE,REG, X-LRG) misc Wear daily or as needed  cyanocobalamin (VITAMIN B-12) 1,000 mcg tablet Take 1,000 mcg by mouth daily.  OTHER Testosterone PO No current facility-administered medications for this visit. Allergies and Sensitivities: 
Allergies Allergen Reactions  Chocolate [Cocoa] Nausea Only Family History: 
Family History Problem Relation Age of Onset  Hypertension Mother  Hypertension Father  Diabetes Father  Cancer Father   
  colon cancer Social History: 
Social History Substance Use Topics  Smoking status: Never Smoker  Smokeless tobacco: Never Used  Alcohol use No  
 
He  reports that he has never smoked. He has never used smokeless tobacco.  He  reports that he does not drink alcohol. Review of Systems: 
Cardiac symptoms as noted above in HPI. All others negative. Denies fatigue, malaise, skin rash, joint pain, blurring vision, photophobia, neck pain, hemoptysis, chronic cough, nausea, vomiting, hematuria, burning micturition, BRBPR, chronic headaches. Physical Exam: 
BP Readings from Last 3 Encounters:  
02/22/18 117/77  
01/29/18 (!) 133/91  
08/23/13 (!) 144/92 Pulse Readings from Last 3 Encounters:  
02/22/18 64  
01/29/18 66  
08/23/13 72 Wt Readings from Last 3 Encounters:  
02/22/18 234 lb (106.1 kg) 01/29/18 248 lb (112.5 kg) 08/21/13 245 lb (111.1 kg) Constitutional: Oriented to person, place, and time. HENT: Head: Normocephalic and atraumatic. Eyes: Conjunctivae and extraocular motions are normal.  
Neck: No JVD present. Carotid bruit is not appreciated. Cardiovascular: Regular rhythm. No murmur, gallop or rubs appreciated Lung: Breath sounds normal. No respiratory distress. No ronchi or rales appreciated Abdominal: No tenderness. No rebound and no guarding. Musculoskeletal: There is +1 lower extremity edema. No cynosis Lymphadenopathy:  No cervical or supraclavicular adenopathy appriciated. Neurological: left sided weakness Skin: No visible skin rash noted. No Ear discharge noted Review of Data LABS:  
Lab Results Component Value Date/Time Sodium 138 08/21/2013 01:45 AM  
 Potassium 4.3 08/21/2013 01:45 AM  
 Chloride 103 08/21/2013 01:45 AM  
 CO2 23 08/21/2013 01:45 AM  
 Glucose 84 08/21/2013 01:45 AM  
 BUN 17 08/21/2013 01:45 AM  
 Creatinine 1.01 08/21/2013 01:45 AM  
 
Lipids Latest Ref Rng & Units 2/1/2018 Chol, Total <200 MG/ HDL 40 - 60 MG/DL 39(L) LDL 0 - 100 MG/DL 54.8 Trig <150 MG/(H) Chol/HDL Ratio 0 - 5.0   3.5 Some recent data might be hidden Lab Results Component Value Date/Time ALT (SGPT) 39 08/21/2013 01:45 AM  
 
No results found for: HBA1C, HGBE8, XSF2WRDV, QDJ9JWHO, AXH3VOLV 
 
EKG 
 
ECHO (09/17) NORMAL HYPERDYNAMIC GLOBAL LEFT VENTRICULAR SYSTOLIC FUNCTION. EJECTION FRACTION OF 75%. MILD DIASTOLIC DYSFUNCTION. MODERATE TO SEVERE LEFT VENTRICULAR HYPERTROPHY. OUTFLOW TRACT GRADIENT OF 42MMHG, PATIENT UNABLE TO VALSALVA. NO HEMODYNAMICALLY SIGNIFICANT VALVULAR PATHOLOGY. INSUFFICIENT TRICUSPID REGURGITATION FOR RVSP. BUBBLE STUDY INADEQUTE TO RULE OUT R TO L SHUNTING. NO PREVIOUS REPORT FOR COMPARISON. IMPRESSION & PLAN: 
Mr. Geovanna Aquino is a 38year old male with multiple medical problems. Hypertrophic left ventricular thickening:  Mr. Geovanna Aquino has hypertension. For a long period of time he was not compliant with medication. His echocardiogram as mentioned above showed moderate to severe left ventricular wall thickness with some outflow tract gradient of 40 mmHg in the setting of hyperdynamic LV ejection fraction. This was most likely from hyperdynamic LV ejection fraction and chronic hypertension which was untreated. I'm going to start him on Toprol XL 12.5 mg daily. At this time he does not have any rales or JVD. He does have lower extremity swelling, which is likely from his stroke and dependency. I'm going to start him on Lasix 20 mg as needed. This was communicated with the patient and father, who agree. Hypertension:  His blood pressure is normal today, however at PCP's his blood pressure was slightly elevated. I'm going to start him on Toprol XL 12.5 mg daily as mentioned above. His blood pressure will be checked again. Hypothyroidism: This is being managed by PCP. I will defer management to PCP. Currently he is on Levothyroxine. Hyperlipidemia:  He is currently on Atorvastatin 80 mg daily. Last lipid profile showed LDL of 54. *** CVA:  Mr. Zeke Rajan had a right internal carotid artery occlusion and stroke in October, 2017. He's undergoing rehab therapy at this time. He is already on aspirin and statin. Defer management to PCP and neurology team. 
 
Importance of diet  was discussed with patient. This plan was discussed with patient who is in agreement. Thank you for allowing me to participate in patient care. Please feel free to call me if you have any question or concern. Alex Matta MD 
Please note: This document has been produced using voice recognition software. Unrecognized errors in transcription may be present.

## 2018-02-22 NOTE — MR AVS SNAPSHOT
[facility] [address] [id number] 
[phone] Patient: Cristina Lopez. MRN: [id number] :1975 Visit Information Date & Time Provider Department Dept. Phone Encounter #  
 2018  3:15 PM Ariel Wyatt MD [address] [place] [address] Specialist at [place] [address] [id number] Follow-up Instructions Return in about 6 months (around 2018). Your Appointments 2018 12:45 PM  
ROUTINE CARE with Remigio Wadsworth MD  
[facility] Appt Note: Return in about 1 month (around 2018). [id number] [facility] [address]  
  
   
 [id number] [place] [address]  
  
    
 3/26/2018  2:00 PM  
Follow Up with Evonne Keyes PA-C Cardio Specialist at [facility]) Appt Note: one month [facility] [address] [address]  
  
    
 2018  2:45 PM  
Follow Up with Allen Black MD  
Cardio Specialist at [facility]) Appt Note: 6 months [facility] [address] [address] Upcoming Health Maintenance Date Due DTaP/Tdap/Td series (1 - Tdap) 1994 Allergies as of 2018  Review Complete On: 2018 By: Hong Velasco RN Severity Noted Reaction Type Reactions Chocolate [Cocoa]  2017    Nausea Only Current Immunizations  Never Reviewed Name Date Pneumococcal Polysaccharide (PPSV-23) 2013 11:28 AM  
  
 Not reviewed this visit Vitals BP Pulse Height(growth percentile) Weight(growth percentile) SpO2 BMI  
 117/77 64 5' 4\" (1.626 m) 234 lb (106.1 kg) 99% 40.17 kg/m2 Smoking Status Never Smoker Vitals History BMI and BSA Data Body Mass Index Body Surface Area  
 40.17 kg/m 2 2.19 m 2 Preferred Pharmacy Pharmacy Name Phone [facility] [place], [address] [place] [address] [phone] Your Updated Medication List  
  
   
This list is accurate as of 2/22/18  3:52 PM.  Always use your most recent med list.  
  
  
  
  
 aspirin delayed-release 81 mg tablet 81 mg.  
  
 atorvastatin 80 mg tablet Commonly known as:  LIPITOR 80 mg.  
  
 baclofen 20 mg tablet Commonly known as:  LIORESAL  
20 mg.  
  
 ferrous sulfate 325 mg (65 mg iron) tablet 325 mg.  
  
 levothyroxine 100 mcg tablet Commonly known as:  SYNTHROID TK ONE T PO QAM ON AN EMPTY STOMACH WITH WATER AND WAIT 30 MINUTES TO EAT  
  
 omeprazole 20 mg capsule Commonly known as:  PRILOSEC 40 mg.  
  
 OTHER Testosterone PO  
  
 venlafaxine 100 mg tablet Commonly known as:  [facility] TK 1 T PO QHS FOR ONE WEEK THEN 1 T BID. REPLACES CITALOPRAM  
  
 VITAMIN B-12 1,000 mcg tablet Generic drug:  cyanocobalamin Take 1,000 mcg by mouth daily. Follow-up Instructions Return in about 6 months (around 8/22/2018). To-Do List   
 02/26/2018 1:00 PM  
  Appointment with Mary Porter PT at [facility]  ([phone])  
  
 02/28/2018 1:00 PM  
  Appointment with Mary Porter PT at [facility] IM ([phone]) Patient Instructions Stop Toprol XL 12.5mg Daily Lasix 20mg daily Introducing [facility]! 3 [facility] introduces [employer] patient portal. Now you can access parts of your medical record, email your doctor's office, and request medication refills online. 1. In your internet browser, go to [email or web address] 2. Click on the First Time User? Click Here link in the Sign In box. You will see the New Member Sign Up page. 3. Enter your netomat Access Code exactly as it appears below. You will not need to use this code after youve completed the sign-up process. If you do not sign up before the expiration date, you must request a new code. · netomat Access Code: [email or web address] Expires: 3/4/2018 10:09 AM 
 
4. Enter the last four digits of your Social Security Number (xxxx) and Date of Birth (mm/dd/yyyy) as indicated and click Submit. You will be taken to the next sign-up page. 5. Create a netomat ID. This will be your netomat login ID and cannot be changed, so think of one that is secure and easy to remember. 6. Create a netomat password. You can change your password at any time. 7. Enter your Password Reset Question and Answer. This can be used at a later time if you forget your password. 8. Enter your e-mail address. You will receive e-mail notification when new information is available in [address] 9. Click Sign Up. You can now view and download portions of your medical record. 10. Click the Download Summary menu link to download a portable copy of your medical information. If you have questions, please visit the Frequently Asked Questions section of the netomat website. Remember, netomat is NOT to be used for urgent needs. For medical emergencies, dial 911. Now available from your iPhone and Android! Please provide this summary of care documentation to your next provider. Your primary care clinician is listed as [phone]. If you have any questions after today's visit, please call [phone].

## 2018-02-23 ENCOUNTER — APPOINTMENT (OUTPATIENT)
Dept: PHYSICAL THERAPY | Age: 43
End: 2018-02-23
Payer: MEDICAID

## 2018-02-26 ENCOUNTER — HOSPITAL ENCOUNTER (OUTPATIENT)
Dept: PHYSICAL THERAPY | Age: 43
Discharge: HOME | End: 2018-02-26
Payer: MEDICAID

## 2018-02-26 PROCEDURE — 97530 THERAPEUTIC ACTIVITIES: CPT

## 2018-02-26 NOTE — PROGRESS NOTES
PHYSICAL THERAPY - DAILY TREATMENT NOTE    Patient Name: Todd Iverson. Date: 2018  : 1975   YES Patient  Verified  Visit #:     Insurance: Payor: [address] [place] [address] / Plan:     / Product Type: Medicaid /      In time: 1:00 Out time: 1:30   Total Treatment Time: 30     Medicare Time Tracking (below)   Total Timed Codes (min):  NA 1:1 Treatment Time:  NA     TREATMENT AREA =  Gait instability [R26.81]  Stroke (cerebrum) (HCC) [I63.9]    SUBJECTIVE    Pain Level (on 0 to 10 scale):  0  / 10   Medication Changes/New allergies or changes in medical history, any new surgeries or procedures? NO    If yes, update Summary List   Subjective Functional Status/Changes:  []  No changes reported     Patient's father reports the lock on his KAFO was adjusted this morning. OBJECTIVE    30 min Therapeutic Activity:  [x]  See flow sheet   Rationale:      increase ROM, increase strength, improve coordination, improve balance and increase proprioception to improve the patients ability to perform ADL's, gait, and functional mobility with increased safety and independence. min Patient Education:  YES  Reviewed HEP   []  Progressed/Changed HEP based on:   Cont HEP     Other Objective/Functional Measures:    Patient demonstrated ability to lock KAFO independently upon standing 100% of the time today. Patient demonstrated ability to unlock KAFO independently when transferring from stand to sit. Used mirror to facilitate weight bearing on left LE (wiht KAFO locked). Discussed using KAFO for household ambulation. Discussed DC next visit if able to continue to lock/unlock KAFO independently. Post Treatment Pain Level (on 0 to 10) scale:   0  / 10     ASSESSMENT    Assessment/Changes in Function:     Patient able to lock/unlock KAFO independently today.      []  See Progress Note/Recertification   Patient will continue to benefit from skilled PT services to modify and progress therapeutic interventions, address functional mobility deficits, address ROM deficits, address strength deficits, analyze and address soft tissue restrictions, analyze and cue movement patterns, analyze and modify body mechanics/ergonomics, assess and modify postural abnormalities, address imbalance/dizziness and instruct in home and community integration to attain remaining goals. Progress toward goals / Updated goals:    New Goals to be achieved in __2-4__  weeks:  1.  Patient will demonstrate independence with HEP. Cont HEP   2.  Patient will demonstrate 4 point improvement on Tinetti (15/28) to indicate improved balance/functional mobility/gait. Cont ambulation with RW and left KAFO   3.  Patient will demonstrate ability to transfer sit to stand/stand to sit and lock/unlock L KAFO with S in preparation for ambulation. Patient able to lock/unlock KAFO independently today. PLAN    [x]  Upgrade activities as tolerated YES Continue plan of care   []  Discharge due to :    []  Other:      Therapist: Wade Sexton PT    Date: 2/26/2018 Time: 1:10 PM     Future Appointments  Date Time Provider Nanda Manueli   2/27/2018 12:45 Uriel Tavarez MD [facility]   2/28/2018 1:00 PM Wade Sexton, PT [facility]   3/26/2018 2:00 PM Nicholas Calloway.  Eitan Hoyt [address] [place] [address]   8/23/2018 2:45 PM Ariel Upton MD [address] [place] [address]

## 2018-02-27 ENCOUNTER — OFFICE VISIT (OUTPATIENT)
Dept: FAMILY MEDICINE CLINIC | Age: 43
End: 2018-02-27

## 2018-02-27 ENCOUNTER — TELEPHONE (OUTPATIENT)
Dept: FAMILY MEDICINE CLINIC | Age: 43
End: 2018-02-27

## 2018-02-27 VITALS
HEART RATE: 69 BPM | WEIGHT: 232 LBS | BODY MASS INDEX: 39.61 KG/M2 | SYSTOLIC BLOOD PRESSURE: 125 MMHG | HEIGHT: 64 IN | RESPIRATION RATE: 14 BRPM | TEMPERATURE: 98 F | OXYGEN SATURATION: 100 % | DIASTOLIC BLOOD PRESSURE: 71 MMHG

## 2018-02-27 DIAGNOSIS — I66.01 STENOSIS OF RIGHT MIDDLE CEREBRAL ARTERY: ICD-10-CM

## 2018-02-27 DIAGNOSIS — I51.7 LVH (LEFT VENTRICULAR HYPERTROPHY): ICD-10-CM

## 2018-02-27 DIAGNOSIS — I10 ESSENTIAL HYPERTENSION: Primary | Chronic | ICD-10-CM

## 2018-02-27 DIAGNOSIS — E03.4 HYPOTHYROIDISM DUE TO ACQUIRED ATROPHY OF THYROID: Chronic | ICD-10-CM

## 2018-02-27 DIAGNOSIS — K29.00 ACUTE SUPERFICIAL GASTRITIS WITHOUT HEMORRHAGE: ICD-10-CM

## 2018-02-27 LAB — HGB BLD-MCNC: 8.3 G/DL

## 2018-02-27 RX ORDER — LEVOTHYROXINE SODIUM 112 UG/1
112 TABLET ORAL
Qty: 90 TAB | Refills: 1 | Status: SHIPPED | OUTPATIENT
Start: 2018-02-27 | End: 2018-10-08 | Stop reason: SDUPTHER

## 2018-02-27 NOTE — MR AVS SNAPSHOT
[address] [facility] 
 
 
 [address] [facility] [place]note 2] [address] [facility] 83 [address] 
[phone] Patient: Bulmaro Desouza. MRN: [id number] :1975 Visit Information Date & Time Provider Department Dept. Phone Encounter #  
 2018 12:45 PM [id number], [address] [facility] [phone] Follow-up Instructions Return in about 3 months (around 2018). Your Appointments 3/26/2018  2:00 PM  
Follow Up with Bowen Gimenez PA-C Cardio Specialist at [facility]) Appt Note: one month [facility] [address] [place] [address]  
  
    
 2018  2:45 PM  
Follow Up with Pierre Martinez MD  
Cardio Specialist at [facility]) Appt Note: 6 months [place] [address] [facility] [address] [place] [address] Upcoming Health Maintenance Date Due DTaP/Tdap/Td series (1 - Tdap) 1994 Allergies as of 2018  Review Complete On: 2018 By: Hugo Lemus LPN Severity Noted Reaction Type Reactions Chocolate [Cocoa]  2017    Nausea Only Current Immunizations  Never Reviewed Name Date Pneumococcal Polysaccharide (PPSV-23) 2013 11:28 AM  
  
 Not reviewed this visit You Were Diagnosed With   
  
 Codes Comments Essential hypertension    -  Primary ICD-10-CM: I10 
ICD-9-CM: 401.9 Hypothyroidism due to acquired atrophy of thyroid     ICD-10-CM: E03.4 ICD-9-CM: 244.8, 246.8 Acute superficial gastritis without hemorrhage     ICD-10-CM: K29.00 ICD-9-CM: 535.40 LVH (left ventricular hypertrophy)     ICD-10-CM: I51.7 ICD-9-CM: 429.3 Stenosis of right middle cerebral artery     ICD-10-CM: I66.01 
ICD-9-CM: 437.0 Vitals BP Pulse Temp Resp Height(growth percentile) Weight(growth percentile) 125/71 69 98 °F (36.7 °C) (Oral) 14 5' 4\" (1.626 m) 232 lb (105.2 kg) SpO2 BMI Smoking Status 100% 39.82 kg/m2 Never Smoker BMI and BSA Data Body Mass Index Body Surface Area  
 39.82 kg/m 2 2.18 m 2 Preferred Pharmacy Pharmacy Name Phone [facility] [address] [phone] Your Updated Medication List  
  
   
This list is accurate as of 2/27/18  1:33 PM.  Always use your most recent med list.  
  
  
  
  
 aspirin delayed-release 81 mg tablet 81 mg.  
  
 atorvastatin 80 mg tablet Commonly known as:  LIPITOR 80 mg.  
  
 baclofen 20 mg tablet Commonly known as:  LIORESAL  
20 mg. Comp Stocking, Knee,Reg, X-Lrg Misc Wear daily or as needed  
  
 ferrous sulfate 325 mg (65 mg iron) tablet 325 mg.  
  
 furosemide 20 mg tablet Commonly known as:  LASIX Take 1 Tab by mouth daily. levothyroxine 112 mcg tablet Commonly known as:  SYNTHROID Take 1 Tab by mouth Daily (before breakfast). metoprolol succinate 25 mg XL tablet Commonly known as:  TOPROL XL Take 0.5 Tabs by mouth daily. omeprazole 20 mg capsule Commonly known as:  PRILOSEC 40 mg two (2) times a day. OTHER Testosterone PO  
  
 venlafaxine 100 mg tablet Commonly known as:  [facility] TK 1 T PO QHS FOR ONE WEEK THEN 1 T BID. REPLACES CITALOPRAM  
  
 VITAMIN B-12 1,000 mcg tablet Generic drug:  cyanocobalamin Take 1,000 mcg by mouth daily. Prescriptions Sent to Pharmacy Refills  
 levothyroxine (SYNTHROID) 112 mcg tablet 1 Sig: Take 1 Tab by mouth Daily (before breakfast). Class: Normal  
 Pharmacy: [facility] [address] Ph #: [phone] Route: Oral  
  
Follow-up Instructions Return in about 3 months (around 5/27/2018). To-Do List   
 02/28/2018 1:00 PM  
  Appointment with Nita Jimenez, PT at [facility] PT [address] IM ([phone]) Introducing [facility] SERVICES! [employer] introduces Amprius patient portal. Now you can access parts of your medical record, email your doctor's office, and request medication refills online. 1. In your internet browser, go to [email or web address] 2. Click on the First Time User? Click Here link in the Sign In box. You will see the New Member Sign Up page. 3. Enter your Amprius Access Code exactly as it appears below. You will not need to use this code after youve completed the sign-up process. If you do not sign up before the expiration date, you must request a new code. · Amprius Access Code: [email or web address] Expires: 3/4/2018 10:09 AM 
 
4. Enter the last four digits of your Social Security Number (xxxx) and Date of Birth (mm/dd/yyyy) as indicated and click Submit. You will be taken to the next sign-up page. 5. Create a Amprius ID. This will be your Amprius login ID and cannot be changed, so think of one that is secure and easy to remember. 6. Create a Amprius password. You can change your password at any time. 7. Enter your Password Reset Question and Answer. This can be used at a later time if you forget your password. 8. Enter your e-mail address. You will receive e-mail notification when new information is available in [address] 9. Click Sign Up. You can now view and download portions of your medical record. 10. Click the Download Summary menu link to download a portable copy of your medical information. If you have questions, please visit the Frequently Asked Questions section of the Amprius website. Remember, Amprius is NOT to be used for urgent needs. For medical emergencies, dial 911. Now available from your iPhone and Android! Please provide this summary of care documentation to your next provider. Your primary care clinician is listed as Gato Monae. If you have any questions after today's visit, please call [phone].

## 2018-02-27 NOTE — PROGRESS NOTES
1. Have you been to the ER, urgent care clinic since your last visit? Hospitalized since your last visit? 2/11/18 Tomás    2. Have you seen or consulted any other health care providers outside of the [address] [place] [address] since your last visit? Include any pap smears or colon screening.  No

## 2018-02-27 NOTE — TELEPHONE ENCOUNTER
Mother called to let us know Day rehab for son is faxing an order which needs to be filled out and faxed back at [phone].

## 2018-02-27 NOTE — PROGRESS NOTES
[address] [place] [address]. is a 38 y.o.  male and presents with     Chief Complaint   Patient presents with    Hypertension    Cholesterol Problem    Hypothyroidism    Extremity Weakness    Blindness    Leg Swelling       Pt saw cardiology and was started on low dose lasix. Pt was also started on toprol for LVH . Pt recently was admitted to [facility] for upper GI bleed. Apparentyl he hwas taking ibuprofen for toothache. Pt had EGD and colonoscopy that relveaed gastritis, erosion , polyp and small internal hemorrhoid. Pt received 2 units of PRBCs. Pt has stopped taking Ibuprofen and also Plavix due to GI bleed. Pt feels fine today. He still has some dark stools . 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9    Carotid artery occlusion with infarction ([facility] [address] 10/2017    CVA (cerebral vascular accident) ([facility] [address] 2016    Hypertension     Hypothyroid     Obesity     Thyroid disorder      Past Surgical History:   Procedure Laterality Date    HX OTHER SURGICAL  2/19/10    excision of lipoma, left temporal lesion, left face cyst, right face lesion, removal of 5 skin tags    HX TUMOR REMOVAL      tumor removed from head when 1years old     Current Outpatient Prescriptions   Medication Sig    levothyroxine (SYNTHROID) 112 mcg tablet Take 1 Tab by mouth Daily (before breakfast).  aspirin delayed-release 81 mg tablet 81 mg.    atorvastatin (LIPITOR) 80 mg tablet 80 mg.    baclofen (LIORESAL) 20 mg tablet 20 mg.    ferrous sulfate 325 mg (65 mg iron) tablet 325 mg.    omeprazole (PRILOSEC) 20 mg capsule 40 mg two (2) times a day.  venlafaxine (EFFEXOR) 100 mg tablet TK 1 T PO QHS FOR ONE WEEK THEN 1 T BID. REPLACES CITALOPRAM    metoprolol succinate (TOPROL XL) 25 mg XL tablet Take 0.5 Tabs by mouth daily.  furosemide (LASIX) 20 mg tablet Take 1 Tab by mouth daily.     Bryan Velasquez. STOCKING,KNEE,REG,XLRG (COMP STOCKING, KNEE,REG, X-LRG) misc Wear daily or as needed    cyanocobalamin (VITAMIN B-12) 1,000 mcg tablet Take 1,000 mcg by mouth daily.  OTHER Testosterone PO      No current facility-administered medications for this visit. Health Maintenance   Topic Date Due    DTaP/Tdap/Td series (1 - Tdap) 11/27/1994    Influenza Age 5 to Adult  Completed     Immunization History   Administered Date(s) Administered    Pneumococcal Polysaccharide (PPSV-23) 08/23/2013     No LMP for male patient. Allergies and Intolerances: Allergies   Allergen Reactions    Chocolate [Cocoa] Nausea Only       Family History:   Family History   Problem Relation Age of Onset    Hypertension Mother     Hypertension Father     Diabetes Father     Cancer Father      colon cancer       Social History:   He  reports that he has never smoked. He has never used smokeless tobacco.  He  reports that he does not drink alcohol.             Review of Systems: pos for dark stools,pos for fatigue, pos for sleepiness  General: negative for - chills, fatigue, fever, weight change  Psych: negative for - anxiety, depression, irritability or mood swings  ENT: negative for - headaches, hearing change, nasal congestion, oral lesions, sneezing or sore throat  Heme/ Lymph: negative for - bleeding problems, bruising, pallor or swollen lymph nodes  Endo: negative for - hot flashes, polydipsia/polyuria or temperature intolerance  Resp: negative for - cough, shortness of breath or wheezing  CV: negative for - chest pain, edema or palpitations  GI: negative for - abdominal pain, change in bowel habits, constipation, diarrhea or nausea/vomiting  : negative for - dysuria, hematuria, incontinence, pelvic pain or vulvar/vaginal symptoms  MSK: negative for - joint pain, joint swelling or muscle pain  Neuro: negative for - confusion, headaches, seizures, pos for left sided weakness  Derm: negative for - dry skin, hair changes, rash or skin lesion changes          Physical:   Vitals:   Vitals:    02/27/18 1254   BP: 125/71   Pulse: 69   Resp: 14   Temp: 98 °F (36.7 °C)   TempSrc: Oral   SpO2: 100%   Weight: 232 lb (105.2 kg)   Height: 5' 4\" (1.626 m)           Exam:   HEENT- atraumatic,normocephalic, awake, oriented, well nourished,mild pallor  Neck - supple,no enlarged lymph nodes, no JVD, no thyromegal  Chest- CTA, no rhonchi, no crackles  Heart- rrr, no murmurs / gallop/rub  Abdomen- soft,BS+,NT, no hepatosplenomegaly  Ext - left lower ext edema  Neuro- left sided weakness  Skin - warm,dry, no obvious rashes. Review of Data:   LABS:   Lab Results   Component Value Date/Time    WBC 6.7 02/01/2018 03:27 PM    HGB 11.1 (L) 02/01/2018 03:27 PM    HCT 34.9 (L) 02/01/2018 03:27 PM    PLATELET 648 35/74/3352 03:27 PM     Lab Results   Component Value Date/Time    Sodium 138 08/21/2013 01:45 AM    Potassium 4.3 08/21/2013 01:45 AM    Chloride 103 08/21/2013 01:45 AM    CO2 23 08/21/2013 01:45 AM    Glucose 84 08/21/2013 01:45 AM    BUN 17 08/21/2013 01:45 AM    Creatinine 1.01 08/21/2013 01:45 AM     Lab Results   Component Value Date/Time    Cholesterol, total 136 02/01/2018 03:27 PM    HDL Cholesterol 39 (L) 02/01/2018 03:27 PM    LDL, calculated 54.8 02/01/2018 03:27 PM    Triglyceride 211 (H) 02/01/2018 03:27 PM     No results found for: GPT        Impression / Plan:        ICD-10-CM ICD-9-CM    1. Essential hypertension I10 401.9 AMB POC HEMOGLOBIN (HGB)   2. Hypothyroidism due to acquired atrophy of thyroid E03.4 244.8 levothyroxine (SYNTHROID) 112 mcg tablet     246.8 AMB POC HEMOGLOBIN (HGB)   3. Acute superficial gastritis without hemorrhage K29.00 535.40 AMB POC HEMOGLOBIN (HGB)   4.  LVH (left ventricular hypertrophy) I51.7 429.3 AMB POC HEMOGLOBIN (HGB)   5. Stenosis of right middle cerebral artery I66.01 437.0 AMB POC HEMOGLOBIN (HGB)     Recent GI bleed - off Plavix and Ibupfrofen, has follow up appt with GI for repeat ultrasound guided EGD on April 6 at [facility]. Hematocrit stable    Fatigue , excessivve sleepiness - multifactorial - anermia , stroke with reisidual weakness , ? MAURICIO - May need CPAP for snoring. Explained to patient risk benefits of the medications. Advised patient to stop meds if having any side effects. Pt verbalized understanding of the instructions. I have discussed the diagnosis with the patient and the intended plan as seen in the above orders. The patient has received an after-visit summary and questions were answered concerning future plans. I have discussed medication side effects and warnings with the patient as well. I have reviewed the plan of care with the patient, accepted their input and they are in agreement with the treatment goals. Reviewed plan of care. Patient has provided input and agrees with goals. Follow-up Disposition:  Return in about 3 months (around 5/27/2018).     [address]iz MD

## 2018-02-28 ENCOUNTER — APPOINTMENT (OUTPATIENT)
Dept: PHYSICAL THERAPY | Age: 43
End: 2018-02-28
Payer: MEDICAID

## 2018-02-28 ENCOUNTER — TELEPHONE (OUTPATIENT)
Dept: FAMILY MEDICINE CLINIC | Age: 43
End: 2018-02-28

## 2018-03-01 NOTE — PROGRESS NOTES
Maryanne Ring [address]  [facility] PHYSICAL THERAPY  [address] [facility] [address], [address] - Phone: [phone]  Fax: [phone] SUMMARY  Patient Name: Errol Dejesus : 1975   Treatment/Medical Diagnosis: Gait instability [R26.81]  Stroke (cerebrum) [facility]) [I63.9]   Referral Source: Mickie Baca DO     Date of Initial Visit: 2017 Attended Visits: 14 Missed Visits: 1     SUMMARY OF TREATMENT  Treatment has consisted of functional mobility and gait training with KAFO on L LE, balance training, patient/caregiver education, and home exercise program.  CURRENT STATUS  Patient recently had his KAFO adjusted by orthotist.  He returned to therapy following adjustment and demonstrated ability to lock and unlock left KAFO independently 100% of the time. He continues to demonstrate decreased weight bearing on the left LE in stance and with ambulation (placing increased weight on right UE/hemiwalker). Patient did not return for his final PT visit due to illness; therefore, objective data is limited. Goal/Measure of Progress Goal Met? 1. Patient will demonstrate independence with HEP. Status at last Eval: Compliant with HEP Current Status: independent with HEP yes   2. Patient will demonstrate 4 point improvement on Tinetti (15/28) to indicate improved balance/functional mobility/gait. Status at last Eval:  Current Status: NT n/a   3. Patient will demonstrate ability to transfer sit to stand/stand to sit and lock/unlock L KAFO with S in preparation for ambulation. Status at last Eval: Sit <> with supervision but requires min/mod A with locking KAFO Current Status: Supervision with sit <> stand transfers with patient able to independently lock/unlock left KAFO yes     G-Codes: NA  RECOMMENDATIONS  Discontinue therapy. Progressing towards or have reached established goals.     If you have any questions/comments please contact us directly at [phone]. Thank you for allowing us to assist in the care of your patient.     Therapist Signature: Jv Boone, PT Date: 3/1/2018     Time: 1:38 PM

## 2018-03-02 NOTE — TELEPHONE ENCOUNTER
Form received. Left detailed message advising that form received and once signed, it will be faxed to [facility] day program. This encounter will be closed.

## 2018-03-07 ENCOUNTER — TELEPHONE (OUTPATIENT)
Dept: FAMILY MEDICINE CLINIC | Age: 43
End: 2018-03-07

## 2018-03-07 NOTE — TELEPHONE ENCOUNTER
Patients Mother is checking the status of the form that was faxed from [facility] last week. Asking to be called back and leave a voicemail if she don't answer.

## 2018-03-27 ENCOUNTER — OFFICE VISIT (OUTPATIENT)
Dept: CARDIOLOGY CLINIC | Age: 43
End: 2018-03-27

## 2018-03-27 VITALS
SYSTOLIC BLOOD PRESSURE: 134 MMHG | WEIGHT: 234 LBS | BODY MASS INDEX: 39.95 KG/M2 | OXYGEN SATURATION: 99 % | DIASTOLIC BLOOD PRESSURE: 78 MMHG | HEART RATE: 71 BPM | HEIGHT: 64 IN

## 2018-03-27 DIAGNOSIS — I10 ESSENTIAL HYPERTENSION: Primary | ICD-10-CM

## 2018-03-27 NOTE — PROGRESS NOTES
Cardi[facility]ists  Mr. Reno Stanford is a 38year old male with multiple medical problems, including carotid artery occlusion with right sided CVA in 2016, hypertension, obesity, GI bleed,  sleep apnea and multiple other medical problems.     Mr. Reno Stanford is here today in follow up after seeing Dr. Nba Jorgensen 02/2018. At that time, Toprol was started due to echocardiogram showing hypertrophic LV thickening. He has not had any problems since starting Toprol. No side effects from medication such as lightheadedness or dizziness. He was also given Lasix to use PRN for edema, and he has not had to use any Lasix since this prescription was filled. Denies orthopnea and he sleeps on one pillow at night. No chest pain, dyspnea on exertion, syncope or PND. He participates in rehab after post CVA, and has not had any difficulties in tolerating this regimen. Denies any nausea, vomiting, abdominal pain, fever, chills, sputum production. 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 [address] 10/2017    CVA (cerebral vascular accident) ([facility] [address] 2016    Hypertension     Hypothyroid     Obesity     Thyroid disorder          Past Surgical History:   Procedure Laterality Date    HX OTHER SURGICAL  2/19/10    excision of lipoma, left temporal lesion, left face cyst, right face lesion, removal of 5 skin tags    HX TUMOR REMOVAL      tumor removed from head when 1years old       Current Outpatient Prescriptions   Medication Sig    levothyroxine (SYNTHROID) 112 mcg tablet Take 1 Tab by mouth Daily (before breakfast).     aspirin delayed-release 81 mg tablet 81 mg.    atorvastatin (LIPITOR) 80 mg tablet 80 mg.    baclofen (LIORESAL) 20 mg tablet 20 mg.    ferrous sulfate 325 mg (65 mg iron) tablet 325 mg.    omeprazole (PRILOSEC) 20 mg capsule 40 mg two (2) times a day.  venlafaxine (EFFEXOR) 100 mg tablet TK 1 T PO QHS FOR ONE WEEK THEN 1 T BID. REPLACES CITALOPRAM    metoprolol succinate (TOPROL XL) 25 mg XL tablet Take 0.5 Tabs by mouth daily.  furosemide (LASIX) 20 mg tablet Take 1 Tab by mouth daily.  MARY. STOCKING,KNEE,REG,XLRG (COMP STOCKING, KNEE,REG, X-LRG) misc Wear daily or as needed    cyanocobalamin (VITAMIN B-12) 1,000 mcg tablet Take 1,000 mcg by mouth daily.  OTHER Testosterone PO      No current facility-administered medications for this visit. Allergies and Sensitivities:  Allergies   Allergen Reactions    Chocolate [Cocoa] Nausea Only       Family History:  Family History   Problem Relation Age of Onset    Hypertension Mother     Hypertension Father     Diabetes Father     Cancer Father      colon cancer       Social History:  Social History   Substance Use Topics    Smoking status: Never Smoker    Smokeless tobacco: Never Used    Alcohol use No     He  reports that he has never smoked. He has never used smokeless tobacco.  He  reports that he does not drink alcohol. Review of Systems:  Cardiac symptoms as noted above in HPI. All others negative. Denies fatigue, malaise, skin rash, joint pain, blurring vision, photophobia, neck pain, hemoptysis, chronic cough, nausea, vomiting, hematuria, burning micturition, BRBPR, chronic headaches. Physical Exam:  BP Readings from Last 3 Encounters:   02/27/18 125/71   02/22/18 117/77   01/29/18 (!) 133/91         Pulse Readings from Last 3 Encounters:   02/27/18 69   02/22/18 64   01/29/18 66          Wt Readings from Last 3 Encounters:   02/27/18 232 lb (105.2 kg)   02/22/18 234 lb (106.1 kg)   01/29/18 248 lb (112.5 kg)       Constitutional: Oriented to person, place, and time. HENT: Head: Normocephalic and atraumatic.  Eyes: Conjunctivae and extraocular motions are normal.   Neck: No JVD present. Carotid bruit is not appreciated. Cardiovascular: Regular rhythm. No murmur, gallop or rubs appreciated  Lung: Breath sounds normal. No respiratory distress. No ronchi or rales appreciated  Abdominal: No tenderness. No rebound and no guarding. Musculoskeletal: There is +1 lower extremity edema. No cynosis  Lymphadenopathy:  No cervical or supraclavicular adenopathy appriciated. Neurological: left sided weakness  Skin: No visible skin rash noted. No Ear discharge noted    Review of Data  LABS:   Lab Results   Component Value Date/Time    Sodium 138 08/21/2013 01:45 AM    Potassium 4.3 08/21/2013 01:45 AM    Chloride 103 08/21/2013 01:45 AM    CO2 23 08/21/2013 01:45 AM    Glucose 84 08/21/2013 01:45 AM    BUN 17 08/21/2013 01:45 AM    Creatinine 1.01 08/21/2013 01:45 AM     Lipids Latest Ref Rng & Units 2/1/2018   Chol, Total <200 MG/   HDL 40 - 60 MG/DL 39(L)   LDL 0 - 100 MG/DL 54.8   Trig <150 MG/(H)   Chol/HDL Ratio 0 - 5.0   3.5   Some recent data might be hidden     Lab Results   Component Value Date/Time    ALT (SGPT) 39 08/21/2013 01:45 AM     No results found for: HBA1C, HGBE8, MFA4PPJS, XVJ1VPXZ, TTA1YHQQ    EKG    ECHO (09/17)  NORMAL HYPERDYNAMIC GLOBAL LEFT VENTRICULAR SYSTOLIC FUNCTION. EJECTION FRACTION OF 75%. MILD DIASTOLIC DYSFUNCTION. MODERATE TO SEVERE LEFT VENTRICULAR HYPERTROPHY. OUTFLOW TRACT GRADIENT OF 42MMHG, PATIENT UNABLE TO VALSALVA. NO HEMODYNAMICALLY SIGNIFICANT VALVULAR PATHOLOGY. INSUFFICIENT TRICUSPID REGURGITATION FOR RVSP. BUBBLE STUDY INADEQUTE TO RULE OUT R TO L SHUNTING. NO PREVIOUS REPORT FOR COMPARISON. IMPRESSION & PLAN:    Hypertrophic left ventricular thickening: This was most likely from hyperdynamic LV ejection fraction and chronic hypertension which was untreated. He was started on Toprol 12.5 mg daily at his last office visit. He is tolerating this medication without side effects.  Continue same.     Hypertension:  His blood pressure is controlled on Toprol 12.5 mg daily. /78 in office today.     Hypothyroidism:  Defer management to PCP, he is currently on Synthroid.      Hyperlipidemia:  He is currently on Atorvastatin 80 mg daily. Last lipid profile showed LDL of 54 in February, 2018. Continue high intensity statin.      History of CVA:  Mr. Zeke Rajan had a prior RMCA CVA and is participating in rehab per neurology recommendations and is tolerating this without difficulty. He was on ASA and Plavix, and Plavix was held due to GI bleed. Continue regimen per respective consultants and await further GI evaluation. He can follow up in 6 mos, or sooner as symptoms dictate. Rosa Mac PA-C    Importance of diet  was discussed with patient.

## 2018-03-27 NOTE — PROGRESS NOTES
1. Have you been to the ER, urgent care clinic since your last visit? Hospitalized since your last visit? No    2. Have you seen or consulted any other health care providers outside of the [address] [place] [address] since your last visit? Include any pap smears or colon screening.  No

## 2018-04-23 ENCOUNTER — OFFICE VISIT (OUTPATIENT)
Dept: FAMILY MEDICINE CLINIC | Age: 43
End: 2018-04-23

## 2018-04-23 VITALS
OXYGEN SATURATION: 100 % | DIASTOLIC BLOOD PRESSURE: 97 MMHG | BODY MASS INDEX: 38.24 KG/M2 | HEART RATE: 66 BPM | WEIGHT: 224 LBS | TEMPERATURE: 98.7 F | SYSTOLIC BLOOD PRESSURE: 144 MMHG | RESPIRATION RATE: 18 BRPM | HEIGHT: 64 IN

## 2018-04-23 DIAGNOSIS — E03.4 HYPOTHYROIDISM DUE TO ACQUIRED ATROPHY OF THYROID: Primary | Chronic | ICD-10-CM

## 2018-04-23 DIAGNOSIS — I10 ESSENTIAL HYPERTENSION: Chronic | ICD-10-CM

## 2018-04-23 DIAGNOSIS — I51.7 LVH (LEFT VENTRICULAR HYPERTROPHY): ICD-10-CM

## 2018-04-23 LAB — HGB BLD-MCNC: 10.2 G/DL

## 2018-04-23 RX ORDER — TIZANIDINE 4 MG/1
4 TABLET ORAL 2 TIMES DAILY
COMMUNITY
End: 2018-09-25

## 2018-04-23 NOTE — LETTER
4/23/2018 4:27 PM 
 
Mr. Smith TAO [facility] [phone] [phone] To Whom It May Concern: 
 
[address] [facility] [address] Re[place] Don. is currently under the care of [address] [place] [address]. Pt is stable to resume  Occupational and Physical therapy. If there are questions or concerns please have the patient contact our office.  
 
 
 
Sincerely, 
 
 
George Serrano MD

## 2018-04-23 NOTE — MR AVS SNAPSHOT
[facility] 
 
 
 [id number] [facility] [address] [place] [id number] 
[phone] Patient: Sebastian Garcia. MRN: [id number] :1975 Visit Information Date & Time Provider Department Dept. Phone Encounter #  
 2018  3:30 PM Eusebio Lopez, [address] [place] [address] [phone] Follow-up Instructions Return in about 2 months (around 2018). Follow-up and Disposition History Your Appointments 2018 11:15 AM  
Follow Up with Eusebio Lopez MD  
[facility] [address] [place] [address]) Appt Note: Return in about 3 months (around 2018). [id number] [facility] [address] 83 222 [address] 133  
  
    
 10/4/2018 11:30 AM  
Follow Up with Tara Stafford MD  
Cardio Specialist at [address]) Appt Note: 6 months [address] [place] [address] [address] [place] [address] 133 Upcoming Health Maintenance Date Due DTaP/Tdap/Td series (1 - Tdap) 1994 Allergies as of 2018  Review Complete On: 2018 By: Eusebio Lopez MD  
  
 Severity Noted Reaction Type Reactions Chocolate [Cocoa]  2017    Nausea Only Current Immunizations  Never Reviewed Name Date Pneumococcal Polysaccharide (PPSV-23) 2013 11:28 AM  
  
 Not reviewed this visit You Were Diagnosed With   
  
 Codes Comments Hypothyroidism due to acquired atrophy of thyroid    -  Primary ICD-10-CM: E03.4 ICD-9-CM: 244.8, 246.8 Essential hypertension     ICD-10-CM: I10 
ICD-9-CM: 401.9 LVH (left ventricular hypertrophy)     ICD-10-CM: I51.7 ICD-9-CM: 429.3 Vitals BP Pulse Temp Resp Height(growth percentile) Weight(growth percentile) (!) 144/97 66 98.7 °F (37.1 °C) (Oral) 18 5' 4\" (1.626 m) 224 lb (101.6 kg) SpO2 BMI Smoking Status 100% 38.45 kg/m2 Never Smoker Vitals History BMI and BSA Data Body Mass Index Body Surface Area  
 38.45 kg/m 2 2.14 m 2 Preferred Pharmacy Pharmacy Name Phone [facility] [place], [address] [address] [phone] Your Updated Medication List  
  
   
This list is accurate as of 4/23/18  4:28 PM.  Always use your most recent med list.  
  
  
  
  
 aspirin delayed-release 81 mg tablet 81 mg.  
  
 atorvastatin 80 mg tablet Commonly known as:  LIPITOR 80 mg.  
  
 baclofen 20 mg tablet Commonly known as:  LIORESAL  
20 mg nightly. Comp Stocking, Knee,Reg, X-Lrg Misc Wear daily or as needed  
  
 ferrous sulfate 325 mg (65 mg iron) tablet 325 mg.  
  
 furosemide 20 mg tablet Commonly known as:  LASIX Take 1 Tab by mouth daily. levothyroxine 112 mcg tablet Commonly known as:  SYNTHROID Take 1 Tab by mouth Daily (before breakfast). metoprolol succinate 25 mg XL tablet Commonly known as:  TOPROL XL Take 0.5 Tabs by mouth daily. omeprazole 20 mg capsule Commonly known as:  PRILOSEC 40 mg two (2) times a day. OTHER Testosterone PO  
  
 tiZANidine 4 mg tablet Commonly known as:  [facility] Take 4 mg by mouth daily. venlafaxine 100 mg tablet Commonly known as:  [facility] TK 1 T PO QHS FOR ONE WEEK THEN 1 T BID. REPLACES CITALOPRAM  
  
 VITAMIN B-12 1,000 mcg tablet Generic drug:  cyanocobalamin Take 1,000 mcg by mouth daily. We Performed the Following AMB POC HEMOGLOBIN (HGB) [78270 CPT(R)] Follow-up Instructions Return in about 2 months (around 6/23/2018). Introducing [facility]! [employer] introduces [employer] patient portal. Now you can access parts of your medical record, email your doctor's office, and request medication refills online.    
 
1. In your internet browser, go to [email or web address] 2. Click on the First Time User? Click Here link in the Sign In box. You will see the New Member Sign Up page. 3. Enter your [employer] Access Code exactly as it appears below. You will not need to use this code after youve completed the sign-up process. If you do not sign up before the expiration date, you must request a new code. · [employer] Access Code: [email or web address] Expires: 6/25/2018  4:13 PM 
 
4. Enter the last four digits of your Social Security Number (xxxx) and Date of Birth (mm/dd/yyyy) as indicated and click Submit. You will be taken to the next sign-up page. 5. Create a [employer] ID. This will be your [employer] login ID and cannot be changed, so think of one that is secure and easy to remember. 6. Create a [employer] password. You can change your password at any time. 7. Enter your Password Reset Question and Answer. This can be used at a later time if you forget your password. 8. Enter your e-mail address. You will receive e-mail notification when new information is available in [address] 9. Click Sign Up. You can now view and download portions of your medical record. 10. Click the Download Summary menu link to download a portable copy of your medical information. If you have questions, please visit the Frequently Asked Questions section of the [employer] website. Remember, [employer] is NOT to be used for urgent needs. For medical emergencies, dial 911. Now available from your iPhone and Android! Please provide this summary of care documentation to your next provider. Your primary care clinician is listed as [address] If you have any questions after today's visit, please call [phone].

## 2018-04-23 NOTE — PROGRESS NOTES
[address] [facility] [employer]. is a 38 y.o.  male and presents with     Chief Complaint   Patient presents with   [facility] Follow Up    Anal Bleeding    Hypotension    Hypertension    Hypothyroidism    Ischemic Stroke       Pt had EGD at [facility] to look  For source of upper GI bleed. Pt was admitted 4 days later with hypotension and anemia with Hemoglobin in the range of 5.5. Pt had 3 units of PRBCs. Pt had repeat EGD that showed ulcer in the jejunum. Pt is supposed to get part of the small intenstine removed. Pt has appt with Hematologist.  Pt feels better today. He is still off Plavix. Pt denies any active bleeding or melena. Pt has stroke and has left sided weakness. Pt was getting PT at [facility] that was suspended when he went to the hospital  Blood pressure is okay now. 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  levothyroxine (SYNTHROID) 112 mcg tablet Take 1 Tab by mouth Daily (before breakfast).  aspirin delayed-release 81 mg tablet 81 mg.    atorvastatin (LIPITOR) 80 mg tablet 80 mg.    baclofen (LIORESAL) 20 mg tablet 20 mg nightly.  ferrous sulfate 325 mg (65 mg iron) tablet 325 mg.    omeprazole (PRILOSEC) 20 mg capsule 40 mg two (2) times a day.     venlafaxine (EFFEXOR) 100 mg tablet TK 1 T PO QHS FOR ONE WEEK THEN 1 T BID. REPLACES CITALOPRAM    metoprolol succinate (TOPROL XL) 25 mg XL tablet Take 0.5 Tabs by mouth daily.  furosemide (LASIX) 20 mg tablet Take 1 Tab by mouth daily.  MARY. STOCKING,KNEE,REG,XLRG (COMP STOCKING, KNEE,REG, X-LRG) misc Wear daily or as needed    cyanocobalamin (VITAMIN B-12) 1,000 mcg tablet Take 1,000 mcg by mouth daily.  OTHER Testosterone PO      No current facility-administered medications for this visit. Health Maintenance   Topic Date Due    DTaP/Tdap/Td series (1 - Tdap) 11/27/1994    Influenza Age 5 to Adult  Completed     Immunization History   Administered Date(s) Administered    Pneumococcal Polysaccharide (PPSV-23) 08/23/2013     No LMP for male patient. Allergies and Intolerances: Allergies   Allergen Reactions    Chocolate [Cocoa] Nausea Only       Family History:   Family History   Problem Relation Age of Onset    Hypertension Mother     Hypertension Father     Diabetes Father     Cancer Father      colon cancer       Social History:   He  reports that he has never smoked. He has never used smokeless tobacco.  He  reports that he does not drink alcohol.             Review of Systems:   General: negative for - chills, fatigue, fever, weight change  Psych: negative for - anxiety, depression, irritability or mood swings  ENT: negative for - headaches, hearing change, nasal congestion, oral lesions, sneezing or sore throat  Heme/ Lymph: negative for - bleeding problems, bruising, pallor or swollen lymph nodes  Endo: negative for - hot flashes, polydipsia/polyuria or temperature intolerance  Resp: negative for - cough, shortness of breath or wheezing  CV: negative for - chest pain, edema or palpitations  GI: negative for - abdominal pain, change in bowel habits, constipation, diarrhea or nausea/vomiting  : negative for - dysuria, hematuria, incontinence, pelvic pain or vulvar/vaginal symptoms  MSK: negative for - joint pain, joint swelling or muscle pain  Neuro: negative for - confusion, headaches, seizures or weakness  Derm: negative for - dry skin, hair changes, rash or skin lesion changes          Physical:   Vitals:   Vitals:    04/23/18 1549   BP: (!) 144/97   Pulse: 66   Resp: 18   Temp: 98.7 °F (37.1 °C)   TempSrc: Oral   SpO2: 100%   Weight: 224 lb (101.6 kg)   Height: 5' 4\" (1.626 m)           Exam:   HEENT- atraumatic,normocephalic, awake, oriented, well nourished  Neck - supple,no enlarged lymph nodes, no JVD, no thyromegaly  Chest- CTA, no rhonchi, no crackles  Heart- rrr, no murmurs / gallop/rub  Abdomen- soft,BS+,NT, no hepatosplenomegaly  Ext - no c/c/edema   Neuro- no focal deficits. Power 5/5 all extremities  Skin - warm,dry, no obvious rashes. Review of Data:   LABS:   Lab Results   Component Value Date/Time    WBC 6.7 02/01/2018 03:27 PM    HGB 11.1 (L) 02/01/2018 03:27 PM    HCT 34.9 (L) 02/01/2018 03:27 PM    PLATELET 215 94/20/5451 03:27 PM     Lab Results   Component Value Date/Time    Sodium 138 08/21/2013 01:45 AM    Potassium 4.3 08/21/2013 01:45 AM    Chloride 103 08/21/2013 01:45 AM    CO2 23 08/21/2013 01:45 AM    Glucose 84 08/21/2013 01:45 AM    BUN 17 08/21/2013 01:45 AM    Creatinine 1.01 08/21/2013 01:45 AM     Lab Results   Component Value Date/Time    Cholesterol, total 136 02/01/2018 03:27 PM    HDL Cholesterol 39 (L) 02/01/2018 03:27 PM    LDL, calculated 54.8 02/01/2018 03:27 PM    Triglyceride 211 (H) 02/01/2018 03:27 PM     No results found for: GPT        Impression / Plan:        ICD-10-CM ICD-9-CM    1. Hypothyroidism due to acquired atrophy of thyroid E03.4 244.8      246.8    2. Essential hypertension I10 401.9    3. LVH (left ventricular hypertrophy) I51.7 429.3      Anemia - improved, no active bleeding. H/o stroke - left sided weakness - will resume OT/PT. Explained to patient risk benefits of the medications. Advised patient to stop meds if having any side effects. Pt verbalized understanding of the instructions. I have discussed the diagnosis with the patient and the intended plan as seen in the above orders. The patient has received an after-visit summary and questions were answered concerning future plans. I have discussed medication side effects and warnings with the patient as well. I have reviewed the plan of care with the patient, accepted their input and they are in agreement with the treatment goals. Reviewed plan of care. Patient has provided input and agrees with goals.     Follow-up Disposition: Not on Niles Thomas MD

## 2018-04-23 NOTE — PROGRESS NOTES
1. Have you been to the ER, urgent care clinic since your last visit? Hospitalized since your last visit? 4/11/18 bloody stools    2. Have you seen or consulted any other health care providers outside of the [address] [place] [address] since your last visit? Include any pap smears or colon screening.  No

## 2018-05-07 ENCOUNTER — OFFICE VISIT (OUTPATIENT)
Dept: FAMILY MEDICINE CLINIC | Age: 43
End: 2018-05-07

## 2018-05-07 VITALS
SYSTOLIC BLOOD PRESSURE: 112 MMHG | DIASTOLIC BLOOD PRESSURE: 67 MMHG | HEART RATE: 65 BPM | TEMPERATURE: 98.5 F | BODY MASS INDEX: 38.38 KG/M2 | OXYGEN SATURATION: 98 % | HEIGHT: 64 IN | RESPIRATION RATE: 18 BRPM | WEIGHT: 224.8 LBS

## 2018-05-07 DIAGNOSIS — E03.4 HYPOTHYROIDISM DUE TO ACQUIRED ATROPHY OF THYROID: Chronic | ICD-10-CM

## 2018-05-07 DIAGNOSIS — R91.1 LUNG NODULE: ICD-10-CM

## 2018-05-07 DIAGNOSIS — I10 ESSENTIAL HYPERTENSION: Chronic | ICD-10-CM

## 2018-05-07 DIAGNOSIS — K76.89 LIVER NODULE: Primary | ICD-10-CM

## 2018-05-07 NOTE — PROGRESS NOTES
[address] [facility] [employer]. is a 38 y.o.  male and presents with     Chief Complaint   Patient presents with    Hypertension       Pt saw GI and has appt [facility] oncology. Pt is supposed to get CT abdomen and may be have liver biopsy. Pt deneis any active bleeding. P twas having OT/PT on Friday when the blood pressure shot up. Pt was sent to the ER. In the ER his blood pressure was good and was asked to follow up with PCP. 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9    Carotid artery occlusion with infarction ([facility] 10/2017    CVA (cerebral vascular accident) ([facility] 2016    Hypertension     Hypothyroid     Obesity     Thyroid disorder      Past Surgical History:   Procedure Laterality Date    HX OTHER SURGICAL  2/19/10    excision of lipoma, left temporal lesion, left face cyst, right face lesion, removal of 5 skin tags    HX TUMOR REMOVAL      tumor removed from head when 1years old     Current Outpatient Prescriptions   Medication Sig    tiZANidine (ZANAFLEX) 4 mg tablet Take 4 mg by mouth daily.  levothyroxine (SYNTHROID) 112 mcg tablet Take 1 Tab by mouth Daily (before breakfast).  aspirin delayed-release 81 mg tablet 81 mg.    atorvastatin (LIPITOR) 80 mg tablet 80 mg.    baclofen (LIORESAL) 20 mg tablet 20 mg nightly.  ferrous sulfate 325 mg (65 mg iron) tablet 325 mg.    omeprazole (PRILOSEC) 20 mg capsule 40 mg two (2) times a day.  venlafaxine (EFFEXOR) 100 mg tablet TK 1 T PO QHS FOR ONE WEEK THEN 1 T BID. REPLACES CITALOPRAM    metoprolol succinate (TOPROL XL) 25 mg XL tablet Take 0.5 Tabs by mouth daily.  furosemide (LASIX) 20 mg tablet Take 1 Tab by mouth daily.  MARY. STOCKING,KNEE,REG,XLRG (COMP STOCKING, KNEE,REG, X-LRG) misc Wear daily or as needed    cyanocobalamin (VITAMIN B-12) 1,000 mcg tablet Take 1,000 mcg by mouth daily.  OTHER Testosterone PO      No current facility-administered medications for this visit. Health Maintenance   Topic Date Due    DTaP/Tdap/Td series (1 - Tdap) 11/27/1994    Influenza Age 5 to Adult  08/01/2018     Immunization History   Administered Date(s) Administered    Pneumococcal Polysaccharide (PPSV-23) 08/23/2013     No LMP for male patient. Allergies and Intolerances: Allergies   Allergen Reactions    Chocolate [Cocoa] Nausea Only       Family History:   Family History   Problem Relation Age of Onset    Hypertension Mother     Hypertension Father     Diabetes Father     Cancer Father      colon cancer       Social History:   He  reports that he has never smoked. He has never used smokeless tobacco.  He  reports that he does not drink alcohol.             Review of Systems:   General: negative for - chills, fatigue, fever, weight change  Psych: negative for - anxiety, depression, irritability or mood swings  ENT: negative for - headaches, hearing change, nasal congestion, oral lesions, sneezing or sore throat  Heme/ Lymph: negative for - bleeding problems, bruising, pallor or swollen lymph nodes  Endo: negative for - hot flashes, polydipsia/polyuria or temperature intolerance  Resp: negative for - cough, shortness of breath or wheezing  CV: negative for - chest pain, edema or palpitations  GI: negative for - abdominal pain, change in bowel habits, constipation, diarrhea or nausea/vomiting  : negative for - dysuria, hematuria, incontinence, pelvic pain or vulvar/vaginal symptoms  MSK: negative for - joint pain, joint swelling or muscle pain  Neuro: negative for - confusion, headaches, seizures or weakness  Derm: negative for - dry skin, hair changes, rash or skin lesion changes          Physical:   Vitals:   Vitals:    05/07/18 0857   BP: 112/67   Pulse: 65   Resp: 18   Temp: 98.5 °F (36.9 °C)   TempSrc: Oral   SpO2: 98%   Weight: 224 lb 12.8 oz (102 kg)   Height: 5' 4\" (1.626 m) Exam:   HEENT- atraumatic,normocephalic, awake, oriented, well nourished  Neck - supple,no enlarged lymph nodes, no JVD, no thyromegaly  Chest- CTA, no rhonchi, no crackles  Heart- rrr, no murmurs / gallop/rub  Abdomen- soft,BS+,NT, no hepatosplenomegaly  Ext - no c/c/edema   Neuro- no focal deficits. Power 5/5 all extremities  Skin - warm,dry, no obvious rashes. Review of Data:   LABS:   Lab Results   Component Value Date/Time    WBC 6.7 02/01/2018 03:27 PM    HGB 11.1 (L) 02/01/2018 03:27 PM    HCT 34.9 (L) 02/01/2018 03:27 PM    PLATELET 928 89/54/4057 03:27 PM     Lab Results   Component Value Date/Time    Sodium 138 08/21/2013 01:45 AM    Potassium 4.3 08/21/2013 01:45 AM    Chloride 103 08/21/2013 01:45 AM    CO2 23 08/21/2013 01:45 AM    Glucose 84 08/21/2013 01:45 AM    BUN 17 08/21/2013 01:45 AM    Creatinine 1.01 08/21/2013 01:45 AM     Lab Results   Component Value Date/Time    Cholesterol, total 136 02/01/2018 03:27 PM    HDL Cholesterol 39 (L) 02/01/2018 03:27 PM    LDL, calculated 54.8 02/01/2018 03:27 PM    Triglyceride 211 (H) 02/01/2018 03:27 PM     No results found for: GPT        Impression / Plan:      1) Anemia  - follow up with GI and oncology    2) HTN - normotensive right now. Pt ate a bag of pretzels the night before he went to ER. Low salt diet. Monitor blood pressure. Lung nodule/liver nodule - has CT scan scheduled    Surgery shceduled for jejunal ulcer, site of bleeding. Explained to patient risk benefits of the medications. Advised patient to stop meds if having any side effects. Pt verbalized understanding of the instructions. I have discussed the diagnosis with the patient and the intended plan as seen in the above orders. The patient has received an after-visit summary and questions were answered concerning future plans. I have discussed medication side effects and warnings with the patient as well.  I have reviewed the plan of care with the patient, accepted their input and they are in agreement with the treatment goals. Reviewed plan of care. Patient has provided input and agrees with goals. Follow-up Disposition:  Return if symptoms worsen or fail to improve.     Edwina Snow MD

## 2018-05-07 NOTE — PROGRESS NOTES
Chief Complaint   Patient presents with    Hypertension     1. Have you been to the ER, urgent care clinic since your last visit? Hospitalized since your last visit? No    2. Have you seen or consulted any other health care providers outside of the [address] [place] [address] since your last visit? Include any pap smears or colon screening.  No

## 2018-05-07 NOTE — MR AVS SNAPSHOT
[facility] 
 
 
 [address] [facility] [address] [facility] 83 [address] 
[phone] Patient: Alexander Cortez MRN: [id number] :1975 Visit Information Date & Time Provider Department Dept. Phone Encounter #  
 2018  9:15 AM Gage Ortiz, [address] [place] [address] [phone] [id number] Follow-up Instructions Return if symptoms worsen or fail to improve. Your Appointments 2018  4:15 PM  
ROUTINE CARE with Gage Ortiz MD  
[facility] Appt Note: Return in about 2 months (around 2018). [address] [facility] [address] [facility] 83 [address] [place]  
  
   
 [id number] [place] [address] 1334  
  
    
 10/4/2018 11:30 AM  
Follow Up with Silvia Dumas MD  
Cardio Specialist at [facility] Appt Note: 6 months [address] [place] [address] 83 [address] 1334 Upcoming Health Maintenance Date Due DTaP/Tdap/Td series (1 - Tdap) 1994 Influenza Age 5 to Adult 2018 Allergies as of 2018  Review Complete On: 2018 By: Gage Ortiz MD  
  
 Severity Noted Reaction Type Reactions Chocolate [Cocoa]  2017    Nausea Only Current Immunizations  Never Reviewed Name Date Pneumococcal Polysaccharide (PPSV-23) 2013 11:28 AM  
  
 Not reviewed this visit You Were Diagnosed With   
  
 Codes Comments Liver nodule    -  Primary ICD-10-CM: K76.89 
ICD-9-CM: 573.8 Hypothyroidism due to acquired atrophy of thyroid     ICD-10-CM: E03.4 ICD-9-CM: 244.8, 246.8 Essential hypertension     ICD-10-CM: I10 
ICD-9-CM: 401.9 Lung nodule     ICD-10-CM: R91.1 ICD-9-CM: 793.11 Vitals BP Pulse Temp Resp Height(growth percentile) Weight(growth percentile) 112/67 (BP 1 Location: Right arm, BP Patient Position: At rest) 65 98.5 °F (36.9 °C) (Oral) 18 5' 4\" (1.626 m) 224 lb 12.8 oz (102 kg) SpO2 BMI Smoking Status 98% 38.59 kg/m2 Never Smoker BMI and BSA Data Body Mass Index Body Surface Area 38.59 kg/m 2 2.15 m 2 Preferred Pharmacy Pharmacy Name Phone [facility] [address] [phone] Your Updated Medication List  
  
   
This list is accurate as of 5/7/18  9:25 AM.  Always use your most recent med list.  
  
  
  
  
 aspirin delayed-release 81 mg tablet 81 mg.  
  
 atorvastatin 80 mg tablet Commonly known as:  LIPITOR 80 mg.  
  
 baclofen 20 mg tablet Commonly known as:  LIORESAL  
20 mg nightly. Comp Stocking, Knee,Reg, X-Lrg Misc Wear daily or as needed  
  
 ferrous sulfate 325 mg (65 mg iron) tablet 325 mg.  
  
 furosemide 20 mg tablet Commonly known as:  LASIX Take 1 Tab by mouth daily. levothyroxine 112 mcg tablet Commonly known as:  SYNTHROID Take 1 Tab by mouth Daily (before breakfast). metoprolol succinate 25 mg XL tablet Commonly known as:  TOPROL XL Take 0.5 Tabs by mouth daily. omeprazole 20 mg capsule Commonly known as:  PRILOSEC 40 mg two (2) times a day. OTHER Testosterone PO  
  
 tiZANidine 4 mg tablet Commonly known as:  Monica Uriah Take 4 mg by mouth daily. venlafaxine 100 mg tablet Commonly known as:  [facility] TK 1 T PO QHS FOR ONE WEEK THEN 1 T BID. REPLACES CITALOPRAM  
  
 VITAMIN B-12 1,000 mcg tablet Generic drug:  cyanocobalamin Take 1,000 mcg by mouth daily. Follow-up Instructions Return if symptoms worsen or fail to improve. Introducing [facility] & HEALTH SERVICES!    
 Blanca Villatoro introduces [employer] patient portal. Now you can access parts of your medical record, email your doctor's office, and request medication refills online. 1. In your internet browser, go to [email or web address] 2. Click on the First Time User? Click Here link in the Sign In box. You will see the New Member Sign Up page. 3. Enter your [employer] Access Code exactly as it appears below. You will not need to use this code after youve completed the sign-up process. If you do not sign up before the expiration date, you must request a new code. · [employer] Access Code: [email or web address] Expires: 6/25/2018  4:13 PM 
 
4. Enter the last four digits of your Social Security Number (xxxx) and Date of Birth (mm/dd/yyyy) as indicated and click Submit. You will be taken to the next sign-up page. 5. Create a [employer] ID. This will be your [employer] login ID and cannot be changed, so think of one that is secure and easy to remember. 6. Create a [employer] password. You can change your password at any time. 7. Enter your Password Reset Question and Answer. This can be used at a later time if you forget your password. 8. Enter your e-mail address. You will receive e-mail notification when new information is available in [address] 9. Click Sign Up. You can now view and download portions of your medical record. 10. Click the Download Summary menu link to download a portable copy of your medical information. If you have questions, please visit the Frequently Asked Questions section of the [employer] website. Remember, [employer] is NOT to be used for urgent needs. For medical emergencies, dial 911. Now available from your iPhone and Android! Please provide this summary of care documentation to your next provider. Your primary care clinician is listed as [id number]inne Ortiz. If you have any questions after today's visit, please call [phone].

## 2018-05-08 RX ORDER — SUCRALFATE 1 G/1
TABLET ORAL
Qty: 120 TAB | Refills: 0 | Status: SHIPPED | OUTPATIENT
Start: 2018-05-08 | End: 2018-07-17 | Stop reason: SDUPTHER

## 2018-06-21 ENCOUNTER — OFFICE VISIT (OUTPATIENT)
Dept: FAMILY MEDICINE CLINIC | Age: 43
End: 2018-06-21

## 2018-06-21 VITALS
TEMPERATURE: 97.6 F | DIASTOLIC BLOOD PRESSURE: 76 MMHG | BODY MASS INDEX: 37.39 KG/M2 | HEIGHT: 64 IN | SYSTOLIC BLOOD PRESSURE: 111 MMHG | RESPIRATION RATE: 18 BRPM | WEIGHT: 219 LBS | OXYGEN SATURATION: 100 % | HEART RATE: 57 BPM

## 2018-06-21 DIAGNOSIS — S89.92XA INJURY OF LEFT KNEE, INITIAL ENCOUNTER: ICD-10-CM

## 2018-06-21 DIAGNOSIS — E03.4 HYPOTHYROIDISM DUE TO ACQUIRED ATROPHY OF THYROID: Primary | Chronic | ICD-10-CM

## 2018-06-21 DIAGNOSIS — F32.89 OTHER DEPRESSION: ICD-10-CM

## 2018-06-21 DIAGNOSIS — R06.83 SNORING: ICD-10-CM

## 2018-06-21 DIAGNOSIS — M79.89 LEG SWELLING: ICD-10-CM
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
## 2018-06-21 DIAGNOSIS — I10 ESSENTIAL HYPERTENSION: Chronic | ICD-10-CM

## 2018-06-21 RX ORDER — VENLAFAXINE HYDROCHLORIDE 150 MG/1
150 CAPSULE, EXTENDED RELEASE ORAL DAILY
Qty: 30 CAP | Refills: 3 | Status: SHIPPED | OUTPATIENT
Start: 2018-06-21 | End: 2019-02-28 | Stop reason: SDUPTHER

## 2018-06-21 NOTE — MR AVS SNAPSHOT
[address] [place] [address] [address] [facility] 83 [address] [phone] Patient: Matt Carbajal. MRN: [id number] :1975 Visit Information Date & Time Provider Department Dept. Phone Encounter #  
 2018  4:15 PM Derrek Holstein, [place] [address]  Follow-up Instructions Return in about 3 months (around 2018). Your Appointments 10/4/2018 11:30 AM  
Follow Up with Ariel Donohue MD  
Cardio Specialist at [facility] Appt Note: 6 months [address] [address] [place] [address] Upcoming Health Maintenance Date Due DTaP/Tdap/Td series (1 - Tdap) 1994 Influenza Age 5 to Adult 2018 Allergies as of 2018  Review Complete On: 2018 By: Derrek Holstein, MD  
  
 Severity Noted Reaction Type Reactions Chocolate [Cocoa]  2017    Nausea Only Current Immunizations  Never Reviewed Name Date Pneumococcal Polysaccharide (PPSV-23) 2013 11:28 AM  
  
 Not reviewed this visit You Were Diagnosed With   
  
 Codes Comments Hypothyroidism due to acquired atrophy of thyroid    -  Primary ICD-10-CM: E03.4 ICD-9-CM: 244.8, 246.8 Essential hypertension     ICD-10-CM: I10 
ICD-9-CM: 401.9 Snoring     ICD-10-CM: R06.83 
ICD-9-CM: 786.09 Injury of left knee, initial encounter     ICD-10-CM: S89. 92XA ICD-9-CM: 959.7 Fall, initial encounter     ICD-10-CM: W19. [place] ICD-9-CM: E888.9 Other depression     ICD-10-CM: F32.89 ICD-9-CM: 116 Leg swelling     ICD-10-CM: M79.89 ICD-9-CM: 729.81 Vitals BP Pulse Temp Resp Height(growth percentile) Weight(growth percentile) 111/76 (BP 1 Location: Right arm, BP Patient Position: At rest) (!) 57 97.6 °F (36.4 °C) (Oral) 18 5' 4\" (1.626 m) 219 lb (99.3 kg) SpO2 BMI Smoking Status 100% 37.59 kg/m2 Never Smoker Vitals History BMI and BSA Data Body Mass Index Body Surface Area  
 37.59 kg/m 2 2.12 m 2 Preferred Pharmacy Pharmacy Name Phone [facility] [address] [phone] Your Updated Medication List  
  
   
This list is accurate as of 6/21/18  4:49 PM.  Always use your most recent med list.  
  
  
  
  
 aspirin delayed-release 81 mg tablet 81 mg.  
  
 atorvastatin 80 mg tablet Commonly known as:  LIPITOR 80 mg.  
  
 baclofen 20 mg tablet Commonly known as:  LIORESAL  
20 mg nightly. Comp Stocking, Knee,Reg, X-Lrg Misc Wear daily or as needed  
  
 ferrous sulfate 325 mg (65 mg iron) tablet 325 mg.  
  
 furosemide 20 mg tablet Commonly known as:  LASIX Take 1 Tab by mouth daily. levothyroxine 112 mcg tablet Commonly known as:  SYNTHROID Take 1 Tab by mouth Daily (before breakfast). metoprolol succinate 25 mg XL tablet Commonly known as:  TOPROL XL Take 0.5 Tabs by mouth daily. omeprazole 20 mg capsule Commonly known as:  PRILOSEC 40 mg two (2) times a day. OTHER Testosterone PO  
  
 sucralfate 1 gram tablet Commonly known as:  CARAFATE  
TAKE 1 TABLET BY MOUTH FOUR TIMES DAILY  
  
 tiZANidine 4 mg tablet Commonly known as:  Bisi [place] Take 4 mg by mouth daily. venlafaxine- mg capsule Commonly known as:  EFFEXOR-XR Take 1 Cap by mouth daily. VITAMIN B-12 1,000 mcg tablet Generic drug:  cyanocobalamin Take 1,000 mcg by mouth daily. Prescriptions Sent to Pharmacy Refills  
 venlafaxine-SR (EFFEXOR-XR) 150 mg capsule 3 Sig: Take 1 Cap by mouth daily. Class: Normal  
 Pharmacy: [facility], [address] [place] [address] Ph #: [phone]  Route: Oral  
  
 Follow-up Instructions Return in about 3 months (around 9/21/2018). To-Do List   
 06/21/2018 Imaging:  XR KNEE LT MAX 2 VWS Introducing [facility] & HEALTH SERVICES! Mary Kate Blanc introduces [employer] patient portal. Now you can access parts of your medical record, email your doctor's office, and request medication refills online. 1. In your internet browser, go to [email or web address] 2. Click on the First Time User? Click Here link in the Sign In box. You will see the New Member Sign Up page. 3. Enter your [employer] Access Code exactly as it appears below. You will not need to use this code after youve completed the sign-up process. If you do not sign up before the expiration date, you must request a new code. · [employer] Access Code: [email or web address] Expires: 6/25/2018  4:13 PM 
 
4. Enter the last four digits of your Social Security Number (xxxx) and Date of Birth (mm/dd/yyyy) as indicated and click Submit. You will be taken to the next sign-up page. 5. Create a [employer] ID. This will be your [employer] login ID and cannot be changed, so think of one that is secure and easy to remember. 6. Create a [employer] password. You can change your password at any time. 7. Enter your Password Reset Question and Answer. This can be used at a later time if you forget your password. 8. Enter your e-mail address. You will receive e-mail notification when new information is available in [address] 9. Click Sign Up. You can now view and download portions of your medical record. 10. Click the Download Summary menu link to download a portable copy of your medical information. If you have questions, please visit the Frequently Asked Questions section of the [employer] website. Remember, [employer] is NOT to be used for urgent needs. For medical emergencies, dial 911. Now available from your iPhone and Android! Please provide this summary of care documentation to your next provider. Your primary care clinician is listed as Leonardo Talley. If you have any questions after today's visit, please call [phone].

## 2018-06-22 NOTE — PROGRESS NOTES
[address] [place] [address]. is a 38 y.o.  male and presents with     Chief Complaint   Patient presents with    Hypertension    Knee Injury     LEFT    Anemia    Fall    Ischemic Stroke    Leg Swelling    Depression    Hypothyroidism       Pt  hsa h/o stroke mostly weelchair bound. He feel and his left knee hurts. Pt als ofeels very depresssed. He takes Effexor once daily. Pt has mild leg swelling/  Pt  Is supposed to get surgery on his upper GI tract where the source of bleeding is supposed to be. He is taking thyroid meds. He saw endocrinology and was told his thyroid is okay. 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9    Carotid artery occlusion with infarction ([facility] 10/2017    CVA (cerebral vascular accident) ([facility] 2016    Hypertension     Hypothyroid     Obesity     Thyroid disorder      Past Surgical History:   Procedure Laterality Date    HX OTHER SURGICAL  2/19/10    excision of lipoma, left temporal lesion, left face cyst, right face lesion, removal of 5 skin tags    HX TUMOR REMOVAL      tumor removed from head when 1years old     Current Outpatient Prescriptions   Medication Sig    venlafaxine-SR (EFFEXOR-XR) 150 mg capsule Take 1 Cap by mouth daily.  sucralfate (CARAFATE) 1 gram tablet TAKE 1 TABLET BY MOUTH FOUR TIMES DAILY    tiZANidine (ZANAFLEX) 4 mg tablet Take 4 mg by mouth daily.  levothyroxine (SYNTHROID) 112 mcg tablet Take 1 Tab by mouth Daily (before breakfast).  aspirin delayed-release 81 mg tablet 81 mg.    atorvastatin (LIPITOR) 80 mg tablet 80 mg.    baclofen (LIORESAL) 20 mg tablet 20 mg nightly.  ferrous sulfate 325 mg (65 mg iron) tablet 325 mg.    omeprazole (PRILOSEC) 20 mg capsule 40 mg two (2) times a day.  metoprolol succinate (TOPROL XL) 25 mg XL tablet Take 0.5 Tabs by mouth daily.     furosemide (LASIX) 20 mg tablet Take 1 Tab by mouth daily.  MARY. STOCKING,KNEE,REG,XLRG (COMP STOCKING, KNEE,REG, X-LRG) misc Wear daily or as needed    cyanocobalamin (VITAMIN B-12) 1,000 mcg tablet Take 1,000 mcg by mouth daily.  OTHER Testosterone PO      No current facility-administered medications for this visit. Health Maintenance   Topic Date Due    DTaP/Tdap/Td series (1 - Tdap) 11/27/1994    Influenza Age 5 to Adult  08/01/2018     Immunization History   Administered Date(s) Administered    Pneumococcal Polysaccharide (PPSV-23) 08/23/2013     No LMP for male patient. Allergies and Intolerances: Allergies   Allergen Reactions    Chocolate [Cocoa] Nausea Only       Family History:   Family History   Problem Relation Age of Onset    Hypertension Mother     Hypertension Father     Diabetes Father     Cancer Father      colon cancer       Social History:   He  reports that he has never smoked. He has never used smokeless tobacco.  He  reports that he does not drink alcohol.             Review of Systems:   General: negative for - chills, fatigue, fever, weight change  Psych: negative for - anxiety, depression, irritability or mood swings  ENT: negative for - headaches, hearing change, nasal congestion, oral lesions, sneezing or sore throat  Heme/ Lymph: negative for - bleeding problems, bruising, pallor or swollen lymph nodes  Endo: negative for - hot flashes, polydipsia/polyuria or temperature intolerance  Resp: negative for - cough, shortness of breath or wheezing  CV: negative for - chest pain, edema or palpitations  GI: negative for - abdominal pain, change in bowel habits, constipation, diarrhea or nausea/vomiting  : negative for - dysuria, hematuria, incontinence, pelvic pain or vulvar/vaginal symptoms  MSK: negative for - joint pain, joint swelling or muscle pain  Neuro: negative for - confusion, headaches, seizures or weakness  Derm: negative for - dry skin, hair changes, rash or skin lesion changes          Physical:   Vitals:   Vitals:    06/21/18 1619   BP: 111/76   Pulse: (!) 57   Resp: 18   Temp: 97.6 °F (36.4 °C)   TempSrc: Oral   SpO2: 100%   Weight: 219 lb (99.3 kg)   Height: 5' 4\" (1.626 m)           Exam:   HEENT- atraumatic,normocephalic, awake, oriented, well nourished  Neck - supple,no enlarged lymph nodes, no JVD, no thyromegaly  Chest- CTA, no rhonchi, no crackles  Heart- rrr, no murmurs / gallop/rub  Abdomen- soft,BS+,NT, no hepatosplenomegaly  Ext - no c/c/edema , left knee mild swelling  Neuro- whell chair boiund,   Skin - warm,dry, no obvious rashes. Review of Data:   LABS:   Lab Results   Component Value Date/Time    WBC 6.7 02/01/2018 03:27 PM    HGB 11.1 (L) 02/01/2018 03:27 PM    HCT 34.9 (L) 02/01/2018 03:27 PM    PLATELET 077 83/28/0582 03:27 PM     Lab Results   Component Value Date/Time    Sodium 138 08/21/2013 01:45 AM    Potassium 4.3 08/21/2013 01:45 AM    Chloride 103 08/21/2013 01:45 AM    CO2 23 08/21/2013 01:45 AM    Glucose 84 08/21/2013 01:45 AM    BUN 17 08/21/2013 01:45 AM    Creatinine 1.01 08/21/2013 01:45 AM     Lab Results   Component Value Date/Time    Cholesterol, total 136 02/01/2018 03:27 PM    HDL Cholesterol 39 (L) 02/01/2018 03:27 PM    LDL, calculated 54.8 02/01/2018 03:27 PM    Triglyceride 211 (H) 02/01/2018 03:27 PM     No results found for: GPT        Impression / Plan:        ICD-10-CM ICD-9-CM    1. Hypothyroidism due to acquired atrophy of thyroid E03.4 244.8      246.8    2. Essential hypertension I10 401.9    3. Snoring R06.83 786.09    4. Injury of left knee, initial encounter S89. 92XA 959.7 XR KNEE LT MAX 2 VWS   5. Fall, initial encounter [address] XXXA E888.9    6. Other depression F32.89 311 venlafaxine-SR (EFFEXOR-XR) 150 mg capsule   7. Leg swelling M79.89 729.81      HTN/Hypothyroidsim / h/o stroke - stable    Anemia - stable , supposed to get surgery soon. Explained to patient risk benefits of the medications. Advised patient to stop meds if having any side effects. Pt verbalized understanding of the instructions. I have discussed the diagnosis with the patient and the intended plan as seen in the above orders. The patient has received an after-visit summary and questions were answered concerning future plans. I have discussed medication side effects and warnings with the patient as well. I have reviewed the plan of care with the patient, accepted their input and they are in agreement with the treatment goals. Reviewed plan of care. Patient has provided input and agrees with goals. Follow-up Disposition:  Return in about 3 months (around 9/21/2018).     [address] [address]inne Ortiz MD

## 2018-07-13 ENCOUNTER — HOSPITAL ENCOUNTER (OUTPATIENT)
Dept: GENERAL RADIOLOGY | Age: 43
Discharge: HOME | End: 2018-07-13
Payer: MEDICAID

## 2018-07-13 DIAGNOSIS — S89.92XA INJURY OF LEFT KNEE, INITIAL ENCOUNTER: ICD-10-CM

## 2018-07-13 PROCEDURE — 73560 X-RAY EXAM OF KNEE 1 OR 2: CPT

## 2018-07-17 RX ORDER — SUCRALFATE 1 G/1
TABLET ORAL
Qty: 120 TAB | Refills: 0 | Status: SHIPPED | OUTPATIENT
Start: 2018-07-17 | End: 2018-09-20 | Stop reason: SDUPTHER

## 2018-09-20 ENCOUNTER — OFFICE VISIT (OUTPATIENT)
Dept: FAMILY MEDICINE CLINIC | Age: 43
End: 2018-09-20

## 2018-09-20 VITALS
SYSTOLIC BLOOD PRESSURE: 79 MMHG | HEART RATE: 59 BPM | BODY MASS INDEX: 37.05 KG/M2 | TEMPERATURE: 98 F | RESPIRATION RATE: 14 BRPM | OXYGEN SATURATION: 95 % | HEIGHT: 64 IN | WEIGHT: 217 LBS | DIASTOLIC BLOOD PRESSURE: 50 MMHG

## 2018-09-20 DIAGNOSIS — Z87.19 HISTORY OF GI BLEED: ICD-10-CM

## 2018-09-20 DIAGNOSIS — R30.0 DYSURIA: ICD-10-CM

## 2018-09-20 DIAGNOSIS — I95.89 OTHER SPECIFIED HYPOTENSION: Primary | ICD-10-CM

## 2018-09-20 DIAGNOSIS — I10 ESSENTIAL HYPERTENSION: Chronic | ICD-10-CM

## 2018-09-20 DIAGNOSIS — E03.4 HYPOTHYROIDISM DUE TO ACQUIRED ATROPHY OF THYROID: Chronic | ICD-10-CM

## 2018-09-20 DIAGNOSIS — R06.83 SNORING: ICD-10-CM

## 2018-09-20 RX ORDER — SUCRALFATE 1 G/1
1 TABLET ORAL 4 TIMES DAILY
Qty: 120 TAB | Refills: 0 | Status: SHIPPED | OUTPATIENT
Start: 2018-09-20 | End: 2018-11-16 | Stop reason: SDUPTHER

## 2018-09-20 RX ORDER — PANTOPRAZOLE SODIUM 40 MG/1
40 TABLET, DELAYED RELEASE ORAL DAILY
COMMUNITY
End: 2018-10-09 | Stop reason: SDUPTHER

## 2018-09-20 NOTE — PROGRESS NOTES
1. Have you been to the ER, urgent care clinic since your last visit? Hospitalized since your last visit? No    2. Have you seen or consulted any other health care providers outside of the [address] [place] [address] since your last visit? Include any pap smears or colon screening. therapy       Patient presents for ROV - BP low.

## 2018-09-20 NOTE — MR AVS SNAPSHOT
[address]  [place] [address] [address] 
[phone] Patient: Carlos Maddox MRN: [id number] :1975 Visit Information Date & Time Provider Department Dept. Phone Encounter #  
 2018 10:30 AM Emily Martinez [facility] [phone] [id number] Your Appointments 10/4/2018 11:30 AM  
Follow Up with Ariel Euceda MD  
Cardio Specialist at [address]) Appt Note: 6 months [facility] [address] [place] [address] Upcoming Health Maintenance Date Due DTaP/Tdap/Td series (1 - Tdap) 1994 Influenza Age 5 to Adult 2018 Allergies as of 2018  Review Complete On: 2018 By: Bobo Martinez LPN Severity Noted Reaction Type Reactions Chocolate [Cocoa]  2017    Nausea Only Current Immunizations  Never Reviewed Name Date Pneumococcal Polysaccharide (PPSV-23) 2013 11:28 AM  
  
 Not reviewed this visit You Were Diagnosed With   
  
 Codes Comments Other specified hypotension    -  Primary ICD-10-CM: I95.89 ICD-9-CM: 458.8 Hypothyroidism due to acquired atrophy of thyroid     ICD-10-CM: E03.4 ICD-9-CM: 244.8, 246.8 Essential hypertension     ICD-10-CM: I10 
ICD-9-CM: 401.9 Snoring     ICD-10-CM: R06.83 
ICD-9-CM: 786.09 Dysuria     ICD-10-CM: R30.0 ICD-9-CM: 788.1 History of GI bleed     ICD-10-CM: Z87.19 ICD-9-CM: V12.79 Vitals BP Pulse Temp Resp Height(growth percentile) Weight(growth percentile) (!) 79/50 (!) 59 98 °F (36.7 °C) (Oral) 14 5' 4\" (1.626 m) 217 lb (98.4 kg) SpO2 BMI Smoking Status 95% 37.25 kg/m2 Never Smoker Vitals History BMI and BSA Data Body Mass Index Body Surface Area  
 37.25 kg/m 2 2.11 m 2 Preferred Pharmacy Pharmacy Name Phone [facility] [place], [address] [place] [address] [phone] Your Updated Medication List  
  
   
This list is accurate as of 9/20/18 10:44 AM.  Always use your most recent med list.  
  
  
  
  
 aspirin delayed-release 81 mg tablet 81 mg.  
  
 atorvastatin 80 mg tablet Commonly known as:  LIPITOR 80 mg.  
  
 baclofen 20 mg tablet Commonly known as:  LIORESAL  
20 mg two (2) times a day. Comp Stocking, Knee,Reg, X-Lrg Misc Wear daily or as needed  
  
 ferrous sulfate 325 mg (65 mg iron) tablet 325 mg.  
  
 furosemide 20 mg tablet Commonly known as:  LASIX Take 1 Tab by mouth daily. levothyroxine 112 mcg tablet Commonly known as:  SYNTHROID Take 1 Tab by mouth Daily (before breakfast). metoprolol succinate 25 mg XL tablet Commonly known as:  TOPROL XL Take 0.5 Tabs by mouth daily. OTHER Testosterone PO  
  
 PROTONIX 40 mg tablet Generic drug:  pantoprazole Take 40 mg by mouth daily. sucralfate 1 gram tablet Commonly known as:  Ned Avers Take 1 Tab by mouth four (4) times daily. tiZANidine 4 mg tablet Commonly known as:  Dorenda Sham Take 4 mg by mouth two (2) times a day. venlafaxine- mg capsule Commonly known as:  EFFEXOR-XR Take 1 Cap by mouth daily. VITAMIN B-12 1,000 mcg tablet Generic drug:  cyanocobalamin Take 1,000 mcg by mouth daily. Prescriptions Sent to Pharmacy Refills  
 sucralfate (CARAFATE) 1 gram tablet 0 Sig: Take 1 Tab by mouth four (4) times daily. Class: Normal  
 [facility], [address] [place] [address] Ph #: [phone] Route: Oral  
  
To-Do List   
 09/20/2018 Lab:  ACTH   
  
 09/20/2018 Lab:  CORTISOL   
  
 09/20/2018 Microbiology:  CULTURE, URINE   
  
 09/20/2018 Lab: METABOLIC PANEL, COMPREHENSIVE   
  
 09/20/2018 Lab:  TSH 3RD GENERATION   
  
 09/20/2018 Lab:  URINALYSIS W/ RFLX MICROSCOPIC Introducing [facility] & HEALTH SERVICES! [facility] introduces [employer] patient portal. Now you can access parts of your medical record, email your doctor's office, and request medication refills online. 1. In your internet browser, go to [email or web address] [email or web address] IT. [email or web address]/IT MOVES IT 2. Click on the First Time User? Click Here link in the Sign In box. You will see the New Member Sign Up page. 3. Enter your [employer] Access Code exactly as it appears below. You will not need to use this code after youve completed the sign-up process. If you do not sign up before the expiration date, you must request a new code. · [employer] Access Code: [email or web address] Expires: 10/11/2018 11:06 AM 
 
4. Enter the last four digits of your Social Security Number (xxxx) and Date of Birth (mm/dd/yyyy) as indicated and click Submit. You will be taken to the next sign-up page. 5. Create a [employer] ID. This will be your [employer] login ID and cannot be changed, so think of one that is secure and easy to remember. 6. Create a [employer] password. You can change your password at any time. 7. Enter your Password Reset Question and Answer. This can be used at a later time if you forget your password. 8. Enter your e-mail address. You will receive e-mail notification when new information is available in [address] 9. Click Sign Up. You can now view and download portions of your medical record. 10. Click the Download Summary menu link to download a portable copy of your medical information. If you have questions, please visit the Frequently Asked Questions section of the [employer] website. Remember, [employer] is NOT to be used for urgent needs. For medical emergencies, dial 911. Now available from your iPhone and Android! Please provide this summary of care documentation to your next provider. Your primary care clinician is listed as Saige Robledo. If you have any questions after today's visit, please call [phone].

## 2018-09-20 NOTE — PROGRESS NOTES
[address] [place] [address]. is a 38 y.o.  male and presents with     Chief Complaint   Patient presents with    Hypotension     amb poc hgb - 10.8    Snoring     Pt was in Rehab yesterday  And was sleepy. Pts father reports that he has been sleepy , drowsy and also BP has been running low. Pt denies nausea, vomiting, diarrhea.,  He deneis melena, or rectal bleeding. He had upper GI blled about 3 months back. However GI saw the pt and tookd care of the bleed. Pt denies taking OTC sleeping pills. Pt denies fever , chills , urinary symptoms  Pt does snore and is supposed to get sleep study. 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9    Carotid artery occlusion with infarction ([facility] [address] 10/2017    CVA (cerebral vascular accident) ([facility] [address] 2016    Hypertension     Hypothyroid     Obesity     Thyroid disorder      Past Surgical History:   Procedure Laterality Date    HX OTHER SURGICAL  2/19/10    excision of lipoma, left temporal lesion, left face cyst, right face lesion, removal of 5 skin tags    HX TUMOR REMOVAL      tumor removed from head when 1years old     Current Outpatient Prescriptions   Medication Sig    sucralfate (CARAFATE) 1 gram tablet Take 1 Tab by mouth four (4) times daily.  pantoprazole (PROTONIX) 40 mg tablet Take 40 mg by mouth daily.  venlafaxine-SR (EFFEXOR-XR) 150 mg capsule Take 1 Cap by mouth daily.  tiZANidine (ZANAFLEX) 4 mg tablet Take 4 mg by mouth two (2) times a day.  levothyroxine (SYNTHROID) 112 mcg tablet Take 1 Tab by mouth Daily (before breakfast).  aspirin delayed-release 81 mg tablet 81 mg.    atorvastatin (LIPITOR) 80 mg tablet 80 mg.    baclofen (LIORESAL) 20 mg tablet 20 mg two (2) times a day.  ferrous sulfate 325 mg (65 mg iron) tablet 325 mg.    metoprolol succinate (TOPROL XL) 25 mg XL tablet Take 0.5 Tabs by mouth daily.     furosemide (LASIX) 20 mg tablet Take 1 Tab by mouth daily.  MARY. STOCKING,KNEE,REG,XLRG (COMP STOCKING, KNEE,REG, X-LRG) misc Wear daily or as needed    cyanocobalamin (VITAMIN B-12) 1,000 mcg tablet Take 1,000 mcg by mouth daily.  OTHER Testosterone PO      No current facility-administered medications for this visit. Health Maintenance   Topic Date Due    DTaP/Tdap/Td series (1 - Tdap) 11/27/1994    Influenza Age 5 to Adult  08/01/2018     Immunization History   Administered Date(s) Administered    Pneumococcal Polysaccharide (PPSV-23) 08/23/2013     No LMP for male patient. Allergies and Intolerances: Allergies   Allergen Reactions    Chocolate [Cocoa] Nausea Only       Family History:   Family History   Problem Relation Age of Onset    Hypertension Mother     Hypertension Father     Diabetes Father     Cancer Father      colon cancer       Social History:   He  reports that he has never smoked. He has never used smokeless tobacco.  He  reports that he does not drink alcohol.             Review of Systems:   General: negative for - chills, fatigue, fever, weight change  Psych: negative for - anxiety, depression, irritability or mood swings  ENT: negative for - headaches, hearing change, nasal congestion, oral lesions, sneezing or sore throat  Heme/ Lymph: negative for - bleeding problems, bruising, pallor or swollen lymph nodes  Endo: negative for - hot flashes, polydipsia/polyuria or temperature intolerance  Resp: negative for - cough, shortness of breath or wheezing  CV: negative for - chest pain, edema or palpitations  GI: negative for - abdominal pain, change in bowel habits, constipation, diarrhea or nausea/vomiting  : negative for - dysuria, hematuria, incontinence, pelvic pain or vulvar/vaginal symptoms  MSK: negative for - joint pain, joint swelling or muscle pain  Neuro: negative for - confusion, headaches, seizures or weakness  Derm: negative for - dry skin, hair changes, rash or skin lesion changes          Physical:   Vitals:   Vitals:    09/20/18 1018 09/20/18 1026 09/20/18 1034   BP: (!) 71/42 (!) 71/40 (!) 79/50   Pulse: (!) 59     Resp: 14     Temp: 98 °F (36.7 °C)     TempSrc: Oral     SpO2: 95%     Weight: 217 lb (98.4 kg)     Height: 5' 4\" (1.626 m)             Exam:   HEENT- atraumatic,normocephalic, awake, oriented, well nourished, looks sleepy ,is able to respond to commands,   Neck - supple,no enlarged lymph nodes, no JVD, no thyromegaly  Chest- CTA, no rhonchi, no crackles  Heart- rrr, no murmurs / gallop/rub  Abdomen- soft,BS+,NT, no hepatosplenomegaly  Ext - no c/c/edema   Neuro-in wheelchair, lef tsided weakness  Skin - warm,dry, no obvious rashes. Review of Data:   LABS:   Lab Results   Component Value Date/Time    WBC 6.7 02/01/2018 03:27 PM    HGB 11.1 (L) 02/01/2018 03:27 PM    HCT 34.9 (L) 02/01/2018 03:27 PM    PLATELET 980 37/04/7676 03:27 PM     Lab Results   Component Value Date/Time    Sodium 138 08/21/2013 01:45 AM    Potassium 4.3 08/21/2013 01:45 AM    Chloride 103 08/21/2013 01:45 AM    CO2 23 08/21/2013 01:45 AM    Glucose 84 08/21/2013 01:45 AM    BUN 17 08/21/2013 01:45 AM    Creatinine 1.01 08/21/2013 01:45 AM     Lab Results   Component Value Date/Time    Cholesterol, total 136 02/01/2018 03:27 PM    HDL Cholesterol 39 (L) 02/01/2018 03:27 PM    LDL, calculated 54.8 02/01/2018 03:27 PM    Triglyceride 211 (H) 02/01/2018 03:27 PM     No results found for: GPT        Impression / Plan:        ICD-10-CM ICD-9-CM    1. Other specified hypotension I95.89 458.8 CORTISOL      ACTH   2. Hypothyroidism due to acquired atrophy of thyroid E03.4 244.8 TSH 3RD GENERATION     246.8    3. Essential hypertension E14 343.9 METABOLIC PANEL, COMPREHENSIVE   4. Snoring R06.83 786.09    5. Dysuria R30.0 788. 1 URINALYSIS W/ RFLX MICROSCOPIC      CULTURE, URINE   6.  History of GI bleed Z87.19 V12.79 sucralfate (CARAFATE) 1 gram tablet     Anemia - is stable    Will refer pt to ER since pt is hypotenisive, Father said he [place] pt the [place] to [address]. Explained to patient risk benefits of the medications. Advised patient to stop meds if having any side effects. Pt verbalized understanding of the instructions. I have discussed the diagnosis with the patient and the intended plan as seen in the above orders. The patient has received an after-visit summary and questions were answered concerning future plans. I have discussed medication side effects and warnings with the patient as well. I have reviewed the plan of care with the patient, accepted their input and they are in agreement with the treatment goals. Reviewed plan of care. Patient has provided input and agrees with goals.     Follow-up Disposition: Not on Israel Aschoff, MD

## 2018-09-25 ENCOUNTER — OFFICE VISIT (OUTPATIENT)
Dept: FAMILY MEDICINE CLINIC | Age: 43
End: 2018-09-25

## 2018-09-25 VITALS
RESPIRATION RATE: 16 BRPM | WEIGHT: 217 LBS | HEIGHT: 64 IN | OXYGEN SATURATION: 99 % | DIASTOLIC BLOOD PRESSURE: 91 MMHG | HEART RATE: 75 BPM | TEMPERATURE: 98.2 F | BODY MASS INDEX: 37.05 KG/M2 | SYSTOLIC BLOOD PRESSURE: 141 MMHG

## 2018-09-25 DIAGNOSIS — E03.4 HYPOTHYROIDISM DUE TO ACQUIRED ATROPHY OF THYROID: Chronic | ICD-10-CM

## 2018-09-25 DIAGNOSIS — G89.29 CHRONIC KNEE PAIN, UNSPECIFIED LATERALITY: Primary | ICD-10-CM

## 2018-09-25 DIAGNOSIS — I10 ESSENTIAL HYPERTENSION: Chronic | ICD-10-CM

## 2018-09-25 DIAGNOSIS — Z23 ENCOUNTER FOR IMMUNIZATION: ICD-10-CM

## 2018-09-25 DIAGNOSIS — I51.7 LVH (LEFT VENTRICULAR HYPERTROPHY): ICD-10-CM

## 2018-09-25 DIAGNOSIS — E78.00 HYPERCHOLESTEREMIA: ICD-10-CM

## 2018-09-25 DIAGNOSIS — M25.569 CHRONIC KNEE PAIN, UNSPECIFIED LATERALITY: Primary | ICD-10-CM

## 2018-09-25 RX ORDER — DICLOFENAC SODIUM 10 MG/G
GEL TOPICAL 4 TIMES DAILY
Qty: 100 G | Refills: 3 | Status: SHIPPED | OUTPATIENT
Start: 2018-09-25 | End: 2021-01-20

## 2018-09-25 NOTE — PROGRESS NOTES
[address] [place] [address]. is a 38 y.o.  male and presents with     Chief Complaint   Patient presents with    Hypertension    Extremity Weakness    Anemia    Hypothyroidism    Cholesterol Problem    Immunization/Injection     Pt was seen in the ER for hypotension. Work up was neg per pts father . Pt was given IV fluids. Anemia was ruled out. Zanaflex was reduced to once daily and pt has been more awake. Pt is holding metprolol as BP was low. But now BP is up. Pt is more awake during the day. Pt sees Endocrinology for hypothyroidsim. Pt has left sided weakness and has pain and contractures in his arm . Pt is supposed to get Botox injections. Pt has pain in left knee. 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9    Carotid artery occlusion with infarction ([facility] [address] 10/2017    CVA (cerebral vascular accident) ([facility] [address] 2016    Hypertension     Hypothyroid     Obesity     Thyroid disorder      Past Surgical History:   Procedure Laterality Date    HX OTHER SURGICAL  2/19/10    excision of lipoma, left temporal lesion, left face cyst, right face lesion, removal of 5 skin tags    HX TUMOR REMOVAL      tumor removed from head when 1years old     Current Outpatient Prescriptions   Medication Sig    diclofenac (VOLTAREN) 1 % gel Apply  to affected area four (4) times daily.  sucralfate (CARAFATE) 1 gram tablet Take 1 Tab by mouth four (4) times daily.  pantoprazole (PROTONIX) 40 mg tablet Take 40 mg by mouth daily.  venlafaxine-SR (EFFEXOR-XR) 150 mg capsule Take 1 Cap by mouth daily.  levothyroxine (SYNTHROID) 112 mcg tablet Take 1 Tab by mouth Daily (before breakfast).  aspirin delayed-release 81 mg tablet 81 mg.    atorvastatin (LIPITOR) 80 mg tablet 80 mg.    baclofen (LIORESAL) 20 mg tablet 20 mg two (2) times a day.  ferrous sulfate 325 mg (65 mg iron) tablet 325 mg.  metoprolol succinate (TOPROL XL) 25 mg XL tablet Take 0.5 Tabs by mouth daily.  furosemide (LASIX) 20 mg tablet Take 1 Tab by mouth daily.  MARY. STOCKING,KNEE,REG,XLRG (COMP STOCKING, KNEE,REG, X-LRG) misc Wear daily or as needed    cyanocobalamin (VITAMIN B-12) 1,000 mcg tablet Take 1,000 mcg by mouth daily.  OTHER Testosterone PO      No current facility-administered medications for this visit. Health Maintenance   Topic Date Due    DTaP/Tdap/Td series (1 - Tdap) 11/27/1994    Influenza Age 5 to Adult  08/01/2018     Immunization History   Administered Date(s) Administered    Influenza Vaccine (Quad) PF 09/25/2018    Pneumococcal Polysaccharide (PPSV-23) 08/23/2013     No LMP for male patient. Allergies and Intolerances: Allergies   Allergen Reactions    Chocolate [Cocoa] Nausea Only       Family History:   Family History   Problem Relation Age of Onset    Hypertension Mother     Hypertension Father     Diabetes Father     Cancer Father      colon cancer       Social History:   He  reports that he has never smoked. He has never used smokeless tobacco.  He  reports that he does not drink alcohol.             Review of Systems:   General: negative for - chills, fatigue, fever, weight change  Psych: negative for - anxiety, depression, irritability or mood swings  ENT: negative for - headaches, hearing change, nasal congestion, oral lesions, sneezing or sore throat  Heme/ Lymph: negative for - bleeding problems, bruising, pallor or swollen lymph nodes  Endo: negative for - hot flashes, polydipsia/polyuria or temperature intolerance  Resp: negative for - cough, shortness of breath or wheezing  CV: negative for - chest pain, edema or palpitations  GI: negative for - abdominal pain, change in bowel habits, constipation, diarrhea or nausea/vomiting  : negative for - dysuria, hematuria, incontinence, pelvic pain or vulvar/vaginal symptoms  MSK: negative for - joint pain, joint swelling or muscle pain, pos for left knee pain  Neuro: negative for - confusion, headaches, seizures or weakness  Derm: negative for - dry skin, hair changes, rash or skin lesion changes          Physical:   Vitals:   Vitals:    09/25/18 1528   BP: (!) 141/91   Pulse: 75   Resp: 16   Temp: 98.2 °F (36.8 °C)   TempSrc: Oral   SpO2: 99%   Weight: 217 lb (98.4 kg)   Height: 5' 4\" (1.626 m)           Exam:   HEENT- atraumatic,normocephalic, awake, oriented, well nourished  Neck - supple,no enlarged lymph nodes, no JVD, no thyromegaly  Chest- CTA, no rhonchi, no crackles  Heart- rrr, no murmurs / gallop/rub  Abdomen- soft,BS+,NT, no hepatosplenomegaly  Ext - no c/c/edema   Neuro- no focal deficits. Power 5/5 all extremities, pt more awake, left sided weakness, contracutres in lef tarm, splint on left ankle - for foot drop. Skin - warm,dry, no obvious rashes. Review of Data:   LABS:   Lab Results   Component Value Date/Time    WBC 6.7 02/01/2018 03:27 PM    HGB 11.1 (L) 02/01/2018 03:27 PM    HCT 34.9 (L) 02/01/2018 03:27 PM    PLATELET 293 50/00/8146 03:27 PM     Lab Results   Component Value Date/Time    Sodium 138 08/21/2013 01:45 AM    Potassium 4.3 08/21/2013 01:45 AM    Chloride 103 08/21/2013 01:45 AM    CO2 23 08/21/2013 01:45 AM    Glucose 84 08/21/2013 01:45 AM    BUN 17 08/21/2013 01:45 AM    Creatinine 1.01 08/21/2013 01:45 AM     Lab Results   Component Value Date/Time    Cholesterol, total 136 02/01/2018 03:27 PM    HDL Cholesterol 39 (L) 02/01/2018 03:27 PM    LDL, calculated 54.8 02/01/2018 03:27 PM    Triglyceride 211 (H) 02/01/2018 03:27 PM     No results found for: GPT        Impression / Plan:        ICD-10-CM ICD-9-CM    1. Chronic knee pain, unspecified laterality M25.569 719.46 URIC ACID    G89.29 338.29 diclofenac (VOLTAREN) 1 % gel   2. Hypothyroidism due to acquired atrophy of thyroid E03.4 244.8      246.8    3. Essential hypertension I10 401.9    4.  Hypercholesteremia E78.00 272.0 5. LVH (left ventricular hypertrophy) I51.7 429.3    6. Encounter for immunization Z23 V03.89 INFLUENZA VIRUS VAC QUAD,SPLIT,PRESV FREE SYRINGE IM     Hypotension - resolved    Drowsiness - will ask pt to stop tizanidine    HTN - restart metorpolol. Anemia - stable, no active bleeding    Left arm weakness, contractures - passive exercises at the joints. Explained to patient risk benefits of the medications. Advised patient to stop meds if having any side effects. Pt verbalized understanding of the instructions. I have discussed the diagnosis with the patient and the intended plan as seen in the above orders. The patient has received an after-visit summary and questions were answered concerning future plans. I have discussed medication side effects and warnings with the patient as well. I have reviewed the plan of care with the patient, accepted their input and they are in agreement with the treatment goals. Reviewed plan of care. Patient has provided input and agrees with goals. Follow-up Disposition:  Return in about 3 months (around 12/25/2018).     Stephanie Jaime MD

## 2018-09-25 NOTE — PROGRESS NOTES
After obtaining consent, and per orders of Dr. Daren Orellana, injection of FLU vaccine given by Sandra Robertson LPN. Patient instructed to remain in clinic for 20 minutes afterwards, and to report any adverse reaction to me immediately.

## 2018-09-25 NOTE — PATIENT INSTRUCTIONS
Vaccine Information Statement    Influenza (Flu) Vaccine (Inactivated or Recombinant): What you need to know    Many Vaccine Information Statements are available in [place] and other languages. See www.immunize.org/vis  Hojas de Información Sobre Vacunas están disponibles en Español y en muchos otros idiomas. Visite www.immunize.org/vis    1. Why get vaccinated? Influenza (flu) is a contagious disease that spreads around the [place] every year, usually between October and May. Flu is caused by influenza viruses, and is spread mainly by coughing, sneezing, and close contact. Anyone can get flu. Flu strikes suddenly and can last several days. Symptoms vary by age, but can include:   fever/chills   sore throat   muscle aches   fatigue   cough   headache    runny or stuffy nose    Flu can also lead to pneumonia and blood infections, and cause diarrhea and seizures in children. If you have a medical condition, such as heart or lung disease, flu can make it worse. Flu is more dangerous for some people. Infants and young children, people 72years of age and older, pregnant women, and people with certain health conditions or a weakened immune system are at greatest risk. Each year thousands of people in the [facility] die from flu, and many more are hospitalized. Flu vaccine can:   keep you from getting flu,   make flu less severe if you do get it, and   keep you from spreading flu to your family and other people. 2. Inactivated and recombinant flu vaccines    A dose of flu vaccine is recommended every flu season. Children 6 months through 6years of age may need two doses during the same flu season. Everyone else needs only one dose each flu season.        Some inactivated flu vaccines contain a very small amount of a mercury-based preservative called thimerosal. Studies have not shown thimerosal in vaccines to be harmful, but flu vaccines that do not contain thimerosal are available. There is no live flu virus in flu shots. They cannot cause the flu. There are many flu viruses, and they are always changing. Each year a new flu vaccine is made to protect against three or four viruses that are likely to cause disease in the upcoming flu season. But even when the vaccine doesnt exactly match these viruses, it may still provide some protection    Flu vaccine cannot prevent:   flu that is caused by a virus not covered by the vaccine, or   illnesses that look like flu but are not. It takes about 2 weeks for protection to develop after vaccination, and protection lasts through the flu season. 3. Some people should not get this vaccine    Tell the person who is giving you the vaccine:     If you have any severe, life-threatening allergies. If you ever had a life-threatening allergic reaction after a dose of flu vaccine, or have a severe allergy to any part of this vaccine, you may be advised not to get vaccinated. Most, but not all, types of flu vaccine contain a small amount of egg protein.  If you ever had Guillain-Barré Syndrome (also called GBS). Some people with a history of GBS should not get this vaccine. This should be discussed with your doctor.  If you are not feeling well. It is usually okay to get flu vaccine when you have a mild illness, but you might be asked to come back when you feel better. 4. Risks of a vaccine reaction    With any medicine, including vaccines, there is a chance of reactions. These are usually mild and go away on their own, but serious reactions are also possible. Most people who get a flu shot do not have any problems with it.      Minor problems following a flu shot include:    soreness, redness, or swelling where the shot was given     hoarseness   sore, red or itchy eyes   cough   fever   aches   headache   itching   fatigue  If these problems occur, they usually begin soon after the shot and last 1 or 2 days. More serious problems following a flu shot can include the following:     There may be a small increased risk of Guillain-Barré Syndrome (GBS) after inactivated flu vaccine. This risk has been estimated at 1 or 2 additional cases per million people vaccinated. This is much lower than the risk of severe complications from flu, which can be prevented by flu vaccine.  Young children who get the flu shot along with pneumococcal vaccine (PCV13) and/or DTaP vaccine at the same time might be slightly more likely to have a seizure caused by fever. Ask your doctor for more information. Tell your doctor if a child who is getting flu vaccine has ever had a seizure. Problems that could happen after any injected vaccine:      People sometimes faint after a medical procedure, including vaccination. Sitting or lying down for about 15 minutes can help prevent fainting, and injuries caused by a fall. Tell your doctor if you feel dizzy, or have vision changes or ringing in the ears.  Some people get severe pain in the shoulder and have difficulty moving the arm where a shot was given. This happens very rarely.  Any medication can cause a severe allergic reaction. Such reactions from a vaccine are very rare, estimated at about 1 in a million doses, and would happen within a few minutes to a few hours after the vaccination. As with any medicine, there is a very remote chance of a vaccine causing a serious injury or death. The safety of vaccines is always being monitored. For more information, visit: www.cdc.gov/vaccinesafety/    5. What if there is a serious reaction? What should I look for?  Look for anything that concerns you, such as signs of a severe allergic reaction, very high fever, or unusual behavior.     Signs of a severe allergic reaction can include hives, swelling of the face and throat, difficulty breathing, a fast heartbeat, dizziness, and weakness - usually within a few minutes to a few hours after the vaccination. What should I do?  If you think it is a severe allergic reaction or other emergency that cant wait, call 9-1-1 and get the person to the nearest hospital. Otherwise, call your doctor.  Reactions should be reported to the Vaccine Adverse Event Reporting System (VAERS). Your doctor should file this report, or you can do it yourself through  the VAERS web site at www.vaers. [facility].gov, or by calling 6-581.680.5185. VAERS does not give medical advice. 6. The National Vaccine Injury Compensation Program    The [facility] Vaccine Injury Compensation Program (VICP) is a federal program that was created to compensate people who may have been injured by certain vaccines. Persons who believe they may have been injured by a vaccine can learn about the program and about filing a claim by calling 5-104.799.6034 or visiting the [employer] website at www.[employer].gov/vaccinecompensation. There is a time limit to file a claim for compensation. 7. How can I learn more?  Ask your healthcare provider. He or she can give you the vaccine package insert or suggest other sources of information.  Call your local or state health department.  Contact the Centers for Disease Control and Prevention (CDC):  - Call 0-286.880.7157 (1-800-CDC-INFO) or  - Visit CDCs website at www.cdc.gov/flu    Vaccine Information Statement   Inactivated Influenza Vaccine   8/7/2015  42 OMAR Flanagan [id number]    Department of Health and Human Services  Centers for Disease Control and Prevention    Office Use Only

## 2018-09-25 NOTE — PROGRESS NOTES
1. Have you been to the ER, urgent care clinic since your last visit? Hospitalized since your last visit? 9/20/18 Hypotension    2. Have you seen or consulted any other health care providers outside of the [address] [place] [address] since your last visit? Include any pap smears or colon screening.  No

## 2018-10-08 DIAGNOSIS — E03.4 HYPOTHYROIDISM DUE TO ACQUIRED ATROPHY OF THYROID: Chronic | ICD-10-CM

## 2018-10-08 RX ORDER — LEVOTHYROXINE SODIUM 112 UG/1
TABLET ORAL
Qty: 90 TAB | Refills: 0 | Status: SHIPPED | OUTPATIENT
Start: 2018-10-08 | End: 2019-02-04 | Stop reason: SDUPTHER

## 2018-10-09 RX ORDER — PANTOPRAZOLE SODIUM 40 MG/1
TABLET, DELAYED RELEASE ORAL
Qty: 30 TAB | Refills: 0 | Status: SHIPPED | OUTPATIENT
Start: 2018-10-09 | End: 2018-11-16 | Stop reason: SDUPTHER

## 2018-11-16 DIAGNOSIS — Z87.19 HISTORY OF GI BLEED: ICD-10-CM

## 2018-11-18 RX ORDER — SUCRALFATE 1 G/1
TABLET ORAL
Qty: 120 TAB | Refills: 0 | Status: SHIPPED | OUTPATIENT
Start: 2018-11-18 | End: 2019-02-04 | Stop reason: SDUPTHER

## 2018-11-18 RX ORDER — PANTOPRAZOLE SODIUM 40 MG/1
TABLET, DELAYED RELEASE ORAL
Qty: 30 TAB | Refills: 0 | Status: SHIPPED | OUTPATIENT
Start: 2018-11-18 | End: 2018-12-23 | Stop reason: SDUPTHER

## 2018-11-27 ENCOUNTER — OFFICE VISIT (OUTPATIENT)
Dept: FAMILY MEDICINE CLINIC | Age: 43
End: 2018-11-27

## 2018-11-27 VITALS
SYSTOLIC BLOOD PRESSURE: 127 MMHG | WEIGHT: 217 LBS | DIASTOLIC BLOOD PRESSURE: 85 MMHG | HEIGHT: 64 IN | BODY MASS INDEX: 37.05 KG/M2 | HEART RATE: 75 BPM | OXYGEN SATURATION: 96 % | RESPIRATION RATE: 18 BRPM | TEMPERATURE: 97.1 F

## 2018-11-27 DIAGNOSIS — R05.9 COUGH: ICD-10-CM

## 2018-11-27 DIAGNOSIS — E03.4 HYPOTHYROIDISM DUE TO ACQUIRED ATROPHY OF THYROID: Chronic | ICD-10-CM

## 2018-11-27 DIAGNOSIS — E78.00 HYPERCHOLESTEREMIA: ICD-10-CM

## 2018-11-27 DIAGNOSIS — D64.9 ANEMIA, UNSPECIFIED TYPE: ICD-10-CM

## 2018-11-27 DIAGNOSIS — I10 ESSENTIAL HYPERTENSION: Primary | ICD-10-CM

## 2018-11-27 RX ORDER — CODEINE PHOSPHATE AND GUAIFENESIN 10; 100 MG/5ML; MG/5ML
5 SOLUTION ORAL
Qty: 118 ML | Refills: 0 | Status: SHIPPED | OUTPATIENT
Start: 2018-11-27 | End: 2019-05-13

## 2018-11-27 NOTE — PROGRESS NOTES
[address] [place] [address]. is a 37 y.o.  male and presents with     Chief Complaint   Patient presents with    Hypertension    Cough       Pt has been having symptoms of cough. He has has post nasal drip, whitish nasal mucoid discharge from nose. He denies any bleeding. 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9    Carotid artery occlusion with infarction ([facility] 10/2017    CVA (cerebral vascular accident) ([facility] 2016    Hypertension     Hypothyroid     Obesity     Thyroid disorder      Past Surgical History:   Procedure Laterality Date    HX OTHER SURGICAL  2/19/10    excision of lipoma, left temporal lesion, left face cyst, right face lesion, removal of 5 skin tags    HX TUMOR REMOVAL      tumor removed from head when 1years old     Current Outpatient Medications   Medication Sig    guaiFENesin-codeine (ROBITUSSIN AC) 100-10 mg/5 mL solution Take 5 mL by mouth nightly as needed for Cough. Max Daily Amount: 5 mL.  pantoprazole (PROTONIX) 40 mg tablet TAKE 1 TABLET BY MOUTH EVERY DAY    sucralfate (CARAFATE) 1 gram tablet TAKE 1 TABLET BY MOUTH FOUR TIMES DAILY    levothyroxine (SYNTHROID) 112 mcg tablet TAKE 1 TABLET BY MOUTH EVERY DAY BEFORE BREAKFAST    diclofenac (VOLTAREN) 1 % gel Apply  to affected area four (4) times daily.  venlafaxine-SR (EFFEXOR-XR) 150 mg capsule Take 1 Cap by mouth daily.  aspirin delayed-release 81 mg tablet 81 mg.    atorvastatin (LIPITOR) 80 mg tablet 80 mg.    baclofen (LIORESAL) 20 mg tablet 20 mg two (2) times a day.  ferrous sulfate 325 mg (65 mg iron) tablet 325 mg.    metoprolol succinate (TOPROL XL) 25 mg XL tablet Take 0.5 Tabs by mouth daily.  furosemide (LASIX) 20 mg tablet Take 1 Tab by mouth daily.  MARY. STOCKING,KNEE,REG,XLRG (COMP STOCKING, KNEE,REG, X-LRG) misc Wear daily or as needed    cyanocobalamin (VITAMIN B-12) 1,000 mcg tablet Take 1,000 mcg by mouth daily.  OTHER Testosterone PO      No current facility-administered medications for this visit. Health Maintenance   Topic Date Due    DTaP/Tdap/Td series (2 - Td) 06/25/2018    Influenza Age 5 to Adult  Completed     Immunization History   Administered Date(s) Administered    Influenza Vaccine (Quad) PF 09/25/2018    Pneumococcal Polysaccharide (PPSV-23) 08/23/2013     No LMP for male patient. Allergies and Intolerances: Allergies   Allergen Reactions    Chocolate [Cocoa] Nausea Only       Family History:   Family History   Problem Relation Age of Onset    Hypertension Mother     Hypertension Father     Diabetes Father     Cancer Father         colon cancer       Social History:   He  reports that  has never smoked. he has never used smokeless tobacco.  He  reports that he does not drink alcohol.             Review of Systems: pos for cough  General: negative for - chills, fatigue, fever, weight change  Psych: negative for - anxiety, depression, irritability or mood swings  ENT: negative for - headaches, hearing change, nasal congestion, oral lesions, sneezing or sore throat  Heme/ Lymph: negative for - bleeding problems, bruising, pallor or swollen lymph nodes  Endo: negative for - hot flashes, polydipsia/polyuria or temperature intolerance  Resp: negative for - cough, shortness of breath or wheezing  CV: negative for - chest pain, edema or palpitations  GI: negative for - abdominal pain, change in bowel habits, constipation, diarrhea or nausea/vomiting  : negative for - dysuria, hematuria, incontinence, pelvic pain or vulvar/vaginal symptoms  MSK: negative for - joint pain, joint swelling or muscle pain  Neuro: negative for - confusion, headaches, seizures or weakness  Derm: negative for - dry skin, hair changes, rash or skin lesion changes          Physical:   Vitals:   Vitals:    11/27/18 1137   BP: 127/85   Pulse: 75   Resp: 18   Temp: 97.1 °F (36.2 °C)   TempSrc: Oral   SpO2: 96%   Weight: 217 lb (98.4 kg)   Height: 5' 4\" (1.626 m)           Exam:   HEENT- atraumatic,normocephalic, awake, oriented, well nourished, nasal congestion +   Neck - supple,no enlarged lymph nodes, no JVD, no thyromegaly  Chest- CTA, no rhonchi, no crackles  Heart- rrr, no murmurs / gallop/rub  Abdomen- soft,BS+,NT, no hepatosplenomegaly  Ext - no c/c/edema   Neuro- no focal deficits. Power 5/5 all extremities  Skin - warm,dry, no obvious rashes. Review of Data:   LABS:   Lab Results   Component Value Date/Time    WBC 6.7 02/01/2018 03:27 PM    HGB 11.1 (L) 02/01/2018 03:27 PM    HCT 34.9 (L) 02/01/2018 03:27 PM    PLATELET 683 88/05/1476 03:27 PM     Lab Results   Component Value Date/Time    Sodium 138 08/21/2013 01:45 AM    Potassium 4.3 08/21/2013 01:45 AM    Chloride 103 08/21/2013 01:45 AM    CO2 23 08/21/2013 01:45 AM    Glucose 84 08/21/2013 01:45 AM    BUN 17 08/21/2013 01:45 AM    Creatinine 1.01 08/21/2013 01:45 AM     Lab Results   Component Value Date/Time    Cholesterol, total 136 02/01/2018 03:27 PM    HDL Cholesterol 39 (L) 02/01/2018 03:27 PM    LDL, calculated 54.8 02/01/2018 03:27 PM    Triglyceride 211 (H) 02/01/2018 03:27 PM     No results found for: GPT        Impression / Plan:        ICD-10-CM ICD-9-CM    1. Essential hypertension I10 401.9    2. Hypothyroidism due to acquired atrophy of thyroid E03.4 244.8      246.8    3. Anemia, unspecified type D64.9 285.9 CBC WITH AUTOMATED DIFF      CBC WITH AUTOMATED DIFF   4. Hypercholesteremia E78.00 272.0 LIPID PANEL      LIPID PANEL   5. Cough R05 786.2 guaiFENesin-codeine (ROBITUSSIN AC) 100-10 mg/5 mL solution     clinically Viral URI. MAURICIO - will be getting CPAP soon    Explained to patient risk benefits of the medications. Advised patient to stop meds if having any side effects. Pt verbalized understanding of the instructions.     I have discussed the diagnosis with the patient and the intended plan as seen in the above orders. The patient has received an after-visit summary and questions were answered concerning future plans. I have discussed medication side effects and warnings with the patient as well. I have reviewed the plan of care with the patient, accepted their input and they are in agreement with the treatment goals. Reviewed plan of care. Patient has provided input and agrees with goals. Follow-up Disposition:  Return in about 3 months (around 2/27/2019).     Prince Davila MD

## 2018-11-28 LAB
A-G RATIO,AGRAT: 1.6 RATIO (ref 1.1–2.6)
ALBUMIN SERPL-MCNC: 4.8 G/DL (ref 3.5–5)
ALP SERPL-CCNC: 148 U/L (ref 25–115)
ALT SERPL-CCNC: 31 U/L (ref 5–40)
ANION GAP SERPL CALC-SCNC: 14 MMOL/L
AST SERPL W P-5'-P-CCNC: 21 U/L (ref 10–37)
BASOPHILS ABS-DIF,2030: 0.2 K/UL (ref 0–0.2)
BASOPHILS C MAN (DIFF), 1068: 2 % (ref 0–2)
BILIRUB SERPL-MCNC: 0.7 MG/DL (ref 0.2–1.2)
BUN SERPL-MCNC: 10 MG/DL (ref 6–22)
CALCIUM SERPL-MCNC: 9.9 MG/DL (ref 8.4–10.4)
CHLORIDE SERPL-SCNC: 96 MMOL/L (ref 98–110)
CHOLEST SERPL-MCNC: 125 MG/DL (ref 110–200)
CO2 SERPL-SCNC: 29 MMOL/L (ref 20–32)
CORTISOL, SERUM AM,CORTA: 6 MCG/DL
CREAT SERPL-MCNC: 0.7 MG/DL (ref 0.5–1.2)
EOS ABS-DIF,2069: 0.2 K/UL (ref 0–0.5)
EOSINOPHILS C MAN (DIFF), 1067: 3 % (ref 0–6)
ERYTHROCYTE [DISTWIDTH] IN BLOOD BY AUTOMATED COUNT: 16.4 % (ref 10–15.5)
GFRAA, 66117: >60
GFRNA, 66118: >60
GLOBULIN,GLOB: 3 G/DL (ref 2–4)
GLUCOSE SERPL-MCNC: 90 MG/DL (ref 70–99)
HCT VFR BLD AUTO: 41.7 % (ref 36.6–51.9)
HDLC SERPL-MCNC: 3.5 MG/DL (ref 0–5)
HDLC SERPL-MCNC: 36 MG/DL (ref 40–59)
HGB BLD-MCNC: 12.8 G/DL (ref 13.2–17.3)
LDLC SERPL CALC-MCNC: 61 MG/DL (ref 50–99)
LYMPHOCYTES C MAN (DIFF), 1065: 24 % (ref 20–45)
LYMPHS ABS-DIF,2105: 1.9 K/UL (ref 1–4.8)
MCH RBC QN AUTO: 29 PG (ref 26–34)
MCHC RBC AUTO-ENTMCNC: 31 G/DL (ref 31–36)
MCV RBC AUTO: 93 FL (ref 80–95)
MONOCYTES ABS-DIF,2141: 0.5 K/UL (ref 0.1–1)
MONOCYTES C MAN (DIFF), 1066: 6 % (ref 3–12)
NEUTROPHILS ABS,2156: 5.2 K/UL (ref 1.8–7.7)
NEUTROPHILS C MAN (DIFF), 1064: 65 % (ref 40–75)
NORMOCHROMIC CELLAVISION, 1078: NORMAL
NORMOCYTIC CELLAVISION, 1079: NORMAL
PLATELET # BLD AUTO: 354 K/UL (ref 140–440)
PMV BLD AUTO: 11.2 FL (ref 9–13)
POTASSIUM SERPL-SCNC: 4.1 MMOL/L (ref 3.5–5.5)
PROT SERPL-MCNC: 7.8 G/DL (ref 6.4–8.3)
RBC # BLD AUTO: 4.47 M/UL (ref 3.8–5.8)
SMEAR EVAL, 1131: NORMAL
SODIUM SERPL-SCNC: 139 MMOL/L (ref 133–145)
TRIGL SERPL-MCNC: 140 MG/DL (ref 40–149)
TSH SERPL DL<=0.005 MIU/L-ACNC: 0.57 MCU/ML (ref 0.27–4.2)
URATE SERPL-MCNC: 5.7 MG/DL (ref 3.9–9)
VLDLC SERPL CALC-MCNC: 28 MG/DL (ref 8–30)
WBC # BLD AUTO: 8.1 K/UL (ref 4–11)

## 2018-12-30 RX ORDER — PANTOPRAZOLE SODIUM 40 MG/1
TABLET, DELAYED RELEASE ORAL
Qty: 30 TAB | Refills: 0 | Status: SHIPPED | OUTPATIENT
Start: 2018-12-30 | End: 2019-02-28 | Stop reason: SDUPTHER

## 2019-01-07 ENCOUNTER — OFFICE VISIT (OUTPATIENT)
Dept: FAMILY MEDICINE CLINIC | Age: 44
End: 2019-01-07

## 2019-01-07 VITALS
OXYGEN SATURATION: 99 % | BODY MASS INDEX: 35.68 KG/M2 | WEIGHT: 209 LBS | HEIGHT: 64 IN | DIASTOLIC BLOOD PRESSURE: 87 MMHG | HEART RATE: 71 BPM | RESPIRATION RATE: 16 BRPM | SYSTOLIC BLOOD PRESSURE: 119 MMHG | TEMPERATURE: 98.6 F

## 2019-01-07 DIAGNOSIS — I10 ESSENTIAL HYPERTENSION: Primary | ICD-10-CM

## 2019-01-07 DIAGNOSIS — E55.9 VITAMIN D DEFICIENCY: ICD-10-CM

## 2019-01-07 RX ORDER — MELATONIN
1000 DAILY
Qty: 30 TAB | Refills: 3 | Status: SHIPPED | OUTPATIENT
Start: 2019-01-07

## 2019-01-07 RX ORDER — HYDROCHLOROTHIAZIDE 12.5 MG/1
12.5 TABLET ORAL DAILY
Qty: 30 TAB | Refills: 3 | Status: SHIPPED | OUTPATIENT
Start: 2019-01-07 | End: 2019-03-01 | Stop reason: SDUPTHER

## 2019-01-07 NOTE — PROGRESS NOTES
[address] [place] [address]. is a 37 y.o.  male and presents with     Chief Complaint   Patient presents with    Cough    Extremity Weakness    Hypertension     Pt is accompanied by his father who reprts that he has cut down on tizanidine and backlofen. He has also cut down on sucralfate to twice daily. Pt saw endocrine  Who is doing studies for ow testosterone levels and IGF related to radidaion to his brain. Pt reports cough has improved. Father says lately blood pressure has been up, especially systolic. Father is trying to make his son more independent. Pt walks from one end of the room to other with help of cane. 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9    Carotid artery occlusion with infarction ([facility] 10/2017    CVA (cerebral vascular accident) ([facility] 2016    Hypertension     Hypothyroid     Obesity     Thyroid disorder      Past Surgical History:   Procedure Laterality Date    HX OTHER SURGICAL  2/19/10    excision of lipoma, left temporal lesion, left face cyst, right face lesion, removal of 5 skin tags    HX TUMOR REMOVAL      tumor removed from head when 1years old     Current Outpatient Medications   Medication Sig    hydroCHLOROthiazide (HYDRODIURIL) 12.5 mg tablet Take 1 Tab by mouth daily.  cholecalciferol (VITAMIN D3) 1,000 unit tablet Take 1 Tab by mouth daily.  pantoprazole (PROTONIX) 40 mg tablet TAKE 1 TABLET BY MOUTH EVERY DAY.  guaiFENesin-codeine (ROBITUSSIN AC) 100-10 mg/5 mL solution Take 5 mL by mouth nightly as needed for Cough. Max Daily Amount: 5 mL.  sucralfate (CARAFATE) 1 gram tablet TAKE 1 TABLET BY MOUTH FOUR TIMES DAILY    levothyroxine (SYNTHROID) 112 mcg tablet TAKE 1 TABLET BY MOUTH EVERY DAY BEFORE BREAKFAST    diclofenac (VOLTAREN) 1 % gel Apply  to affected area four (4) times daily.     venlafaxine-SR (EFFEXOR-XR) 150 mg capsule Take 1 Cap by mouth daily.  aspirin delayed-release 81 mg tablet 81 mg.    atorvastatin (LIPITOR) 80 mg tablet 80 mg.    baclofen (LIORESAL) 20 mg tablet 20 mg two (2) times a day.  ferrous sulfate 325 mg (65 mg iron) tablet 325 mg.    metoprolol succinate (TOPROL XL) 25 mg XL tablet Take 0.5 Tabs by mouth daily.  furosemide (LASIX) 20 mg tablet Take 1 Tab by mouth daily.  MARY. STOCKING,KNEE,REG,XLRG (COMP STOCKING, KNEE,REG, X-LRG) misc Wear daily or as needed    cyanocobalamin (VITAMIN B-12) 1,000 mcg tablet Take 1,000 mcg by mouth daily.  OTHER Testosterone PO      No current facility-administered medications for this visit. Health Maintenance   Topic Date Due    DTaP/Tdap/Td series (2 - Td) 06/25/2018    Influenza Age 5 to Adult  Completed     Immunization History   Administered Date(s) Administered    Influenza Vaccine (Quad) PF 09/25/2018    Pneumococcal Polysaccharide (PPSV-23) 08/23/2013     No LMP for male patient. Allergies and Intolerances: Allergies   Allergen Reactions    Chocolate [Cocoa] Nausea Only       Family History:   Family History   Problem Relation Age of Onset    Hypertension Mother     Hypertension Father     Diabetes Father     Cancer Father         colon cancer       Social History:   He  reports that  has never smoked. he has never used smokeless tobacco.  He  reports that he does not drink alcohol.             Review of Systems:   General: negative for - chills, fatigue, fever, weight change  Psych: negative for - anxiety, depression, irritability or mood swings  ENT: negative for - headaches, hearing change, nasal congestion, oral lesions, sneezing or sore throat  Heme/ Lymph: negative for - bleeding problems, bruising, pallor or swollen lymph nodes  Endo: negative for - hot flashes, polydipsia/polyuria or temperature intolerance  Resp: negative for - cough, shortness of breath or wheezing  CV: negative for - chest pain, edema or palpitations  GI: negative for - abdominal pain, change in bowel habits, constipation, diarrhea or nausea/vomiting  : negative for - dysuria, hematuria, incontinence, pelvic pain or vulvar/vaginal symptoms  MSK: negative for - joint pain, joint swelling or muscle pain  Neuro: negative for - confusion, headaches, seizures or weakness  Derm: negative for - dry skin, hair changes, rash or skin lesion changes          Physical:   Vitals:   Vitals:    01/07/19 1552 01/07/19 1556   BP: (!) 145/96 119/87   Pulse: 71    Resp: 16    Temp: 98.6 °F (37 °C)    TempSrc: Oral    SpO2: 99%    Weight: 209 lb (94.8 kg)    Height: 5' 4\" (1.626 m)            Exam:   HEENT- atraumatic,normocephalic, awake, oriented, well nourished  Neck - supple,no enlarged lymph nodes, no JVD, no thyromegaly  Chest- CTA, no rhonchi, no crackles  Heart- rrr, no murmurs / gallop/rub  Abdomen- soft,BS+,NT, no hepatosplenomegaly  Ext - no c/c/edema   Neuro- no focal deficits. Power 5/5 all extremities  Skin - warm,dry, no obvious rashes. Review of Data:   LABS:   Lab Results   Component Value Date/Time    WBC 8.1 11/27/2018 12:33 PM    HGB 12.8 (L) 11/27/2018 12:33 PM    HCT 41.7 11/27/2018 12:33 PM    PLATELET 129 71/05/5413 12:33 PM     Lab Results   Component Value Date/Time    Sodium 139 11/27/2018 12:33 PM    Potassium 4.1 11/27/2018 12:33 PM    Chloride 96 (L) 11/27/2018 12:33 PM    CO2 29 11/27/2018 12:33 PM    Glucose 90 11/27/2018 12:33 PM    BUN 10 11/27/2018 12:33 PM    Creatinine 0.7 11/27/2018 12:33 PM     Lab Results   Component Value Date/Time    Cholesterol, total 125 11/27/2018 12:33 PM    HDL Cholesterol 36 (L) 11/27/2018 12:33 PM    LDL, calculated 61 11/27/2018 12:33 PM    Triglyceride 140 11/27/2018 12:33 PM     No results found for: GPT        Impression / Plan:        ICD-10-CM ICD-9-CM    1. Essential hypertension I10 401.9 hydroCHLOROthiazide (HYDRODIURIL) 12.5 mg tablet   2.  Vitamin D deficiency E55.9 268.9 cholecalciferol (VITAMIN D3) 1,000 unit tablet     Low salt diet. Stroke - mild improvement per father. Cough - resolved. Low testosterone, fatigue - being wokred up by endocrine. Explained to patient risk benefits of the medications. Advised patient to stop meds if having any side effects. Pt verbalized understanding of the instructions. I have discussed the diagnosis with the patient and the intended plan as seen in the above orders. The patient has received an after-visit summary and questions were answered concerning future plans. I have discussed medication side effects and warnings with the patient as well. I have reviewed the plan of care with the patient, accepted their input and they are in agreement with the treatment goals. Reviewed plan of care. Patient has provided input and agrees with goals. Follow-up Disposition:  Return in about 3 months (around 4/7/2019).     Maryan Benedict MD

## 2019-02-04 DIAGNOSIS — Z87.19 HISTORY OF GI BLEED: ICD-10-CM

## 2019-02-04 DIAGNOSIS — E03.4 HYPOTHYROIDISM DUE TO ACQUIRED ATROPHY OF THYROID: Chronic | ICD-10-CM

## 2019-02-04 RX ORDER — LEVOTHYROXINE SODIUM 112 UG/1
TABLET ORAL
Qty: 90 TAB | Refills: 0 | Status: SHIPPED | OUTPATIENT
Start: 2019-02-04 | End: 2019-04-28 | Stop reason: SDUPTHER

## 2019-02-04 RX ORDER — SUCRALFATE 1 G/1
TABLET ORAL
Qty: 120 TAB | Refills: 0 | Status: SHIPPED | OUTPATIENT
Start: 2019-02-04 | End: 2019-04-04 | Stop reason: SDUPTHER

## 2019-02-07 ENCOUNTER — TELEPHONE (OUTPATIENT)
Dept: FAMILY MEDICINE CLINIC | Age: 44
End: 2019-02-07

## 2019-02-07 NOTE — TELEPHONE ENCOUNTER
Gabriel Rizzo with home care delivery called checking the status of certificate of medical necessity that was faxed on 2/4/19. Please advise, thank you.      Fax: [phone]

## 2019-02-11 ENCOUNTER — OFFICE VISIT (OUTPATIENT)
Dept: FAMILY MEDICINE CLINIC | Age: 44
End: 2019-02-11

## 2019-02-11 ENCOUNTER — TELEPHONE (OUTPATIENT)
Dept: FAMILY MEDICINE CLINIC | Age: 44
End: 2019-02-11

## 2019-02-11 VITALS
DIASTOLIC BLOOD PRESSURE: 78 MMHG | SYSTOLIC BLOOD PRESSURE: 124 MMHG | OXYGEN SATURATION: 98 % | RESPIRATION RATE: 15 BRPM | BODY MASS INDEX: 35.17 KG/M2 | HEART RATE: 65 BPM | TEMPERATURE: 98.8 F | WEIGHT: 206 LBS | HEIGHT: 64 IN

## 2019-02-11 DIAGNOSIS — N48.5 PENILE ULCER: ICD-10-CM

## 2019-02-11 DIAGNOSIS — I10 ESSENTIAL HYPERTENSION: Primary | Chronic | ICD-10-CM

## 2019-02-11 DIAGNOSIS — G89.29 CHRONIC INTRACTABLE HEADACHE, UNSPECIFIED HEADACHE TYPE: ICD-10-CM

## 2019-02-11 DIAGNOSIS — R51.9 CHRONIC INTRACTABLE HEADACHE, UNSPECIFIED HEADACHE TYPE: ICD-10-CM

## 2019-02-11 DIAGNOSIS — R09.81 SINUS CONGESTION: ICD-10-CM

## 2019-02-11 DIAGNOSIS — I66.01 STENOSIS OF RIGHT MIDDLE CEREBRAL ARTERY: ICD-10-CM

## 2019-02-11 RX ORDER — CEPHALEXIN 500 MG/1
500 CAPSULE ORAL 4 TIMES DAILY
Qty: 40 CAP | Refills: 0 | Status: SHIPPED | OUTPATIENT
Start: 2019-02-11 | End: 2019-02-21

## 2019-02-11 RX ORDER — FLUTICASONE PROPIONATE 50 MCG
SPRAY, SUSPENSION (ML) NASAL
Qty: 1 BOTTLE | Refills: 3 | Status: SHIPPED | OUTPATIENT
Start: 2019-02-11 | End: 2021-01-20

## 2019-02-11 NOTE — TELEPHONE ENCOUNTER
Pt. Stated they did not receive certificate of medical necessity for incontinent supplies. Please send to alternate fax st [phone].

## 2019-02-11 NOTE — PROGRESS NOTES
[address] [place] [address]. is a 37 y.o.  male and presents with     Chief Complaint   Patient presents with    Headache     left side, and back side of head, on and off for a month, but more freuquently in the past week.  Skin Ulcer     Pt is having headaches on the left side , goes to the back of his head. Pt deneis any new eye symptoms  Pt denies any allergy symptoms or symptoms of sinusitis. Pt has h/o stroke and carotid artery occlsuion . Father is worried about occlsuion on the left side. Pt is able to walk a little bit . He takes a few steps  Pt aslo has lesion on the prepuce. He does not remember if he had any bit or if he was scratching it. Pt is not sexually active nad has no h/o STDS or herpes in the past.        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9    [facility]otid [facility] occlusion with [facility] 10/2017    CVA (cerebral vascular accident) ([facility] 2016    Hypertension     Hypothyroid     Obesity     Thyroid disorder      Past Surgical History:   Procedure Laterality Date    HX OTHER SURGICAL  2/19/10    excision of lipoma, left temporal lesion, left face cyst, right face lesion, removal of 5 skin tags    HX TUMOR REMOVAL      tumor removed from head when 1years old     Current Outpatient Medications   Medication Sig    fluticasone (FLONASE) 50 mcg/actuation nasal spray One spray each nostril ldaly    cephALEXin (KEFLEX) 500 mg capsule Take 1 Cap by mouth four (4) times daily for 10 days.  levothyroxine (SYNTHROID) 112 mcg tablet TAKE 1 TABLET BY MOUTH EVERY DAY BEFORE BREAKFAST    sucralfate (CARAFATE) 1 gram tablet TAKE 1 TABLET BY MOUTH FOUR TIMES DAILY.  hydroCHLOROthiazide (HYDRODIURIL) 12.5 mg tablet Take 1 Tab by mouth daily.  cholecalciferol (VITAMIN D3) 1,000 unit tablet Take 1 Tab by mouth daily.     pantoprazole (PROTONIX) 40 mg tablet TAKE 1 TABLET BY MOUTH EVERY DAY.  guaiFENesin-codeine (ROBITUSSIN AC) 100-10 mg/5 mL solution Take 5 mL by mouth nightly as needed for Cough. Max Daily Amount: 5 mL.  diclofenac (VOLTAREN) 1 % gel Apply  to affected area four (4) times daily.  venlafaxine-SR (EFFEXOR-XR) 150 mg capsule Take 1 Cap by mouth daily.  aspirin delayed-release 81 mg tablet 81 mg.    atorvastatin (LIPITOR) 80 mg tablet 80 mg.    baclofen (LIORESAL) 20 mg tablet 20 mg two (2) times a day.  ferrous sulfate 325 mg (65 mg iron) tablet 325 mg.    metoprolol succinate (TOPROL XL) 25 mg XL tablet Take 0.5 Tabs by mouth daily.  furosemide (LASIX) 20 mg tablet Take 1 Tab by mouth daily.  MARY. STOCKING,KNEE,REG,XLRG (COMP STOCKING, KNEE,REG, X-LRG) misc Wear daily or as needed    cyanocobalamin (VITAMIN B-12) 1,000 mcg tablet Take 1,000 mcg by mouth daily.  OTHER Testosterone PO      No current facility-administered medications for this visit. Health Maintenance   Topic Date Due    DTaP/Tdap/Td series (2 - Td) 06/25/2018    Influenza Age 5 to Adult  Completed     Immunization History   Administered Date(s) Administered    Influenza Vaccine (Quad) PF 09/25/2018    Pneumococcal Polysaccharide (PPSV-23) 08/23/2013     No LMP for male patient. Allergies and Intolerances: Allergies   Allergen Reactions    Chocolate [Cocoa] Nausea Only       Family History:   Family History   Problem Relation Age of Onset    Hypertension Mother     Hypertension Father     Diabetes Father     Cancer Father         colon cancer       Social History:   He  reports that  has never smoked. he has never used smokeless tobacco.  He  reports that he does not drink alcohol.             Review of Systems: pos for headaches  General: negative for - chills, fatigue, fever, weight change  Psych: negative for - anxiety, depression, irritability or mood swings  ENT: negative for - headaches, hearing change, nasal congestion, oral lesions, sneezing or sore throat  Heme/ Lymph: negative for - bleeding problems, bruising, pallor or swollen lymph nodes  Endo: negative for - hot flashes, polydipsia/polyuria or temperature intolerance  Resp: negative for - cough, shortness of breath or wheezing  CV: negative for - chest pain, edema or palpitations  GI: negative for - abdominal pain, change in bowel habits, constipation, diarrhea or nausea/vomiting  : negative for - dysuria, hematuria, incontinence, pelvic pain or vulvar/vaginal symptoms  MSK: negative for - joint pain, joint swelling or muscle pain  Neuro: negative for - confusion, headaches, seizures or weakness  Derm: negative for - dry skin, hair changes, rash or skin lesion changes, pos for lesion on the foreskin          Physical:   Vitals:   Vitals:    02/11/19 0813   BP: 124/78   Pulse: 65   Resp: 15   Temp: 98.8 °F (37.1 °C)   TempSrc: Oral   SpO2: 98%   Weight: 206 lb (93.4 kg)   Height: 5' 4\" (1.626 m)           Exam:   HEENT- atraumatic,normocephalic, awake, oriented, well nourished, left nasal congestion  Neck - supple,no enlarged lymph nodes, no JVD, no thyromegaly  Chest- CTA, no rhonchi, no crackles  Heart- rrr, no murmurs / gallop/rub  Abdomen- soft,BS+,NT, no hepatosplenomegaly  Ext - no c/c/edema   Neuro- left sided weaknes  Skin - warm,dry, no obvious rashes.  - small ulcer on the foreskin of the penis.         Review of Data:   LABS:   Lab Results   Component Value Date/Time    WBC 8.1 11/27/2018 12:33 PM    HGB 12.8 (L) 11/27/2018 12:33 PM    HCT 41.7 11/27/2018 12:33 PM    PLATELET 325 25/16/7792 12:33 PM     Lab Results   Component Value Date/Time    Sodium 139 11/27/2018 12:33 PM    Potassium 4.1 11/27/2018 12:33 PM    Chloride 96 (L) 11/27/2018 12:33 PM    CO2 29 11/27/2018 12:33 PM    Glucose 90 11/27/2018 12:33 PM    BUN 10 11/27/2018 12:33 PM    Creatinine 0.7 11/27/2018 12:33 PM     Lab Results   Component Value Date/Time    Cholesterol, total 125 11/27/2018 12:33 PM    HDL Cholesterol 36 (L) 11/27/2018 12:33 PM    LDL, calculated 61 11/27/2018 12:33 PM    Triglyceride 140 11/27/2018 12:33 PM     No results found for: GPT        Impression / Plan:        ICD-10-CM ICD-9-CM    1. Essential hypertension I10 401.9 MRA BRAIN W WO CONT   2. Stenosis of right middle cerebral artery I66.01 437.0 MRA BRAIN W WO CONT   3. Chronic intractable headache, unspecified headache type R51 784.0 MRA BRAIN W WO CONT   4. Sinus congestion R09.81 478.19 fluticasone (FLONASE) 50 mcg/actuation nasal spray   5. Penile ulcer N48.5 607.89 cephALEXin (KEFLEX) 500 mg capsule     H/o Stroke - with residual left sided weakness, uses a cane and needs help from his father who is also his care taker. HTN - stable    May need to see Neurology of headaches persist.      Explained to patient risk benefits of the medications. Advised patient to stop meds if having any side effects. Pt verbalized understanding of the instructions. I have discussed the diagnosis with the patient and the intended plan as seen in the above orders. The patient has received an after-visit summary and questions were answered concerning future plans. I have discussed medication side effects and warnings with the patient as well. I have reviewed the plan of care with the patient, accepted their input and they are in agreement with the treatment goals. Reviewed plan of care. Patient has provided input and agrees with goals. Follow-up Disposition:  Return in about 3 months (around 5/11/2019).     Anil Orellana MD

## 2019-02-11 NOTE — PROGRESS NOTES
1. Have you been to the ER, urgent care clinic since your last visit? Hospitalized since your last visit? No    2. Have you seen or consulted any other health care providers outside of the [address] [place] [address] since your last visit? Include any pap smears or colon screening.  No

## 2019-02-12 NOTE — TELEPHONE ENCOUNTER
Received fax requested date to be updated from [facility]. Date corrected and fax back. This encounter will be closed.

## 2019-02-22 ENCOUNTER — HOSPITAL ENCOUNTER (OUTPATIENT)
Dept: MRI IMAGING | Age: 44
Discharge: HOME | End: 2019-02-22
Attending: INTERNAL MEDICINE
Payer: MEDICAID

## 2019-02-22 DIAGNOSIS — I66.01 STENOSIS OF RIGHT MIDDLE CEREBRAL ARTERY: ICD-10-CM

## 2019-02-22 DIAGNOSIS — I10 ESSENTIAL HYPERTENSION: Chronic | ICD-10-CM

## 2019-02-22 DIAGNOSIS — G89.29 CHRONIC INTRACTABLE HEADACHE, UNSPECIFIED HEADACHE TYPE: ICD-10-CM

## 2019-02-22 DIAGNOSIS — R51.9 CHRONIC INTRACTABLE HEADACHE, UNSPECIFIED HEADACHE TYPE: ICD-10-CM

## 2019-02-22 PROCEDURE — 70544 MR ANGIOGRAPHY HEAD W/O DYE: CPT

## 2019-02-24 ENCOUNTER — TELEPHONE (OUTPATIENT)
Dept: FAMILY MEDICINE CLINIC | Age: 44
End: 2019-02-24

## 2019-02-24 DIAGNOSIS — I65.21 STENOSIS OF RIGHT INTERNAL CAROTID ARTERY: Primary | ICD-10-CM

## 2019-02-24 DIAGNOSIS — I66.01 STENOSIS OF RIGHT MIDDLE CEREBRAL ARTERY: ICD-10-CM

## 2019-02-24 DIAGNOSIS — I10 ESSENTIAL HYPERTENSION: Chronic | ICD-10-CM

## 2019-02-25 DIAGNOSIS — G44.011 INTRACTABLE EPISODIC CLUSTER HEADACHE: Primary | ICD-10-CM

## 2019-02-25 DIAGNOSIS — Z86.73 HISTORY OF STROKE: ICD-10-CM

## 2019-02-25 DIAGNOSIS — C72.30 GLIOMA OF OPTIC NERVE (HCC): ICD-10-CM

## 2019-02-25 NOTE — TELEPHONE ENCOUNTER
MRA brain shows  Old changes seen on previosu MRI from [facility]    However I will go ahead and refer pt to Dr Deepali Byrd and get his opinion. 1. Findings of right ICA occlusion with very poor intracranial collateral flow  within diminutive right MCA and KENTON branches. Similar findings described on  prior outside MRA (images unavailable for direct review).     2. Lengthy at least moderate stenosis involving left carotid siphon throughout  the cavernous segment. Similar findings also described on prior outside MRA.     3. Findings suggesting occluded right PCOM along its ICA origin with short  segment patency at its PCA junction. Adjacent PCOM/PCA moderate narrowing.     4. No high-grade vertebrobasilar stenosis.

## 2019-02-28 DIAGNOSIS — F32.89 OTHER DEPRESSION: ICD-10-CM

## 2019-02-28 RX ORDER — PANTOPRAZOLE SODIUM 40 MG/1
TABLET, DELAYED RELEASE ORAL
Qty: 30 TAB | Refills: 0 | Status: SHIPPED | OUTPATIENT
Start: 2019-02-28 | End: 2019-03-01 | Stop reason: SDUPTHER

## 2019-02-28 RX ORDER — METOPROLOL SUCCINATE 25 MG/1
TABLET, EXTENDED RELEASE ORAL
Qty: 30 TAB | Refills: 0 | Status: SHIPPED | OUTPATIENT
Start: 2019-02-28 | End: 2019-03-01 | Stop reason: SDUPTHER

## 2019-02-28 RX ORDER — VENLAFAXINE HYDROCHLORIDE 150 MG/1
CAPSULE, EXTENDED RELEASE ORAL
Qty: 30 CAP | Refills: 0 | Status: SHIPPED | OUTPATIENT
Start: 2019-02-28 | End: 2019-03-01 | Stop reason: SDUPTHER

## 2019-03-01 ENCOUNTER — OFFICE VISIT (OUTPATIENT)
Dept: CARDIOLOGY CLINIC | Age: 44
End: 2019-03-01

## 2019-03-01 VITALS
OXYGEN SATURATION: 99 % | BODY MASS INDEX: 35 KG/M2 | SYSTOLIC BLOOD PRESSURE: 139 MMHG | WEIGHT: 205 LBS | HEART RATE: 84 BPM | DIASTOLIC BLOOD PRESSURE: 87 MMHG | HEIGHT: 64 IN

## 2019-03-01 DIAGNOSIS — I10 ESSENTIAL HYPERTENSION: ICD-10-CM

## 2019-03-01 RX ORDER — ASPIRIN 81 MG/1
81 TABLET ORAL DAILY
Qty: 90 TAB | Refills: 3 | Status: SHIPPED | OUTPATIENT
Start: 2019-03-01 | End: 2021-01-20

## 2019-03-01 RX ORDER — VENLAFAXINE 100 MG/1
100 TABLET ORAL 3 TIMES DAILY
COMMUNITY
End: 2019-05-13 | Stop reason: SDUPTHER

## 2019-03-01 RX ORDER — METOPROLOL SUCCINATE 25 MG/1
TABLET, EXTENDED RELEASE ORAL
Qty: 30 TAB | Refills: 11 | Status: SHIPPED | OUTPATIENT
Start: 2019-03-01 | End: 2019-06-27 | Stop reason: SDUPTHER

## 2019-03-01 RX ORDER — ATORVASTATIN CALCIUM 80 MG/1
80 TABLET, FILM COATED ORAL DAILY
Qty: 90 TAB | Refills: 3 | Status: SHIPPED | OUTPATIENT
Start: 2019-03-01 | End: 2019-12-24

## 2019-03-01 RX ORDER — OMEPRAZOLE 20 MG/1
CAPSULE, DELAYED RELEASE ORAL
COMMUNITY
End: 2019-03-01 | Stop reason: SDUPTHER

## 2019-03-01 RX ORDER — HYDROCHLOROTHIAZIDE 12.5 MG/1
12.5 TABLET ORAL DAILY
Qty: 30 TAB | Refills: 3 | Status: SHIPPED | OUTPATIENT
Start: 2019-03-01 | End: 2019-06-24 | Stop reason: SDUPTHER

## 2019-03-01 RX ORDER — DOXYCYCLINE 100 MG/1
CAPSULE ORAL
COMMUNITY
Start: 2019-02-27 | End: 2019-04-29

## 2019-03-01 RX ORDER — OMEPRAZOLE 20 MG/1
20 CAPSULE, DELAYED RELEASE ORAL DAILY
Qty: 30 CAP | Refills: 11 | Status: SHIPPED | OUTPATIENT
Start: 2019-03-01 | End: 2019-04-15

## 2019-03-01 RX ORDER — VENLAFAXINE 100 MG/1
100 TABLET ORAL
COMMUNITY
End: 2019-05-13 | Stop reason: SDUPTHER

## 2019-03-01 NOTE — PROGRESS NOTES
1. Have you been to the ER, urgent care clinic since your last visit? Hospitalized since your last visit? No    2. Have you seen or consulted any other health care providers outside of the [address] [place] [address] since your last visit? Include any pap smears or colon screening.  No

## 2019-03-01 NOTE — PROGRESS NOTES
[facility]  Mr. Leafy Jeans is a 37 y.o.male with multiple medical problems, including carotid artery occlusion with right sided CVA in 2016, hypertension, obesity, GI bleed,  sleep apnea and multiple other medical problems.     Patient is here today for follow-up appointment. He is seeing oncologist since then in April 2018, patient was diagnosed with gastrointestinal stromal tumor of jejunum and he has to undergo surgery  He denies any chest pain or chest tightness. He denies any palpitation, presyncope or syncope. He is taking all his medications regularly since the surgery, he has had no more GI bleed  Denies any nausea, vomiting, abdominal pain, fever, chills, sputum production. 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9    Carotid artery occlusion with infarction ([facility] [address] 10/2017    CVA (cerebral vascular accident) ([facility] [address] 2016    Hypertension     Hypothyroid     Obesity     Thyroid disorder          Past Surgical History:   Procedure Laterality Date    HX OTHER SURGICAL  2/19/10    excision of lipoma, left temporal lesion, left face cyst, right face lesion, removal of 5 skin tags    HX TUMOR REMOVAL      tumor removed from head when 1years old       Current Outpatient Medications   Medication Sig    doxycycline (VIBRAMYCIN) 100 mg capsule     omeprazole (PRILOSEC) 20 mg capsule Take  by mouth.  venlafaxine (EFFEXOR) 100 mg tablet Take 100 mg by mouth.  venlafaxine (EFFEXOR) 100 mg tablet Take 100 mg by mouth three (3) times daily.     metoprolol succinate (TOPROL-XL) 25 mg XL tablet TAKE 1/2 TABLET BY MOUTH EVERY DAY    fluticasone (FLONASE) 50 mcg/actuation nasal spray One spray each nostril ldaly    levothyroxine (SYNTHROID) 112 mcg tablet TAKE 1 TABLET BY MOUTH EVERY DAY BEFORE BREAKFAST    sucralfate (CARAFATE) 1 gram tablet TAKE 1 TABLET BY MOUTH FOUR TIMES DAILY. (Patient taking differently: BID)    hydroCHLOROthiazide (HYDRODIURIL) 12.5 mg tablet Take 1 Tab by mouth daily.  cholecalciferol (VITAMIN D3) 1,000 unit tablet Take 1 Tab by mouth daily.  guaiFENesin-codeine (ROBITUSSIN AC) 100-10 mg/5 mL solution Take 5 mL by mouth nightly as needed for Cough. Max Daily Amount: 5 mL.  diclofenac (VOLTAREN) 1 % gel Apply  to affected area four (4) times daily.  aspirin delayed-release 81 mg tablet 81 mg.    atorvastatin (LIPITOR) 80 mg tablet 80 mg.    baclofen (LIORESAL) 20 mg tablet 20 mg two (2) times a day.  MARY. STOCKING,KNEE,REG,XLRG (COMP STOCKING, KNEE,REG, X-LRG) misc Wear daily or as needed    cyanocobalamin (VITAMIN B-12) 1,000 mcg tablet Take 1,000 mcg by mouth daily.  OTHER Testosterone PO      No current facility-administered medications for this visit. Allergies and Sensitivities:  Allergies   Allergen Reactions    Chocolate [Cocoa] Nausea Only       Family History:  Family History   Problem Relation Age of Onset    Hypertension Mother     Hypertension Father     Diabetes Father     Cancer Father         colon cancer       Social History:  Social History     Tobacco Use    Smoking status: Never Smoker    Smokeless tobacco: Never Used   Substance Use Topics    Alcohol use: No    Drug use: No     He  reports that  has never smoked. he has never used smokeless tobacco.  He  reports that he does not drink alcohol. Review of Systems:  Cardiac symptoms as noted above in HPI. All others negative. Denies fatigue, malaise, skin rash, joint pain, blurring vision, photophobia, neck pain, hemoptysis, chronic cough, nausea, vomiting, hematuria, burning micturition, BRBPR, chronic headaches.     Physical Exam:  BP Readings from Last 3 Encounters:   03/01/19 139/87   02/11/19 124/78   01/07/19 119/87         Pulse Readings from Last 3 Encounters:   03/01/19 84   02/11/19 65   01/07/19 71          Wt Readings from Last 3 Encounters:   03/01/19 205 lb (93 kg)   02/11/19 206 lb (93.4 kg)   01/07/19 209 lb (94.8 kg)       Constitutional: Oriented to person, place, and time. HENT: Head: Normocephalic and atraumatic. Neck: No JVD present. Carotid bruit is not appreciated. Cardiovascular: Regular rhythm. No murmur, gallop or rubs appreciated  Lung: Breath sounds normal. No respiratory distress. No ronchi or rales appreciated  Abdominal: No tenderness. No rebound and no guarding. Musculoskeletal: There is no lower extremity edema. No cynosis    Review of Data  LABS:   Lab Results   Component Value Date/Time    Sodium 139 11/27/2018 12:33 PM    Potassium 4.1 11/27/2018 12:33 PM    Chloride 96 (L) 11/27/2018 12:33 PM    CO2 29 11/27/2018 12:33 PM    Glucose 90 11/27/2018 12:33 PM    BUN 10 11/27/2018 12:33 PM    Creatinine 0.7 11/27/2018 12:33 PM     Lipids Latest Ref Rng & Units 11/27/2018 2/1/2018   Chol, Total 110 - 200 mg/dL 125 136   HDL 40 - 59 mg/dL 36(L) 39(L)   LDL 50 - 99 mg/dL 61 54.8   Trig 40 - 149 mg/dL 140 211(H)   Chol/HDL Ratio 0 - 5.0   - 3.5   Some recent data might be hidden     Lab Results   Component Value Date/Time    ALT (SGPT) 31 11/27/2018 12:33 PM     No results found for: HBA1C, HGBE8, WFP8WBGG, QEP3CTLM, SWO4FNCJ    EKG    ECHO (09/17)  NORMAL HYPERDYNAMIC GLOBAL LEFT VENTRICULAR SYSTOLIC FUNCTION. EJECTION FRACTION OF 75%. MILD DIASTOLIC DYSFUNCTION. MODERATE TO SEVERE LEFT VENTRICULAR HYPERTROPHY. OUTFLOW TRACT GRADIENT OF 42MMHG, PATIENT UNABLE TO VALSALVA. NO HEMODYNAMICALLY SIGNIFICANT VALVULAR PATHOLOGY. INSUFFICIENT TRICUSPID REGURGITATION FOR RVSP. BUBBLE STUDY INADEQUTE TO RULE OUT R TO L SHUNTING. NO PREVIOUS REPORT FOR COMPARISON. IMPRESSION & PLAN:      Hypertension:  BP pressure is stable. Continue same    Hypothyroidism:  Defer management to PCP, he is currently on Synthroid.    Hyperlipidemia:  He is currently on Atorvastatin 80 mg daily. LDL is at goal.  Continue same    Currently he does not report any symptoms to suggest angina or heart failure. I recommend to continue with clinical observation    He can follow up in 12 mos, or sooner as symptoms dictate. Importance of diet  was discussed with patient.

## 2019-03-05 ENCOUNTER — HOSPITAL ENCOUNTER (OUTPATIENT)
Dept: MRI IMAGING | Age: 44
Discharge: HOME | End: 2019-03-05
Attending: INTERNAL MEDICINE

## 2019-03-05 VITALS — BODY MASS INDEX: 35.19 KG/M2 | WEIGHT: 205 LBS

## 2019-03-05 DIAGNOSIS — Z86.73 HISTORY OF STROKE: ICD-10-CM

## 2019-03-05 DIAGNOSIS — G44.011 INTRACTABLE EPISODIC CLUSTER HEADACHE: ICD-10-CM

## 2019-03-05 DIAGNOSIS — C72.30 GLIOMA OF OPTIC NERVE (HCC): ICD-10-CM

## 2019-04-02 DIAGNOSIS — F32.89 OTHER DEPRESSION: ICD-10-CM

## 2019-04-02 RX ORDER — VENLAFAXINE HYDROCHLORIDE 150 MG/1
CAPSULE, EXTENDED RELEASE ORAL
Qty: 30 CAP | Refills: 0 | Status: SHIPPED | OUTPATIENT
Start: 2019-04-02 | End: 2019-04-28 | Stop reason: SDUPTHER

## 2019-04-04 DIAGNOSIS — Z87.19 HISTORY OF GI BLEED: ICD-10-CM

## 2019-04-04 RX ORDER — SUCRALFATE 1 G/1
TABLET ORAL
Qty: 120 TAB | Refills: 0 | Status: SHIPPED | OUTPATIENT
Start: 2019-04-04 | End: 2019-04-28 | Stop reason: SDUPTHER

## 2019-04-10 ENCOUNTER — HOSPITAL ENCOUNTER (OUTPATIENT)
Dept: MRI IMAGING | Age: 44
Discharge: HOME | End: 2019-04-10
Attending: INTERNAL MEDICINE
Payer: MEDICARE

## 2019-04-10 VITALS — BODY MASS INDEX: 35.19 KG/M2 | WEIGHT: 205 LBS

## 2019-04-10 LAB — CREAT UR-MCNC: 0.7 MG/DL (ref 0.6–1.3)

## 2019-04-10 PROCEDURE — 70553 MRI BRAIN STEM W/O & W/DYE: CPT

## 2019-04-10 PROCEDURE — 82565 ASSAY OF CREATININE: CPT

## 2019-04-10 PROCEDURE — A9575 INJ GADOTERATE MEGLUMI 0.1ML: HCPCS | Performed by: INTERNAL MEDICINE

## 2019-04-10 PROCEDURE — 74011636320 HC RX REV CODE- 636/320: Performed by: INTERNAL MEDICINE

## 2019-04-10 RX ADMIN — GADOTERATE MEGLUMINE 20 ML: 376.9 INJECTION INTRAVENOUS at 13:13

## 2019-04-11 NOTE — PROGRESS NOTES
Will ask pt to make appt with new PCP to discuss MRI brain findings, Nothing acute on MRI.  MRI could be also faxed to pts Neurologist .

## 2019-04-15 RX ORDER — PANTOPRAZOLE SODIUM 40 MG/1
TABLET, DELAYED RELEASE ORAL
Qty: 30 TAB | Refills: 0 | Status: SHIPPED | OUTPATIENT
Start: 2019-04-15 | End: 2019-05-13 | Stop reason: SDUPTHER

## 2019-04-28 DIAGNOSIS — Z87.19 HISTORY OF GI BLEED: ICD-10-CM

## 2019-04-28 DIAGNOSIS — F32.89 OTHER DEPRESSION: ICD-10-CM

## 2019-04-28 DIAGNOSIS — E03.4 HYPOTHYROIDISM DUE TO ACQUIRED ATROPHY OF THYROID: Chronic | ICD-10-CM

## 2019-04-29 RX ORDER — LEVOTHYROXINE SODIUM 112 UG/1
TABLET ORAL
Qty: 90 TAB | Refills: 0 | Status: SHIPPED | OUTPATIENT
Start: 2019-04-29 | End: 2019-08-20 | Stop reason: SDUPTHER

## 2019-04-29 RX ORDER — VENLAFAXINE HYDROCHLORIDE 150 MG/1
CAPSULE, EXTENDED RELEASE ORAL
Qty: 30 CAP | Refills: 0 | Status: SHIPPED | OUTPATIENT
Start: 2019-04-29 | End: 2019-05-13 | Stop reason: SDUPTHER

## 2019-04-29 RX ORDER — SUCRALFATE 1 G/1
TABLET ORAL
Qty: 120 TAB | Refills: 0 | Status: SHIPPED | OUTPATIENT
Start: 2019-04-29 | End: 2019-05-13

## 2019-05-13 ENCOUNTER — HOME HEALTH ADMISSION (OUTPATIENT)
Dept: HOME HEALTH SERVICES | Facility: HOME HEALTH | Age: 44
End: 2019-05-13
Payer: MEDICARE

## 2019-05-13 ENCOUNTER — OFFICE VISIT (OUTPATIENT)
Dept: FAMILY MEDICINE CLINIC | Age: 44
End: 2019-05-13

## 2019-05-13 VITALS
HEIGHT: 64 IN | TEMPERATURE: 98.3 F | OXYGEN SATURATION: 96 % | DIASTOLIC BLOOD PRESSURE: 81 MMHG | WEIGHT: 202 LBS | HEART RATE: 70 BPM | SYSTOLIC BLOOD PRESSURE: 116 MMHG | RESPIRATION RATE: 15 BRPM | BODY MASS INDEX: 34.49 KG/M2

## 2019-05-13 DIAGNOSIS — Z85.09 HISTORY OF GASTROINTESTINAL STROMAL TUMOR (GIST): ICD-10-CM

## 2019-05-13 DIAGNOSIS — F33.9 RECURRENT DEPRESSION (HCC): ICD-10-CM

## 2019-05-13 DIAGNOSIS — E03.4 HYPOTHYROIDISM DUE TO ACQUIRED ATROPHY OF THYROID: Chronic | ICD-10-CM

## 2019-05-13 DIAGNOSIS — Z00.00 WELCOME TO MEDICARE PREVENTIVE VISIT: ICD-10-CM

## 2019-05-13 DIAGNOSIS — I65.21 STENOSIS OF RIGHT INTERNAL CAROTID ARTERY: ICD-10-CM

## 2019-05-13 DIAGNOSIS — Q85.01 NEUROFIBROMATOSIS, TYPE 1 (HCC): ICD-10-CM

## 2019-05-13 DIAGNOSIS — G47.33 OSA ON CPAP: ICD-10-CM

## 2019-05-13 DIAGNOSIS — Z99.89 OSA ON CPAP: ICD-10-CM

## 2019-05-13 DIAGNOSIS — Z00.00 ENCOUNTER FOR MEDICARE ANNUAL WELLNESS EXAM: Primary | ICD-10-CM

## 2019-05-13 DIAGNOSIS — I69.359 HEMIPLEGIA AS LATE EFFECT OF CEREBROVASCULAR ACCIDENT (CVA) (HCC): ICD-10-CM

## 2019-05-13 DIAGNOSIS — L29.9 GENERALIZED PRURITUS: ICD-10-CM

## 2019-05-13 DIAGNOSIS — E78.00 HYPERCHOLESTEREMIA: ICD-10-CM

## 2019-05-13 DIAGNOSIS — K21.9 GASTROESOPHAGEAL REFLUX DISEASE, ESOPHAGITIS PRESENCE NOT SPECIFIED: ICD-10-CM

## 2019-05-13 DIAGNOSIS — F32.89 OTHER DEPRESSION: ICD-10-CM

## 2019-05-13 DIAGNOSIS — C72.30 GLIOMA OF OPTIC NERVE (HCC): ICD-10-CM

## 2019-05-13 DIAGNOSIS — I10 ESSENTIAL HYPERTENSION: Chronic | ICD-10-CM

## 2019-05-13 RX ORDER — BUPROPION HYDROCHLORIDE 75 MG/1
75 TABLET ORAL 2 TIMES DAILY
Qty: 60 TAB | Refills: 2 | Status: SHIPPED | OUTPATIENT
Start: 2019-05-13 | End: 2019-11-07 | Stop reason: SINTOL

## 2019-05-13 RX ORDER — PANTOPRAZOLE SODIUM 40 MG/1
TABLET, DELAYED RELEASE ORAL
Qty: 90 TAB | Refills: 3 | Status: SHIPPED | OUTPATIENT
Start: 2019-05-13 | End: 2019-11-07

## 2019-05-13 RX ORDER — VENLAFAXINE HYDROCHLORIDE 150 MG/1
CAPSULE, EXTENDED RELEASE ORAL
Qty: 90 CAP | Refills: 3 | Status: SHIPPED | OUTPATIENT
Start: 2019-05-13 | End: 2020-04-20

## 2019-05-13 NOTE — PROGRESS NOTES
1. Have you been to the ER, urgent care clinic since your last visit? Hospitalized since your last visit? No 
 
2. Have you seen or consulted any other health care providers outside of the [address] [place] [address] since your last visit? Include any pap smears or colon screening.  No

## 2019-05-13 NOTE — PATIENT INSTRUCTIONS
Medicare Wellness Visit, Male The best way to live healthy is to have a lifestyle where you eat a well-balanced diet, exercise regularly, limit alcohol use, and quit all forms of tobacco/nicotine, if applicable. Regular preventive services are another way to keep healthy. Preventive services (vaccines, screening tests, monitoring & exams) can help personalize your care plan, which helps you manage your own care. Screening tests can find health problems at the earliest stages, when they are easiest to treat. [facility] follows the current, evidence-based guidelines published by the [facility] ([facility]) when recommending preventive services for our patients. Because we follow these guidelines, sometimes recommendations change over time as research supports it. (For example, a prostate screening blood test is no longer routinely recommended for men with no symptoms.) Of course, you and your doctor may decide to screen more often for some diseases, based on your risk and co-morbidities (chronic disease you are already diagnosed with). Preventive services for you include: - Medicare offers their members a free annual wellness visit, which is time for you and your primary care provider to discuss and plan for your preventive service needs. Take advantage of this benefit every year! 
-All adults over age 72 should receive the recommended pneumonia vaccines. Current USPSTF guidelines recommend a series of two vaccines for the best pneumonia protection.  
-All adults should have a flu vaccine yearly and an ECG.  All adults age 61 and older should receive a shingles vaccine once in their lifetime.   
-All adults age 38-68 who are overweight should have a diabetes screening test once every three years.  
-Other screening tests & preventive services for persons with diabetes include: an eye exam to screen for diabetic retinopathy, a kidney function test, a foot exam, and stricter control over your cholesterol.  
-Cardiovascular screening for adults with routine risk involves an electrocardiogram (ECG) at intervals determined by the provider.  
-Colorectal cancer screening should be done for adults age 54-65 with no increased risk factors for colorectal cancer. There are a number of acceptable methods of screening for this type of cancer. Each test has its own benefits and drawbacks. Discuss with your provider what is most appropriate for you during your annual wellness visit. The different tests include: colonoscopy (considered the best screening method), a fecal occult blood test, a fecal DNA test, and sigmoidoscopy. 
-All adults born between [facility] should be screened once for Hepatitis C. 
-An Abdominal Aortic Aneurysm (AAA) Screening is recommended for men age 73-68 who has ever smoked in their lifetime. Here is a list of your current Health Maintenance items (your personalized list of preventive services) with a due date: 
Health Maintenance Due Topic Date Due  
 DTaP/Tdap/Td  (2 - Td) 06/25/2018 [address] [place] [address] Annual Well Visit  03/01/2019 Preventing Falls: Care Instructions Your Care Instructions Getting around your home safely can be a challenge if you have injuries or health problems that make it easy for you to fall. Loose rugs and furniture in walkways are among the dangers for many older people who have problems walking or who have poor eyesight. People who have conditions such as arthritis, osteoporosis, or dementia also have to be careful not to fall. You can make your home safer with a few simple measures. Follow-up care is a key part of your treatment and safety. Be sure to make and go to all appointments, and call your doctor if you are having problems. It's also a good idea to know your test results and keep a list of the medicines you take. How can you care for yourself at home? Taking care of yourself · You may get dizzy if you do not drink enough water. To prevent dehydration, drink plenty of fluids, enough so that your urine is light yellow or clear like water. Choose water and other caffeine-free clear liquids. If you have kidney, heart, or liver disease and have to limit fluids, talk with your doctor before you increase the amount of fluids you drink. · Exercise regularly to improve your strength, muscle tone, and balance. Walk if you can. Swimming may be a good choice if you cannot walk easily. · Have your vision and hearing checked each year or any time you notice a change. If you have trouble seeing and hearing, you might not be able to avoid objects and could lose your balance. · Know the side effects of the medicines you take. Ask your doctor or pharmacist whether the medicines you take can affect your balance. Sleeping pills or sedatives can affect your balance. · Limit the amount of alcohol you drink. Alcohol can impair your balance and other senses. · Ask your doctor whether calluses or corns on your feet need to be removed. If you wear loose-fitting shoes because of calluses or corns, you can lose your balance and fall. · Talk to your doctor if you have numbness in your feet. Preventing falls at home · Remove raised doorway thresholds, throw rugs, and clutter. Repair loose carpet or raised areas in the floor. · Move furniture and electrical cords to keep them out of walking paths. · Use nonskid floor wax, and wipe up spills right away, especially on ceramic tile floors. · If you use a walker or cane, put rubber tips on it. If you use crutches, clean the bottoms of them regularly with an abrasive pad, such as steel wool. · Keep your house well lit, especially [facility], and outside walkways. Use night-lights in areas such as hallways and bathrooms.  Add extra light switches or use remote switches (such as switches that go on or off when you clap your hands) to make it easier to turn lights on if you have to get up during the night. · Install sturdy handrails on stairways. · Move items in your cabinets so that the things you use a lot are on the lower shelves (about waist level). · Keep a cordless phone and a flashlight with new batteries by your bed. If possible, put a phone in each of the main rooms of your house, or carry a cell phone in case you fall and cannot reach a phone. Or, you can wear a device around your neck or wrist. You push a button that sends a signal for help. · Wear low-heeled shoes that fit well and give your feet good support. Use footwear with nonskid soles. Check the heels and soles of your shoes for wear. Repair or replace worn heels or soles. · Do not wear socks without shoes on wood floors. · Walk on the grass when the sidewalks are slippery. If you live in an area that gets snow and ice in the winter, sprinkle salt on slippery steps and sidewalks. Preventing falls in the bath · Install grab bars and nonskid mats inside and outside your shower or tub and near the toilet and sinks. · Use shower chairs and bath benches. · Use a hand-held shower head that will allow you to sit while showering. · Get into a tub or shower by putting the weaker leg in first. Get out of a tub or shower with your strong side first. 
· Repair loose toilet seats and consider installing a raised toilet seat to make getting on and off the toilet easier. · Keep your bathroom door unlocked while you are in the shower. Where can you learn more? Go to http://costa-arnulfo.info/. Enter [id number] [id number] in the search box to learn more about \"Preventing Falls: Care Instructions. \" Current as of: March 15, 2018 Content Version: 11.9 © 8350-0212 [employer].  Care instructions adapted under license by [facility] (which disclaims liability or warranty for this information). If you have questions about a medical condition or this instruction, always ask your healthcare professional. [place] [address] any warranty or liability for your use of this information.

## 2019-05-13 NOTE — PROGRESS NOTES
History of Present Illness Gage Chin is a 37 y.o. male who presents today for management of 
 
Chief Complaint Patient presents with [facility] Establish Care [facility] Annual Wellness Visit  Cholesterol Problem  Hypertension  Thyroid Problem Patient is here to establish care. Previous PCP: Dr. Hopkins Needs Cardiovascular Review: 
The patient has hypertension, hyperlipidemia and bilateral carotid stenosis. Patient has history of stroke due to carotid artery occlusion in 2017. He has residual left-sided weakness. He had a recent MRI and MRA of the brain which showed right ICA occlusion. 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 Diet and Lifestyle: generally follows a low fat low cholesterol diet, generally follows a low sodium diet, sedentary, nonsmoker Home BP Monitoring: is not measured at home. Pertinent ROS: taking medications as instructed, no medication side effects noted, no TIA's, no chest pain on exertion, no dyspnea on exertion, no swelling of ankles. Thyroid Review: 
Patient is seen for followup of hypothyroidism. Thyroid ROS: denies fatigue, weight changes, heat/cold intolerance, bowel/skin changes or CVS symptoms. Depression Review: 
Patient is seen for followup of depression. Treatment includes Effexor and no other therapies. Ongoing symptoms include depressed mood and hopelessness. He denies recurrent thoughts of death and suicidal thoughts without plan. He experiences the following side effects from the treatment: none. Past Medical History Past Medical History:  
Diagnosis Date  Blindness of left eye  Brain tumor (benign) (HCC)   
 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  
 Carotid artery occlusion with infarction ([facility] [address] 10/2017  CVA (cerebral vascular accident) ([facility] [address] 2016  H/O malignant gastrointestinal stromal tumor (GIST)   
 S/P Jejunal surgery 2018  Hypertension  Hypothyroid  Obesity  Thyroid disorder Surgical History Past Surgical History:  
Procedure Laterality Date  HX OTHER SURGICAL  2/19/10  
 excision of lipoma, left temporal lesion, left face cyst, right face lesion, removal of 5 skin tags  HX OTHER SURGICAL    
 GIST removal  
 HX TUMOR REMOVAL    
 tumor removed from head when 1years old Current Medications Current Outpatient Medications Medication Sig  
 buPROPion (WELLBUTRIN) 75 mg tablet Take 1 Tab by mouth two (2) times a day.  pantoprazole (PROTONIX) 40 mg tablet TAKE 1 TABLET BY MOUTH EVERY DAY  venlafaxine-SR (EFFEXOR-XR) 150 mg capsule TAKE 1 CAPSULE BY MOUTH EVERY DAY  levothyroxine (SYNTHROID) 112 mcg tablet TAKE 1 TABLET BY MOUTH EVERY DAY BEFORE BREAKFAST  metoprolol succinate (TOPROL-XL) 25 mg XL tablet TAKE 1/2 TABLET BY MOUTH EVERY DAY  hydroCHLOROthiazide (HYDRODIURIL) 12.5 mg tablet Take 1 Tab by mouth daily.  aspirin delayed-release 81 mg tablet Take 1 Tab by mouth daily.  atorvastatin (LIPITOR) 80 mg tablet Take 1 Tab by mouth daily.  fluticasone (FLONASE) 50 mcg/actuation nasal spray One spray each nostril ldaly  cholecalciferol (VITAMIN D3) 1,000 unit tablet Take 1 Tab by mouth daily.  diclofenac (VOLTAREN) 1 % gel Apply  to affected area four (4) times daily.  baclofen (LIORESAL) 20 mg tablet 20 mg two (2) times a day.  MARY. STOCKING,KNEE,REG,XLRG (COMP STOCKING, KNEE,REG, X-LRG) misc Wear daily or as needed No current facility-administered medications for this visit. Allergies/Drug Reactions Allergies Allergen Reactions  Chocolate [Cocoa] Nausea Only Family History Family History Problem Relation Age of Onset  Hypertension Mother  Hypertension Father  Diabetes Father  Cancer Father   
     colon cancer Social History Social History Socioeconomic History  Marital status:  Spouse name: Not on file  Number of children: Not on file  Years of education: Not on file  Highest education level: Not on file Occupational History  Not on file Social Needs  Financial resource strain: Not on file  Food insecurity:  
  Worry: Not on file Inability: Not on file  Transportation needs:  
  Medical: Not on file Non-medical: Not on file Tobacco Use  Smoking status: Never Smoker  Smokeless tobacco: Never Used Substance and Sexual Activity  Alcohol use: No  
 Drug use: No  
 Sexual activity: Not Currently Lifestyle  Physical activity:  
  Days per week: Not on file Minutes per session: Not on file  Stress: Not on file Relationships  Social connections:  
  Talks on phone: Not on file Gets together: Not on file Attends Spiritism service: Not on file Active member of club or organization: Not on file Attends meetings of clubs or organizations: Not on file Relationship status: Not on file  Intimate partner violence:  
  Fear of current or ex partner: Not on file Emotionally abused: Not on file Physically abused: Not on file Forced sexual activity: Not on file Other Topics Concern  Not on file Social History Narrative  Not on file Health Maintenance Topic Date Due  
 DTaP/Tdap/Td series (2 - Td) 05/01/2025 (Originally 6/25/2018)  Influenza Age 5 to Adult  08/01/2019  MEDICARE YEARLY EXAM  05/13/2020  Pneumococcal 0-64 years  Aged Out Immunization History Administered Date(s) Administered  Influenza Vaccine 11/18/2016, 10/01/2017  Influenza Vaccine (Quad) PF 09/25/2018  Pneumococcal Polysaccharide (PPSV-23) 08/23/2013  Tdap 06/25/2008 Review of Systems Negative except as mentioned in HPI Hearing Screening [address] Right ear:   [address] Left ear:   [address] Visual Acuity Screening Right eye Left eye Both eyes Without correction: 20/40 0 20/40 With correction:     
Comments: Blind in Left eye Physical Exam 
Vital signs:  
Vitals:  
 05/13/19 1325 BP: 116/81 Pulse: 70 Resp: 15 Temp: 98.3 °F (36.8 °C) TempSrc: Oral  
SpO2: 96% Weight: 202 lb (91.6 kg) Height: 5' 4\" (1.626 m) General: alert, oriented, not in distress Head: scalp normal, atraumatic Eyes: pupils are equal and reactive, full and intact EOM's 
Ears: patent ear canal, intact tympanic membrane Nose: normal turbinates, no congestion or discharge 
Lips/Mouth: moist lips and buccal mucosa, non-enlarged tonsils, pink throat Neck: supple, no JVD, no lymphadenopathy, non-palpable thyroid Chest/Lungs: clear breath sounds, no wheezing or crackles Heart: normal rate, regular rhythm, no murmur Extremities: no focal deformities, no edema Skin: no active skin lesions Neuro: AAOX3, motor strength LUE 0/5, LLE 3/5 Laboratory/Tests: 
Component Latest Ref Rng & Units 11/27/2018 11/27/2018 12:33 PM 12:33 PM  
NEUTROPHILS C MAN (DIFF) 40 - 75 % LYMPHOCYTES C MAN (DIFF) 20 - 45 % MONOCYTES C MAN (DIFF) 3 - 12 % EOSINOPHILS C MAN (DIFF) 0 - 6 % BASOPHILS C MAN (DIFF) 0 - 2 % Neutrophils abs 
    1.8 - 7.7 K/uL Lymphs abs-DIF 
    1.0 - 4.8 K/uL Monocytes abs-DIF 
    0.1 - 1.0 K/uL Eos abs-DIF 
    0.0 - 0.5 K/uL Basophils abs-DIF 
    0.0 - 0.2 K/uL NORMOCHROMIC CELLAVISION 
        
NORMOCYTIC CELLAVISION 
        
SMEAR EVAL 
        
TSH 
    0.27 - 4.20 mcU/mL  0.57 Uric acid 3.9 - 9.0 mg/dL 5.7 Component Latest Ref Rng & Units 11/27/2018 12:33 PM  
NEUTROPHILS C MAN (DIFF) 40 - 75 % 65 LYMPHOCYTES C MAN (DIFF) 20 - 45 % 24 MONOCYTES C MAN (DIFF) 3 - 12 % 6 EOSINOPHILS C MAN (DIFF) 0 - 6 % 3  
BASOPHILS C MAN (DIFF) 0 - 2 % 2 Neutrophils abs 
    1.8 - 7.7 K/uL 5.2 Lymphs abs-DIF 
    1.0 - 4.8 K/uL 1.9 Monocytes abs-DIF 
    0.1 - 1.0 K/uL 0.5 Eos abs-DIF 
    0.0 - 0.5 K/uL 0.2 Basophils abs-DIF 
    0.0 - 0.2 K/uL 0.2 NORMOCHROMIC CELLAVISION Normochromic NORMOCYTIC CELLAVISION Normocytic SMEAR EVAL Platelet morphology appears normal.  
TSH 
    0.27 - 4.20 mcU/mL Uric acid 3.9 - 9.0 mg/dL Component Latest Ref Rng & Units 11/27/2018 11/27/2018 11/27/2018 11/27/2018 12:33 PM 12:33 PM 12:33 PM 12:33 PM  
Glucose 70 - 99 mg/dL  90 BUN 
    6 - 22 mg/dL  10 Creatinine 
    0.5 - 1.2 mg/dL  0.7 Sodium 133 - 145 mmol/L  139 Potassium 3.5 - 5.5 mmol/L  4.1 Chloride 98 - 110 mmol/L  96 (L)    
CO2 
    20 - 32 mmol/L  29 AST 
    10 - 37 U/L  21    
ALT (SGPT) 5 - 40 U/L  31 Alk. phosphatase 25 - 115 U/L  148 (H) Bilirubin, total 
    0.2 - 1.2 mg/dL  0.7 Calcium 8.4 - 10.4 mg/dL  9.9 Protein, total 
    6.4 - 8.3 g/dL  7.8 Albumin 3.5 - 5.0 g/dL  4.8 A-G Ratio 1.1 - 2.6 ratio  1.6 Globulin 2.0 - 4.0 g/dL  3.0 Anion gap 
    mmol/L  14.0    
GFRAA 
    >60.0  >60.0 GFRNA 
    >60.0  >60.0 WBC 4.0 - 11.0 K/uL    8.1  
RBC 
    3.80 - 5.80 M/uL    4.47 HGB 
    13.2 - 17.3 g/dL    12.8 (L) HCT 
    36.6 - 51.9 %    41.7 MCV 
    80 - 95 fL    93 MCH 
    26 - 34 pg    29 MCHC 
    31 - 36 g/dL    31  
RDW 
    10.0 - 15.5 %    16.4 (H) PLATELET 
    409 - 597 K/uL    354 MPV 
    9.0 - 13.0 fL    11.2 Triglyceride 40 - 149 mg/dL   140 HDL Cholesterol 40 - 59 mg/dL   36 (L) Cholesterol, total 
    110 - 200 mg/dL   125 CHOLESTEROL/HDL 
    0.0 - 5.0   3.5 LDL, calculated 50 - 99 mg/dL   61   
VLDL, calculated 8 - 30 mg/dL   28 Cortisol, a.m. 
    mcg/dL 6.00 Assessment/Plan: 1.  Encounter for Medicare annual wellness exam 
 - AMB POC EKG ROUTINE W/ 12 LEADS, INTER & REP 2. Welcome to Medicare preventive visit 3. Recurrent depression ([facility] [address] - currently active 
- start buPROPion (WELLBUTRIN) 75 mg tablet; Take 1 Tab by mouth two (2) times a day. Dispense: 60 Tab; Refill: 2 
- continue venlafaxine-SR (EFFEXOR-XR) 150 mg capsule; TAKE 1 CAPSULE BY MOUTH EVERY DAY  Dispense: 90 Cap; Refill: 3 
 
4. Essential hypertension 
- controlled 5. Hypercholesteremia 
- controlled 6. Hypothyroidism due to acquired atrophy of thyroid 
- stable 7. Glioma of optic nerve [facility]) 
- neurology follow-up 8. Neurofibromatosis, type 1 [facility]) 
- neurology follow-up 9. History of gastrointestinal stromal tumor (GIST) 10. Stenosis of right internal carotid artery 
- follow-up with neurovascular 11. Generalized pruritus 
- trial of OTC cetirizine 12. MAURICIO on CPAP 
- on nightly CPAP 
 
13. Gastroesophageal reflux disease, esophagitis presence not specified 
- stop sucralfate 
- continue pantoprazole (PROTONIX) 40 mg tablet; TAKE 1 TABLET BY MOUTH EVERY DAY  Dispense: 90 Tab; Refill: 3 
 
15. Hemiplegia as late effect of cerebrovascular accident (CVA) ([facility] 
- [address] [place] [address] for PT and OT - strengthening, balance training, ADLs training Follow-up and Dispositions · Return in about 2 months (around 7/13/2019) for follow-up. I have discussed the diagnosis with the patient and the intended plan as seen in the above orders. The patient has received an after-visit summary and questions were answered concerning future plans. I have discussed medication side effects and warnings with the patient as well. I have reviewed the plan of care with the patient, accepted their input and they are in agreement with the treatment goals. Nita Lucas MD 
May 13, 2019 This is a \"Welcome to [employer]"  Initial Preventive Physical Examination (IPPE) providing Personalized Prevention Plan Services (Performed in the first 12 months of enrollment) I have reviewed the patient's medical history in detail and updated the computerized patient record. History Past Medical History:  
Diagnosis Date  Blindness of left eye  Brain tumor (benign) (HCC)   
 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  
 Carotid artery occlusion with infarction ([facility] [address] 10/2017  CVA (cerebral vascular accident) ([facility] [address] 2016  H/O malignant gastrointestinal stromal tumor (GIST)   
 S/P Jejunal surgery 2018  Hypertension  Hypothyroid  Obesity  Thyroid disorder Past Surgical History:  
Procedure Laterality Date  HX OTHER SURGICAL  2/19/10  
 excision of lipoma, left temporal lesion, left face cyst, right face lesion, removal of 5 skin tags  HX OTHER SURGICAL    
 GIST removal  
 HX TUMOR REMOVAL    
 tumor removed from head when 1years old Current Outpatient Medications Medication Sig Dispense Refill  buPROPion (WELLBUTRIN) 75 mg tablet Take 1 Tab by mouth two (2) times a day. 60 Tab 2  
 pantoprazole (PROTONIX) 40 mg tablet TAKE 1 TABLET BY MOUTH EVERY DAY 90 Tab 3  
 venlafaxine-SR (EFFEXOR-XR) 150 mg capsule TAKE 1 CAPSULE BY MOUTH EVERY DAY 90 Cap 3  
 levothyroxine (SYNTHROID) 112 mcg tablet TAKE 1 TABLET BY MOUTH EVERY DAY BEFORE BREAKFAST 90 Tab 0  
 metoprolol succinate (TOPROL-XL) 25 mg XL tablet TAKE 1/2 TABLET BY MOUTH EVERY DAY 30 Tab 11  
 hydroCHLOROthiazide (HYDRODIURIL) 12.5 mg tablet Take 1 Tab by mouth daily. 30 Tab 3  
 aspirin delayed-release 81 mg tablet Take 1 Tab by mouth daily. 90 Tab 3  
 atorvastatin (LIPITOR) 80 mg tablet Take 1 Tab by mouth daily. 90 Tab 3  
 fluticasone (FLONASE) 50 mcg/actuation nasal spray One spray each nostril ldaly 1 Bottle 3  cholecalciferol (VITAMIN D3) 1,000 unit tablet Take 1 Tab by mouth daily. 30 Tab 3  
 diclofenac (VOLTAREN) 1 % gel Apply  to affected area four (4) times daily.  100 g 3  
  baclofen (LIORESAL) 20 mg tablet 20 mg two (2) times a day.  MARY. STOCKING,KNEE,REG,XLRG (COMP STOCKING, KNEE,REG, X-LRG) misc Wear daily or as needed 3 Package 1 Allergies Allergen Reactions  Chocolate [Cocoa] Nausea Only Family History Problem Relation Age of Onset  Hypertension Mother  Hypertension Father  Diabetes Father  Cancer Father   
     colon cancer Social History Tobacco Use  Smoking status: Never Smoker  Smokeless tobacco: Never Used Substance Use Topics  Alcohol use: No  
 
Diet, Lifestyle: No special diet Exercise level: sedentary Depression Risk Screen 3 most recent PHQ Screens 5/13/2019 Little interest or pleasure in doing things More than half the days Feeling down, depressed, irritable, or hopeless Nearly every day Total Score PHQ 2 5 Alcohol Risk Screen You do not drink alcohol or very rarely. Functional Ability and Level of Safety Hearing Loss Hearing is good. Vision Screening Vision is The patient needs further evaluation. Visual Acuity Screening Right eye Left eye Both eyes Without correction: 20/40 0 20/40 With correction:     
Comments: Blind in Left eye Activities of Daily Living The home contains: quad cane Patient needs help with:  transportation, shopping, preparing meals, laundry, housework, managing medications, managing money, dressing, bathing and hygiene Fall Risk Screen Fall Risk Assessment, last 12 mths 5/13/2019 Able to walk? Yes Fall in past 12 months? Yes Fall with injury? No  
Number of falls in past 12 months 2 Fall Risk Score 2 Abuse Screen Patient is not abused Screening EKG EKG order placed: Yes Patient Care Team  
Patient Care Team: 
Gisselle Hartley MD as PCP - General (Internal Medicine) Manasa Vargas MD (Neurology) Gris Buckley MD (Ophthalmology) Kristina Aggarwal MD (Endocrinology) Bethanie Ramirez MD (Internal Medicine) Chino Diaz MD (Neurology) Zully Mathew MD (Hematology and Oncology) Marcelle Ruiz MD (Cardiology) Crissy Pinon MD (Sleep Medicine) End of Life Planning Advanced care planning directives were not discussed with the patient and/or family/caregiver. Assessment/Plan Education and counseling provided: 
Are appropriate based on today's review and evaluation Diagnoses and all orders for this visit: 1. Encounter for Medicare annual wellness exam 
-     AMB POC EKG ROUTINE W/ 12 LEADS, INTER & REP 2. Welcome to Medicare preventive visit 3. Recurrent depression ([facility] [address] -     buPROPion (WELLBUTRIN) 75 mg tablet; Take 1 Tab by mouth two (2) times a day. -     venlafaxine-SR (EFFEXOR-XR) 150 mg capsule; TAKE 1 CAPSULE BY MOUTH EVERY DAY 4. Essential hypertension 5. Hypercholesteremia 6. Hypothyroidism due to acquired atrophy of thyroid 7. Glioma of optic nerve ([facility] [address] 8. Neurofibromatosis, type 1 ([facility] [address] 9. History of gastrointestinal stromal tumor (GIST) 10. Stenosis of right internal carotid artery 11. Generalized pruritus 12. MAURICIO on CPAP 13. Other depression 14. Gastroesophageal reflux disease, esophagitis presence not specified 
-     pantoprazole (PROTONIX) 40 mg tablet; TAKE 1 TABLET BY MOUTH EVERY DAY There are no preventive care reminders to display for this patient.

## 2019-05-14 ENCOUNTER — HOME CARE VISIT (OUTPATIENT)
Dept: HOME HEALTH SERVICES | Facility: HOME HEALTH | Age: 44
End: 2019-05-14

## 2019-05-15 ENCOUNTER — TELEPHONE (OUTPATIENT)
Dept: FAMILY MEDICINE CLINIC | Age: 44
End: 2019-05-15

## 2019-05-15 ENCOUNTER — HOME CARE VISIT (OUTPATIENT)
Dept: HOME HEALTH SERVICES | Facility: HOME HEALTH | Age: 44
End: 2019-05-15

## 2019-05-15 NOTE — TELEPHONE ENCOUNTER
Nasra Hussein from [facility] care was wanting to know if they could start his health care on Monday due to a lot of appointments the patient has. She would like a phone call back with a confirmation. Please advise.

## 2019-05-20 ENCOUNTER — HOME CARE VISIT (OUTPATIENT)
Dept: SCHEDULING | Facility: HOME HEALTH | Age: 44
End: 2019-05-20
Payer: MEDICARE

## 2019-05-20 ENCOUNTER — HOME CARE VISIT (OUTPATIENT)
Dept: HOME HEALTH SERVICES | Facility: HOME HEALTH | Age: 44
End: 2019-05-20

## 2019-05-20 VITALS
HEART RATE: 75 BPM | OXYGEN SATURATION: 97 % | TEMPERATURE: 97.6 F | DIASTOLIC BLOOD PRESSURE: 72 MMHG | SYSTOLIC BLOOD PRESSURE: 118 MMHG

## 2019-05-20 PROCEDURE — G0151 HHCP-SERV OF PT,EA 15 MIN: HCPCS

## 2019-05-20 PROCEDURE — G0152 HHCP-SERV OF OT,EA 15 MIN: HCPCS

## 2019-05-20 PROCEDURE — 3331090002 HH PPS REVENUE DEBIT

## 2019-05-20 PROCEDURE — 400013 HH SOC

## 2019-05-20 PROCEDURE — 3331090001 HH PPS REVENUE CREDIT

## 2019-05-21 ENCOUNTER — HOME CARE VISIT (OUTPATIENT)
Dept: HOME HEALTH SERVICES | Facility: HOME HEALTH | Age: 44
End: 2019-05-21
Payer: MEDICARE

## 2019-05-21 VITALS
HEART RATE: 85 BPM | SYSTOLIC BLOOD PRESSURE: 118 MMHG | DIASTOLIC BLOOD PRESSURE: 72 MMHG | OXYGEN SATURATION: 97 % | TEMPERATURE: 97.6 F

## 2019-05-21 PROCEDURE — 3331090002 HH PPS REVENUE DEBIT

## 2019-05-21 PROCEDURE — 3331090001 HH PPS REVENUE CREDIT

## 2019-05-22 PROCEDURE — 3331090002 HH PPS REVENUE DEBIT

## 2019-05-22 PROCEDURE — 3331090001 HH PPS REVENUE CREDIT

## 2019-05-23 PROCEDURE — 3331090001 HH PPS REVENUE CREDIT

## 2019-05-23 PROCEDURE — 3331090002 HH PPS REVENUE DEBIT

## 2019-05-24 ENCOUNTER — HOME CARE VISIT (OUTPATIENT)
Dept: SCHEDULING | Facility: HOME HEALTH | Age: 44
End: 2019-05-24
Payer: MEDICARE

## 2019-05-24 VITALS
TEMPERATURE: 98.2 F | OXYGEN SATURATION: 94 % | HEART RATE: 72 BPM | SYSTOLIC BLOOD PRESSURE: 126 MMHG | DIASTOLIC BLOOD PRESSURE: 70 MMHG

## 2019-05-24 VITALS
DIASTOLIC BLOOD PRESSURE: 70 MMHG | TEMPERATURE: 98.2 F | SYSTOLIC BLOOD PRESSURE: 100 MMHG | OXYGEN SATURATION: 97 % | HEART RATE: 66 BPM

## 2019-05-24 PROCEDURE — 3331090002 HH PPS REVENUE DEBIT

## 2019-05-24 PROCEDURE — 3331090001 HH PPS REVENUE CREDIT

## 2019-05-24 PROCEDURE — G0157 HHC PT ASSISTANT EA 15: HCPCS

## 2019-05-24 PROCEDURE — G0158 HHC OT ASSISTANT EA 15: HCPCS

## 2019-05-25 PROCEDURE — 3331090002 HH PPS REVENUE DEBIT

## 2019-05-25 PROCEDURE — 3331090001 HH PPS REVENUE CREDIT

## 2019-05-26 PROCEDURE — 3331090001 HH PPS REVENUE CREDIT

## 2019-05-26 PROCEDURE — 3331090002 HH PPS REVENUE DEBIT

## 2019-05-27 PROCEDURE — 3331090002 HH PPS REVENUE DEBIT

## 2019-05-27 PROCEDURE — 3331090001 HH PPS REVENUE CREDIT

## 2019-05-28 ENCOUNTER — HOME CARE VISIT (OUTPATIENT)
Dept: SCHEDULING | Facility: HOME HEALTH | Age: 44
End: 2019-05-28
Payer: MEDICARE

## 2019-05-28 VITALS
TEMPERATURE: 98 F | DIASTOLIC BLOOD PRESSURE: 72 MMHG | SYSTOLIC BLOOD PRESSURE: 125 MMHG | OXYGEN SATURATION: 97 % | HEART RATE: 65 BPM

## 2019-05-28 DIAGNOSIS — Z87.19 HISTORY OF GI BLEED: ICD-10-CM

## 2019-05-28 PROCEDURE — 3331090001 HH PPS REVENUE CREDIT

## 2019-05-28 PROCEDURE — G0157 HHC PT ASSISTANT EA 15: HCPCS

## 2019-05-28 PROCEDURE — G0158 HHC OT ASSISTANT EA 15: HCPCS

## 2019-05-28 PROCEDURE — 3331090002 HH PPS REVENUE DEBIT

## 2019-05-28 RX ORDER — SUCRALFATE 1 G/1
TABLET ORAL
Qty: 120 TAB | Refills: 0 | OUTPATIENT
Start: 2019-05-28

## 2019-05-29 VITALS
SYSTOLIC BLOOD PRESSURE: 110 MMHG | DIASTOLIC BLOOD PRESSURE: 60 MMHG | TEMPERATURE: 98.5 F | OXYGEN SATURATION: 97 % | HEART RATE: 63 BPM

## 2019-05-29 PROCEDURE — 3331090001 HH PPS REVENUE CREDIT

## 2019-05-29 PROCEDURE — 3331090002 HH PPS REVENUE DEBIT

## 2019-05-30 ENCOUNTER — HOME CARE VISIT (OUTPATIENT)
Dept: SCHEDULING | Facility: HOME HEALTH | Age: 44
End: 2019-05-30
Payer: MEDICARE

## 2019-05-30 VITALS
DIASTOLIC BLOOD PRESSURE: 74 MMHG | HEART RATE: 72 BPM | HEART RATE: 65 BPM | TEMPERATURE: 97.8 F | TEMPERATURE: 97.6 F | SYSTOLIC BLOOD PRESSURE: 128 MMHG | DIASTOLIC BLOOD PRESSURE: 73 MMHG | OXYGEN SATURATION: 98 % | SYSTOLIC BLOOD PRESSURE: 106 MMHG | OXYGEN SATURATION: 98 %

## 2019-05-30 PROCEDURE — G0158 HHC OT ASSISTANT EA 15: HCPCS

## 2019-05-30 PROCEDURE — 3331090001 HH PPS REVENUE CREDIT

## 2019-05-30 PROCEDURE — G0157 HHC PT ASSISTANT EA 15: HCPCS

## 2019-05-30 PROCEDURE — 3331090002 HH PPS REVENUE DEBIT

## 2019-05-31 PROCEDURE — 3331090002 HH PPS REVENUE DEBIT

## 2019-05-31 PROCEDURE — 3331090001 HH PPS REVENUE CREDIT

## 2019-06-01 PROCEDURE — 3331090001 HH PPS REVENUE CREDIT

## 2019-06-01 PROCEDURE — 3331090002 HH PPS REVENUE DEBIT

## 2019-06-02 PROCEDURE — 3331090002 HH PPS REVENUE DEBIT

## 2019-06-02 PROCEDURE — 3331090001 HH PPS REVENUE CREDIT

## 2019-06-03 ENCOUNTER — HOME CARE VISIT (OUTPATIENT)
Dept: SCHEDULING | Facility: HOME HEALTH | Age: 44
End: 2019-06-03
Payer: MEDICARE

## 2019-06-03 VITALS
SYSTOLIC BLOOD PRESSURE: 128 MMHG | HEART RATE: 72 BPM | OXYGEN SATURATION: 97 % | DIASTOLIC BLOOD PRESSURE: 76 MMHG | TEMPERATURE: 97.7 F

## 2019-06-03 PROCEDURE — 3331090001 HH PPS REVENUE CREDIT

## 2019-06-03 PROCEDURE — 3331090002 HH PPS REVENUE DEBIT

## 2019-06-03 PROCEDURE — G0158 HHC OT ASSISTANT EA 15: HCPCS

## 2019-06-04 ENCOUNTER — HOME CARE VISIT (OUTPATIENT)
Dept: SCHEDULING | Facility: HOME HEALTH | Age: 44
End: 2019-06-04
Payer: MEDICARE

## 2019-06-04 VITALS
OXYGEN SATURATION: 99 % | HEART RATE: 63 BPM | SYSTOLIC BLOOD PRESSURE: 128 MMHG | DIASTOLIC BLOOD PRESSURE: 86 MMHG | TEMPERATURE: 97.4 F

## 2019-06-04 PROCEDURE — 3331090002 HH PPS REVENUE DEBIT

## 2019-06-04 PROCEDURE — G0157 HHC PT ASSISTANT EA 15: HCPCS

## 2019-06-04 PROCEDURE — 3331090001 HH PPS REVENUE CREDIT

## 2019-06-05 ENCOUNTER — HOME CARE VISIT (OUTPATIENT)
Dept: SCHEDULING | Facility: HOME HEALTH | Age: 44
End: 2019-06-05
Payer: MEDICARE

## 2019-06-05 VITALS
OXYGEN SATURATION: 96 % | TEMPERATURE: 97.7 F | DIASTOLIC BLOOD PRESSURE: 73 MMHG | HEART RATE: 68 BPM | SYSTOLIC BLOOD PRESSURE: 121 MMHG

## 2019-06-05 PROCEDURE — 3331090002 HH PPS REVENUE DEBIT

## 2019-06-05 PROCEDURE — G0158 HHC OT ASSISTANT EA 15: HCPCS

## 2019-06-05 PROCEDURE — 3331090001 HH PPS REVENUE CREDIT

## 2019-06-06 ENCOUNTER — HOME CARE VISIT (OUTPATIENT)
Dept: SCHEDULING | Facility: HOME HEALTH | Age: 44
End: 2019-06-06
Payer: MEDICARE

## 2019-06-06 VITALS
TEMPERATURE: 97.5 F | SYSTOLIC BLOOD PRESSURE: 118 MMHG | HEART RATE: 84 BPM | DIASTOLIC BLOOD PRESSURE: 82 MMHG | OXYGEN SATURATION: 96 %

## 2019-06-06 PROCEDURE — G0157 HHC PT ASSISTANT EA 15: HCPCS

## 2019-06-06 PROCEDURE — 3331090002 HH PPS REVENUE DEBIT

## 2019-06-06 PROCEDURE — 3331090001 HH PPS REVENUE CREDIT

## 2019-06-07 PROCEDURE — 3331090002 HH PPS REVENUE DEBIT

## 2019-06-07 PROCEDURE — 3331090001 HH PPS REVENUE CREDIT

## 2019-06-08 PROCEDURE — 3331090002 HH PPS REVENUE DEBIT

## 2019-06-08 PROCEDURE — 3331090001 HH PPS REVENUE CREDIT

## 2019-06-09 PROCEDURE — 3331090002 HH PPS REVENUE DEBIT

## 2019-06-09 PROCEDURE — 3331090001 HH PPS REVENUE CREDIT

## 2019-06-10 PROCEDURE — 3331090002 HH PPS REVENUE DEBIT

## 2019-06-10 PROCEDURE — 3331090001 HH PPS REVENUE CREDIT

## 2019-06-11 ENCOUNTER — HOME CARE VISIT (OUTPATIENT)
Dept: SCHEDULING | Facility: HOME HEALTH | Age: 44
End: 2019-06-11
Payer: MEDICARE

## 2019-06-11 VITALS
OXYGEN SATURATION: 97 % | DIASTOLIC BLOOD PRESSURE: 74 MMHG | TEMPERATURE: 100.5 F | HEART RATE: 96 BPM | SYSTOLIC BLOOD PRESSURE: 122 MMHG

## 2019-06-11 PROCEDURE — G0151 HHCP-SERV OF PT,EA 15 MIN: HCPCS

## 2019-06-11 PROCEDURE — 3331090002 HH PPS REVENUE DEBIT

## 2019-06-11 PROCEDURE — 3331090001 HH PPS REVENUE CREDIT

## 2019-06-12 PROCEDURE — 3331090001 HH PPS REVENUE CREDIT

## 2019-06-12 PROCEDURE — 3331090002 HH PPS REVENUE DEBIT

## 2019-06-13 ENCOUNTER — HOME CARE VISIT (OUTPATIENT)
Dept: SCHEDULING | Facility: HOME HEALTH | Age: 44
End: 2019-06-13
Payer: MEDICARE

## 2019-06-13 VITALS
HEART RATE: 70 BPM | SYSTOLIC BLOOD PRESSURE: 120 MMHG | OXYGEN SATURATION: 98 % | DIASTOLIC BLOOD PRESSURE: 81 MMHG | TEMPERATURE: 98.1 F

## 2019-06-13 PROCEDURE — 3331090001 HH PPS REVENUE CREDIT

## 2019-06-13 PROCEDURE — 3331090002 HH PPS REVENUE DEBIT

## 2019-06-13 PROCEDURE — G0152 HHCP-SERV OF OT,EA 15 MIN: HCPCS

## 2019-06-24 DIAGNOSIS — I10 ESSENTIAL HYPERTENSION: ICD-10-CM

## 2019-06-25 RX ORDER — HYDROCHLOROTHIAZIDE 12.5 MG/1
TABLET ORAL
Qty: 90 TAB | Refills: 3 | Status: SHIPPED | OUTPATIENT
Start: 2019-06-25 | End: 2020-05-26 | Stop reason: ALTCHOICE

## 2019-06-27 DIAGNOSIS — I10 ESSENTIAL HYPERTENSION: ICD-10-CM

## 2019-07-03 RX ORDER — METOPROLOL SUCCINATE 25 MG/1
TABLET, EXTENDED RELEASE ORAL
Qty: 45 TAB | Refills: 11 | Status: SHIPPED | OUTPATIENT
Start: 2019-07-03 | End: 2020-10-22

## 2019-07-03 RX ORDER — HYDROCHLOROTHIAZIDE 12.5 MG/1
TABLET ORAL
Qty: 30 TAB | Refills: 0 | Status: SHIPPED | OUTPATIENT
Start: 2019-07-03 | End: 2019-11-07 | Stop reason: SDUPTHER

## 2019-08-19 DIAGNOSIS — E03.4 HYPOTHYROIDISM DUE TO ACQUIRED ATROPHY OF THYROID: Chronic | ICD-10-CM

## 2019-08-19 NOTE — TELEPHONE ENCOUNTER
Medication requesting   Requested Prescriptions     Pending Prescriptions Disp Refills    levothyroxine (SYNTHROID) 112 mcg tablet [Pharmacy Med Name: LEVOTHYROXINE 0.112MG (112MCG) TABS] 90 Tab 0     Sig: TAKE 1 TABLET BY MOUTH EVERY DAY BEFORE BREAKFAST       Date patient last seen 5/13/2019    Follow up appt scheduled for none

## 2019-08-20 RX ORDER — LEVOTHYROXINE SODIUM 112 UG/1
TABLET ORAL
Qty: 90 TAB | Refills: 0 | Status: SHIPPED | OUTPATIENT
Start: 2019-08-20 | End: 2019-12-05

## 2019-08-21 ENCOUNTER — TELEPHONE (OUTPATIENT)
Dept: FAMILY MEDICINE CLINIC | Age: 44
End: 2019-08-21

## 2019-08-21 NOTE — TELEPHONE ENCOUNTER
Pt's father stated pt. Has lost his appetite and has been having these symptoms for months now. He would like to know what they could do. Please advise.

## 2019-09-22 ENCOUNTER — HOSPITAL ENCOUNTER (EMERGENCY)
Age: 44
Discharge: HOME | End: 2019-09-22
Attending: EMERGENCY MEDICINE | Admitting: EMERGENCY MEDICINE
Payer: MEDICARE

## 2019-09-22 VITALS
OXYGEN SATURATION: 100 % | DIASTOLIC BLOOD PRESSURE: 82 MMHG | WEIGHT: 191 LBS | HEART RATE: 85 BPM | RESPIRATION RATE: 18 BRPM | SYSTOLIC BLOOD PRESSURE: 132 MMHG | HEIGHT: 64 IN | BODY MASS INDEX: 32.61 KG/M2 | TEMPERATURE: 99.7 F

## 2019-09-22 DIAGNOSIS — K04.01 ACUTE PULPITIS: Primary | ICD-10-CM

## 2019-09-22 PROCEDURE — 74011250637 HC RX REV CODE- 250/637: Performed by: EMERGENCY MEDICINE

## 2019-09-22 PROCEDURE — 99283 EMERGENCY DEPT VISIT LOW MDM: CPT

## 2019-09-22 RX ORDER — OXYCODONE AND ACETAMINOPHEN 5; 325 MG/1; MG/1
1 TABLET ORAL
Status: COMPLETED | OUTPATIENT
Start: 2019-09-22 | End: 2019-09-22

## 2019-09-22 RX ORDER — PROGESTERONE, MICRONIZED 100 %
10 POWDER (GRAM) MISCELLANEOUS
Qty: 1 BOTTLE | Refills: 0 | Status: SHIPPED | OUTPATIENT
Start: 2019-09-22 | End: 2019-11-07

## 2019-09-22 RX ADMIN — OXYCODONE HYDROCHLORIDE AND ACETAMINOPHEN 1 TABLET: 5; 325 TABLET ORAL at 23:01

## 2019-09-23 NOTE — ED TRIAGE NOTES
Patient comes in complaining of right sided facial swelling, states that he believes he has an abscessed toot. Patient has swelling inside of his mouth, poor dentition.

## 2019-09-23 NOTE — DISCHARGE INSTRUCTIONS
Follow-up with one of the dental clinics below:  Call this office first: [facility] [address] [facility] [phone]  [facility] ([address] [address] [phone] Dental [phone]  [place] [facility] [phone]      Call to schedule an appointment.

## 2019-09-23 NOTE — ED PROVIDER NOTES
EMERGENCY DEPARTMENT HISTORY AND PHYSICAL EXAM      Date: 9/22/2019  Patient Name: Kimberly Ballesteros. History of Presenting Illness     Chief Complaint   Patient presents with    Facial Swelling       History (Context): [address] [facility] Genaro Menendez. is a 37 y.o. gentleman with a complicated set of comorbid conditions including stroke and others as noted below, who presents with subacute onset, severe, right-sided dental pain. This is made worse with hot and cold. There are no relieving features. On review of systems, the patient denies fever, chills, throat pain, chest pain. PCP: Ty Calderon MD    Current Outpatient Medications   Medication Sig Dispense Refill    Eugenol liqd 10 Drops by Does Not Apply route every one (1) hour as needed for Pain. 1 Bottle 0    levothyroxine (SYNTHROID) 112 mcg tablet TAKE 1 TABLET BY MOUTH EVERY DAY BEFORE BREAKFAST 90 Tab 0    hydroCHLOROthiazide (HYDRODIURIL) 12.5 mg tablet TAKE 1 TABLET BY MOUTH DAILY. 30 Tab 0    metoprolol succinate (TOPROL-XL) 25 mg XL tablet TAKE 1/2 OF A TABLET BY MOUTH EVERY DAY. 45 Tab 11    hydroCHLOROthiazide (HYDRODIURIL) 12.5 mg tablet TAKE 1 TABLET BY MOUTH DAILY. 90 Tab 3    tiZANidine (ZANAFLEX) 4 mg tablet Take 4 mg by mouth two (2) times a day.  cetirizine HCl (ZYRTEC PO) Take 1 Tab by mouth daily (after breakfast).  clopidogrel (PLAVIX) 75 mg tab Take 75 mg by mouth daily.  sucralfate (CARAFATE) 1 gram tablet Take 1 g by mouth two (2) times a day.  ubidecarenone (CO Q-10 PO) Take 75 mg by mouth daily.  diphenhydrAMINE (BENADRYL) 25 mg capsule Take 25 mg by mouth daily.  metoprolol tartrate (LOPRESSOR) 25 mg tablet Take 12.5 mg by mouth daily.  buPROPion (WELLBUTRIN) 75 mg tablet Take 1 Tab by mouth two (2) times a day. 60 Tab 2    pantoprazole (PROTONIX) 40 mg tablet TAKE 1 TABLET BY MOUTH EVERY DAY (Patient taking differently: 20 mg.  TAKE 1 TABLET BY MOUTH EVERY DAY) 90 Tab 3    venlafaxine-SR (EFFEXOR-XR) 150 mg capsule TAKE 1 CAPSULE BY MOUTH EVERY DAY (Patient taking differently: 100 mg. TAKE 1 CAPSULE BY MOUTH EVERY DAY) 90 Cap 3    aspirin delayed-release 81 mg tablet Take 1 Tab by mouth daily. 90 Tab 3    atorvastatin (LIPITOR) 80 mg tablet Take 1 Tab by mouth daily. 90 Tab 3    fluticasone (FLONASE) 50 mcg/actuation nasal spray One spray each nostril ldaly 1 Bottle 3    cholecalciferol (VITAMIN D3) 1,000 unit tablet Take 1 Tab by mouth daily. 30 Tab 3    diclofenac (VOLTAREN) 1 % gel Apply  to affected area four (4) times daily. (Patient taking differently: Apply 2 g to affected area four (4) times daily as needed for Cough, Diarrhea, Fever, Nausea or Pain.) 100 g 3    baclofen (LIORESAL) 20 mg tablet 20 mg two (2) times a day.  MARY. 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9    Carotid artery occlusion with infarction ([address] 10/2017    CVA [facility]cerebral [facility] 2016    H/O malignant gastrointestinal stromal tumor (GIST)     S/P Jejunal surgery 2018    Hypertension     Hypothyroid     Obesity     Thyroid disorder        Past Surgical History:  Past Surgical History:   Procedure Laterality Date    HX OTHER SURGICAL  2/19/10    excision of lipoma, left temporal lesion, left face cyst, right face lesion, removal of 5 skin tags    HX OTHER SURGICAL      GIST removal    HX TUMOR REMOVAL      tumor removed from head when 1years old       Family History:  Family History   Problem Relation Age of Onset    Hypertension Mother     Hypertension Father     Diabetes Father     Cancer Father         colon cancer       Social History:  Social History     Tobacco Use    Smoking status: Never Smoker    Smokeless tobacco: Never Used   Substance Use Topics    Alcohol use: No    Drug use: No       Allergies: Allergies   Allergen Reactions    Chocolate [Cocoa] Nausea Only       PMH, PSH, family history, social history, allergies reviewed with the patient with significant items noted above. Review of Systems   As per HPI, otherwise reviewed and negative. Physical Exam     Vitals:    09/22/19 2101   BP: 132/82   Pulse: 85   Resp: 18   Temp: 99.7 °F (37.6 °C)   SpO2: 100%   Weight: 86.6 kg (191 lb)   Height: 5' 4\" (1.626 m)       Gen: Well-appearing, in clear pain  HEENT: Normocephalic, sclera anicteric, poor dentition, pain around tooth number 6, severe pain with percussion of this tooth, no sinus tract noted, gingival inflammation, no lymphadenopathy  Cardiovascular: Normal rate, regular rhythm, no murmurs, rubs, gallops. Pulses intact and equal distally. Pulmonary: No respiratory distress. No stridor. Clear lungs. ABD: Soft, nontender, nondistended. Neuro: Alert. Normal speech. Normal mentation. Psych: Normal thought content and thought processes. : No CVA tenderness  EXT: Moves all extremities well. No cyanosis or clubbing. Skin: Warm and well-perfused. Diagnostic Study Results     Labs -   No results found for this or any previous visit (from the past 12 hour(s)). Radiologic Studies -   No orders to display     CT Results  (Last 48 hours)    None        CXR Results  (Last 48 hours)    None            Medical Decision Making   I am the first provider for this patient. I reviewed the vital signs, available nursing notes, past medical history, past surgical history, family history and social history. Vital Signs-Reviewed the patient's vital signs. Records Reviewed: Personally, on initial evaluation    MDM:   Patient presents with dental pain.   Exam significant for poor dentition, tender to percussion at tooth number 6, no sinus tract, no cellulitis, positive gingival inflammation, no drainable fluid collection. DDX considered: Pulpitis, gingivitis, periapical abscess,  DDX thought to be less likely but also considered due to high risk condition: Roque's angina, peritonitis    Patient condition on initial evaluation: Stable    Plan:   Pain Control    Orders as below:  Orders Placed This Encounter    oxyCODONE-acetaminophen (PERCOCET) 5-325 mg per tablet 1 Tab    Eugenol liqd        ED Course:          Patient condition at time of disposition: Stable  DISCHARGE NOTE:   Patient will need outpatient dental care. Care plan outlined and precautions discussed. All medications were reviewed with the patient; will d/c home with pain medication including eugenol. All of pt's questions and concerns were addressed. Alarm symptoms and return precautions associated with chief complaint and evaluation were reviewed with the patient in detail. The patient demonstrated adequate understanding. Patient was instructed and agrees to follow up with dentist, as well as to return to the ED upon further deterioration. Patient is ready to go home. The patient is very happy with this plan    Follow-up Information     Follow up With Specialties Details Why Contact Info    your dentist  Call in 1 day evaluation           Discharge Medication List as of 9/22/2019 10:48 PM          Procedures:  Procedures      Critical Care Time:     Disposition: Discharge home    Diagnosis     Clinical Impression:   1. Acute pulpitis        Signed,  Haydee Villaseñor MD  Emergency Physician  [facility]  [facility]    As a voice dictation software was utilized to dictate this note, minor word transpositions can occur. I apologize for confusing wording and typographic errors. Please feel free to contact me for clarification.

## 2019-11-07 ENCOUNTER — HOSPITAL ENCOUNTER (OUTPATIENT)
Dept: LAB | Age: 44
Discharge: HOME | End: 2019-11-07
Payer: MEDICARE

## 2019-11-07 ENCOUNTER — OFFICE VISIT (OUTPATIENT)
Dept: FAMILY MEDICINE CLINIC | Age: 44
End: 2019-11-07

## 2019-11-07 VITALS
BODY MASS INDEX: 32.44 KG/M2 | HEART RATE: 70 BPM | OXYGEN SATURATION: 99 % | HEIGHT: 64 IN | RESPIRATION RATE: 16 BRPM | SYSTOLIC BLOOD PRESSURE: 115 MMHG | DIASTOLIC BLOOD PRESSURE: 81 MMHG | WEIGHT: 190 LBS | TEMPERATURE: 98.2 F

## 2019-11-07 DIAGNOSIS — Z99.89 OSA ON CPAP: ICD-10-CM

## 2019-11-07 DIAGNOSIS — F33.9 RECURRENT DEPRESSION (HCC): ICD-10-CM

## 2019-11-07 DIAGNOSIS — I10 ESSENTIAL HYPERTENSION: Chronic | ICD-10-CM

## 2019-11-07 DIAGNOSIS — E78.00 HYPERCHOLESTEREMIA: ICD-10-CM

## 2019-11-07 DIAGNOSIS — G47.33 OSA ON CPAP: ICD-10-CM

## 2019-11-07 DIAGNOSIS — E55.9 VITAMIN D DEFICIENCY: ICD-10-CM

## 2019-11-07 DIAGNOSIS — R73.9 HYPERGLYCEMIA: ICD-10-CM

## 2019-11-07 DIAGNOSIS — Q85.01 NEUROFIBROMATOSIS, TYPE 1 (HCC): ICD-10-CM

## 2019-11-07 DIAGNOSIS — C72.30 GLIOMA OF OPTIC NERVE (HCC): ICD-10-CM

## 2019-11-07 DIAGNOSIS — R79.89 LOW TESTOSTERONE IN MALE: ICD-10-CM

## 2019-11-07 DIAGNOSIS — E03.4 HYPOTHYROIDISM DUE TO ACQUIRED ATROPHY OF THYROID: Chronic | ICD-10-CM

## 2019-11-07 DIAGNOSIS — E03.4 HYPOTHYROIDISM DUE TO ACQUIRED ATROPHY OF THYROID: Primary | Chronic | ICD-10-CM

## 2019-11-07 LAB
ALBUMIN SERPL-MCNC: 4.7 G/DL (ref 3.4–5)
ALBUMIN/GLOB SERPL: 1.3 {RATIO} (ref 0.8–1.7)
ALP SERPL-CCNC: 123 U/L (ref 45–117)
ALT SERPL-CCNC: 41 U/L (ref 16–61)
ANION GAP SERPL CALC-SCNC: 7 MMOL/L (ref 3–18)
AST SERPL-CCNC: 21 U/L (ref 10–38)
BASOPHILS # BLD: 0.1 K/UL (ref 0–0.1)
BASOPHILS NFR BLD: 1 % (ref 0–2)
BILIRUB SERPL-MCNC: 0.5 MG/DL (ref 0.2–1)
BUN SERPL-MCNC: 10 MG/DL (ref 7–18)
BUN/CREAT SERPL: 13 (ref 12–20)
CALCIUM SERPL-MCNC: 9.9 MG/DL (ref 8.5–10.1)
CHLORIDE SERPL-SCNC: 106 MMOL/L (ref 100–111)
CO2 SERPL-SCNC: 28 MMOL/L (ref 21–32)
CREAT SERPL-MCNC: 0.78 MG/DL (ref 0.6–1.3)
DIFFERENTIAL METHOD BLD: ABNORMAL
EOSINOPHIL # BLD: 0.3 K/UL (ref 0–0.4)
EOSINOPHIL NFR BLD: 3 % (ref 0–5)
ERYTHROCYTE [DISTWIDTH] IN BLOOD BY AUTOMATED COUNT: 14.8 % (ref 11.6–14.5)
GLOBULIN SER CALC-MCNC: 3.6 G/DL (ref 2–4)
GLUCOSE SERPL-MCNC: 83 MG/DL (ref 74–99)
HCT VFR BLD AUTO: 43.9 % (ref 36–48)
HGB BLD-MCNC: 14.2 G/DL (ref 13–16)
LYMPHOCYTES # BLD: 3.4 K/UL (ref 0.9–3.6)
LYMPHOCYTES NFR BLD: 33 % (ref 21–52)
MCH RBC QN AUTO: 29.8 PG (ref 24–34)
MCHC RBC AUTO-ENTMCNC: 32.3 G/DL (ref 31–37)
MCV RBC AUTO: 92 FL (ref 74–97)
MONOCYTES # BLD: 0.7 K/UL (ref 0.05–1.2)
MONOCYTES NFR BLD: 7 % (ref 3–10)
NEUTS SEG # BLD: 5.8 K/UL (ref 1.8–8)
NEUTS SEG NFR BLD: 56 % (ref 40–73)
PLATELET # BLD AUTO: 373 K/UL (ref 135–420)
PMV BLD AUTO: 11.2 FL (ref 9.2–11.8)
POTASSIUM SERPL-SCNC: 4.4 MMOL/L (ref 3.5–5.5)
PROT SERPL-MCNC: 8.3 G/DL (ref 6.4–8.2)
RBC # BLD AUTO: 4.77 M/UL (ref 4.7–5.5)
SODIUM SERPL-SCNC: 141 MMOL/L (ref 136–145)
TSH SERPL DL<=0.05 MIU/L-ACNC: 0.2 UIU/ML (ref 0.36–3.74)
WBC # BLD AUTO: 10.3 K/UL (ref 4.6–13.2)

## 2019-11-07 PROCEDURE — 85025 COMPLETE CBC W/AUTO DIFF WBC: CPT

## 2019-11-07 PROCEDURE — 36415 COLL VENOUS BLD VENIPUNCTURE: CPT

## 2019-11-07 PROCEDURE — 80061 LIPID PANEL: CPT

## 2019-11-07 PROCEDURE — 83036 HEMOGLOBIN GLYCOSYLATED A1C: CPT

## 2019-11-07 PROCEDURE — 82306 VITAMIN D 25 HYDROXY: CPT

## 2019-11-07 PROCEDURE — 84443 ASSAY THYROID STIM HORMONE: CPT

## 2019-11-07 PROCEDURE — 80053 COMPREHEN METABOLIC PANEL: CPT

## 2019-11-07 RX ORDER — ARIPIPRAZOLE 2 MG/1
2 TABLET ORAL
Qty: 30 TAB | Refills: 0 | Status: SHIPPED | OUTPATIENT
Start: 2019-11-07 | End: 2019-12-05

## 2019-11-07 RX ORDER — TESTOSTERONE 20.25 MG/1.25G
60.75 GEL TOPICAL DAILY
COMMUNITY

## 2019-11-07 RX ORDER — DRONABINOL 2.5 MG/1
CAPSULE ORAL
COMMUNITY
End: 2019-11-07

## 2019-11-07 NOTE — PROGRESS NOTES
History of Present Illness  Gila Olmstead. is a 37 y.o. male who presents today for management of    Chief Complaint   Patient presents with    Depression    Hypertension       Depression Review:  Patient is seen for followup of depression. Treatment includes Effexor and no other therapies. Ongoing symptoms include depressed mood and anhedonia. He denies recurrent thoughts of death and suicidal thoughts without plan. He experiences the following side effects from the treatment: none.     Problem List  Patient Active Problem List    Diagnosis Date Noted    Neurofibromatosis, type 1 ([address] 05/13/2019    History of gastrointestinal stromal tumor (GIST) 05/13/2019    Stenosis of right internal carotid artery 05/13/2019    MAURICIO on CPAP 05/13/2019    Generalized pruritus 05/13/2019    LVH (left ventricular hypertrophy) 02/27/2018    Obesity, morbid ([address] 01/29/2018    Glioma of optic nerve ([address] 01/29/2018    Blind left eye 01/29/2018    Lung nodule 01/29/2018    Low testosterone in male 01/29/2018    Stenosis of right middle cerebral artery 01/29/2018    Hypercholesteremia 01/29/2018    Severe concentric left ventricular hypertrophy 01/29/2018    Microalbuminuria 01/29/2018    Hypothyroidism 08/21/2013    HTN (hypertension) 08/21/2013    Obesity (BMI 30-39.9) 08/21/2013    Anemia, unspecified 08/21/2013    Thyroid disorder        Past Medical History  Past Medical History:   Diagnosis Date    Blindness of left eye     Brain tumor (benign) ([address]     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9    Carotid artery occlusion with infarction ([address] 10/2017    CVA (cerebral vascular accident) ([address] 2016    H/O malignant gastrointestinal stromal tumor (GIST)     S/P Jejunal surgery 2018    Hypertension     Hypothyroid     Obesity     Thyroid disorder         Surgical History  Past Surgical History:   Procedure Laterality Date    HX OTHER SURGICAL  2/19/10    excision of lipoma, left temporal lesion, left face cyst, right face lesion, removal of 5 skin tags    HX OTHER SURGICAL      GIST removal    HX TUMOR REMOVAL      tumor removed from head when 1years old        Current Medications  Current Outpatient Medications   Medication Sig    ARIPiprazole (ABILIFY) 2 mg tablet Take 1 Tab by mouth nightly.  testosterone (ANDROGEL) 20.25 mg/1.25 gram (1.62 %) gel 60.75 mg by TransDERmal route daily.  levothyroxine (SYNTHROID) 112 mcg tablet TAKE 1 TABLET BY MOUTH EVERY DAY BEFORE BREAKFAST    metoprolol succinate (TOPROL-XL) 25 mg XL tablet TAKE 1/2 OF A TABLET BY MOUTH EVERY DAY.  hydroCHLOROthiazide (HYDRODIURIL) 12.5 mg tablet TAKE 1 TABLET BY MOUTH DAILY.  cetirizine HCl (ZYRTEC PO) Take 1 Tab by mouth daily (after breakfast).  diphenhydrAMINE (BENADRYL) 25 mg capsule Take 25 mg by mouth daily.  venlafaxine-SR (EFFEXOR-XR) 150 mg capsule TAKE 1 CAPSULE BY MOUTH EVERY DAY (Patient taking differently: 100 mg. TAKE 1 CAPSULE BY MOUTH EVERY DAY)    aspirin delayed-release 81 mg tablet Take 1 Tab by mouth daily.  atorvastatin (LIPITOR) 80 mg tablet Take 1 Tab by mouth daily.  fluticasone (FLONASE) 50 mcg/actuation nasal spray One spray each nostril ldaly    cholecalciferol (VITAMIN D3) 1,000 unit tablet Take 1 Tab by mouth daily.  diclofenac (VOLTAREN) 1 % gel Apply  to affected area four (4) times daily. (Patient taking differently: Apply 2 g to affected area four (4) times daily as needed for Cough, Diarrhea, Fever, Nausea or Pain.)    baclofen (LIORESAL) 20 mg tablet 20 mg two (2) times a day. No current facility-administered medications for this visit.         Allergies/Drug Reactions  Allergies   Allergen Reactions    Chocolate [Cocoa] Nausea Only    Bupropion Drowsiness        Family History  Family History   Problem Relation Age of Onset    Hypertension Mother     Hypertension Father     Diabetes Father     Cancer Father         colon cancer Social History  Social History     Socioeconomic History    Marital status:      Spouse name: Not on file    Number of children: Not on file    Years of education: Not on file    Highest education level: Not on file   Occupational History    Not on file   Social Needs    Financial resource strain: Not on file    Food insecurity:     Worry: Not on file     Inability: Not on file    Transportation needs:     Medical: Not on file     Non-medical: Not on file   Tobacco Use    Smoking status: Never Smoker    Smokeless tobacco: Never Used   Substance and Sexual Activity    Alcohol use: No    Drug use: No    Sexual activity: Not Currently   Lifestyle    Physical activity:     Days per week: Not on file     Minutes per session: Not on file    Stress: Not on file   Relationships    Social connections:     Talks on phone: Not on file     Gets together: Not on file     Attends Taoism service: Not on file     Active member of club or organization: Not on file     Attends meetings of clubs or organizations: Not on file     Relationship status: Not on file    Intimate partner violence:     Fear of current or ex partner: Not on file     Emotionally abused: Not on file     Physically abused: Not on file     Forced sexual activity: Not on file   Other Topics Concern    Not on file   Social History Narrative    Not on file       Review of Systems  Negative except as mentioned in HPI      Physical Exam  Vital signs:   Vitals:    11/07/19 1340   BP: 115/81   Pulse: 70   Resp: 16   Temp: 98.2 °F (36.8 °C)   TempSrc: Oral   SpO2: 99%   Weight: 190 lb (86.2 kg)   Height: 5' 4\" (1.626 m)       General: alert, oriented, not in distress  Eyes: clear conjunctivae, anicteric sclerae, full and equal ROMs  Chest/Lungs: clear breath sounds, no wheezing or crackles  Heart: normal rate, regular rhythm, no murmur  Extremities: no focal deformities, no edema  Neuro: AAOx3, CN's grossly intact  Skin: no visible abnormalities    Assessment/Plan:    1. Recurrent depression ([facility]  - currently active  - continue Effexor  - stop Wellbutrin due to drowsiness  - start ARIPiprazole (ABILIFY) 2 mg tablet; Take 1 Tab by mouth nightly. Dispense: 30 Tab; Refill: 0    2. Hypothyroidism due to acquired atrophy of thyroid  - stable  - TSH 3RD GENERATION; Future    3. Hypercholesteremia  - controlled  - CBC WITH AUTOMATED DIFF; Future  - LIPID PANEL; Future  - METABOLIC PANEL, COMPREHENSIVE; Future    4. Essential hypertension  - controlled  - CBC WITH AUTOMATED DIFF; Future  - METABOLIC PANEL, COMPREHENSIVE; Future  - URINALYSIS W/ RFLX MICROSCOPIC; Future    5. Low testosterone in male  - endocrinology follw-up    6. MAURICIO on CPAP    7. Neurofibromatosis, type 1 ([facility]  - neurology follow-up    8. Glioma of optic nerve [facility])  - neurology follow-up    9. Hyperglycemia  - HEMOGLOBIN A1C WITH EAG; Future  - METABOLIC PANEL, COMPREHENSIVE; Future    10. Vitamin D deficiency  - VITAMIN D, 25 HYDROXY; Future      Follow-up and Dispositions    · Return in about 4 weeks (around 12/5/2019) for follow-up. I have discussed the diagnosis with the patient and the intended plan as seen in the above orders. The patient has received an after-visit summary and questions were answered concerning future plans. I have discussed medication side effects and warnings with the patient as well. I have reviewed the plan of care with the patient, accepted their input and they are in agreement with the treatment goals.        Becky Sanches MD  November 7, 2019

## 2019-11-07 NOTE — PROGRESS NOTES
Winnie Arias. is a 37 y.o. male who presents today for Depression. Already had flu shot. 1. Have you been to the ER, urgent care clinic since your last visit? Hospitalized since your last visit? ER, earache, treated with prednisone    2. Have you seen or consulted any other health care providers outside of the [address] [place] [address] since your last visit? Include any pap smears or colon screening. Neurovascular. Health Maintenance reviewed.     Health Maintenance Due   Topic Date Due    Influenza Age 5 to Adult  08/01/2019

## 2019-11-08 ENCOUNTER — TELEPHONE (OUTPATIENT)
Dept: FAMILY MEDICINE CLINIC | Age: 44
End: 2019-11-08

## 2019-11-08 PROBLEM — E11.9 TYPE 2 DIABETES MELLITUS WITHOUT COMPLICATION, WITHOUT LONG-TERM CURRENT USE OF INSULIN (HCC): Status: ACTIVE | Noted: 2019-11-08

## 2019-11-08 LAB
25(OH)D3 SERPL-MCNC: 46.6 NG/ML (ref 30–100)
CHOLEST SERPL-MCNC: 126 MG/DL
EST. AVERAGE GLUCOSE BLD GHB EST-MCNC: 154 MG/DL
HBA1C MFR BLD: 7 % (ref 4.2–5.6)
HDLC SERPL-MCNC: 34 MG/DL (ref 40–60)
HDLC SERPL: 3.7 {RATIO} (ref 0–5)
LDLC SERPL CALC-MCNC: 59.4 MG/DL (ref 0–100)
LIPID PROFILE,FLP: ABNORMAL
TRIGL SERPL-MCNC: 163 MG/DL (ref ?–150)
VLDLC SERPL CALC-MCNC: 32.6 MG/DL

## 2019-11-08 NOTE — TELEPHONE ENCOUNTER
Spoke with patients father, in regards to labs and diabetic diet. Dr. Chloe Morton will talk about treatment options during next OV in Dec.  Candi Judahjavier. Verbalized understanding and will talk to his son about diabetic diet, etc..

## 2019-11-08 NOTE — TELEPHONE ENCOUNTER
----- Message from Boogie Friedman MD sent at 11/8/2019  9:52 AM EST -----  HbA1c 7% consistent with newly diagnosed DM. Will discuss with patient on follow-up 12/8/19. Please advise patient about lifestyle modification, mainly diet.

## 2019-11-08 NOTE — PROGRESS NOTES
HbA1c 7% consistent with newly diagnosed DM. Will discuss with patient on follow-up 12/8/19. Please advise patient about lifestyle modification, mainly diet.

## 2019-11-19 ENCOUNTER — PATIENT OUTREACH (OUTPATIENT)
Dept: FAMILY MEDICINE CLINIC | Age: 44
End: 2019-11-19

## 2019-11-19 NOTE — PROGRESS NOTES
Complex Case Management      Date/Time:  2019 3:28 PM    Method of communication with patient:phone    Nurse Navigator (NN) contacted the caregiver (Mr. Garrick Hashimoto., listed on [facility]) by telephone to perform Ambulatory Care Coordination. Verified name and  with  Oriana Godfrey Sr. as identifiers. Provided introduction to self, and explanation of the Ambulatory Care Manager's role. Reviewed most recent clinic visit w/ Mr. Garrick Hashimoto. who verbalized understanding.  Oriana Godfrey Sr. given an opportunity to ask questions. Top Challenges reviewed with the patient   1. Pre-diabetes. Last A1c on 19 was 7.0. Mr. Donold Rinne patient only eats candy, does not want to eat fruits/vegetables  2. Home safety - patient wants to return to pre-stroke abilities and go hunting. Mr. Luz Marina Jasso removed all bullets from the home and had another conversation with patient on 19 about how he is unable to safely handle a gun with only hand. Mr. Donold Rinne that he does not feel patient would intentionally hurt himself but might unintentionally cause injury to himself or others due to ability to only use right hand. Mr. Garrick Hashimoto. agrees to contact the PCP office or the Ambulatory Care Manager for questions related to their healthcare. Provided contact information for future reference. Disease Specific:   N/A    Home Health Active: No. Patient has personal care 6 hrs /day for 5 days a week. [facility]    DME Active: Yes Patient has electric wheelchair    Barriers to care? Patient has hx of depression, has occasional short term memory loss, left hemiparesis    Advance Care Planning:   Does patient have an Advance Directive:  not on file  Mr. Donold Rinne that he is patient's [address]    Medication(s):   Medication reconciliation was performed with Mr. Oriana Godfrey Sr., who verbalizes understanding of administration of home medications.   There were no barriers to obtaining medications identified at this time. Referral to Pharm D needed: no     Current Outpatient Medications   Medication Sig    ARIPiprazole (ABILIFY) 2 mg tablet Take 1 Tab by mouth nightly.  testosterone (ANDROGEL) 20.25 mg/1.25 gram (1.62 %) gel 60.75 mg by TransDERmal route daily.  levothyroxine (SYNTHROID) 112 mcg tablet TAKE 1 TABLET BY MOUTH EVERY DAY BEFORE BREAKFAST (Patient taking differently: Take 112 mcg by mouth six (6) days a week. Patient does not take on Sundays)    metoprolol succinate (TOPROL-XL) 25 mg XL tablet TAKE 1/2 OF A TABLET BY MOUTH EVERY DAY.  hydroCHLOROthiazide (HYDRODIURIL) 12.5 mg tablet TAKE 1 TABLET BY MOUTH DAILY.  cetirizine HCl (ZYRTEC PO) Take 1 Tab by mouth daily (after breakfast).  diphenhydrAMINE (BENADRYL) 25 mg capsule Take 25 mg by mouth daily.  venlafaxine-SR (EFFEXOR-XR) 150 mg capsule TAKE 1 CAPSULE BY MOUTH EVERY DAY (Patient taking differently: 150 mg. TAKE 1 CAPSULE BY MOUTH EVERY DAY)    atorvastatin (LIPITOR) 80 mg tablet Take 1 Tab by mouth daily.  cholecalciferol (VITAMIN D3) 1,000 unit tablet Take 1 Tab by mouth daily.  diclofenac (VOLTAREN) 1 % gel Apply  to affected area four (4) times daily. (Patient taking differently: Apply 2 g to affected area four (4) times daily as needed for Cough, Diarrhea, Fever, Nausea or Pain.)    baclofen (LIORESAL) 20 mg tablet 20 mg two (2) times a day. 1 tab in morning  2 tabs in evening    aspirin delayed-release 81 mg tablet Take 1 Tab by mouth daily.  fluticasone (FLONASE) 50 mcg/actuation nasal spray One spray each nostril ldaly     No current facility-administered medications for this visit.         [facility] follow up appointment(s):   Future Appointments   Date Time Provider Nanda Pappas   12/5/2019  1:30 PM MD FRANSISCO Leger SCHED        Non-[facility] follow up appointment(s): NA    Goals      Follows diet recommendations to achieve A1c below 7.0      Caregiver restricts concentrated sweet intake  Encourages water intake instead of sweet drinks  Encourages fresh fruits and vegetables

## 2019-11-25 ENCOUNTER — PATIENT OUTREACH (OUTPATIENT)
Dept: FAMILY MEDICINE CLINIC | Age: 44
End: 2019-11-25

## 2019-11-25 NOTE — PROGRESS NOTES
Follow up Dx:  Ambulatory Care Coordination. Nurse Navigator(NN) contacted the patient's father, Mr. Aubrey Hannon Sr. by telephone to perform follow up assessment. Verified  and address with Mr. Aubrey Hannon Sr as identifiers, he states that patient has gone out with his personal care aide. States patietn is \"doing good\". Medication:   Mr. Aubrey Hannon Sr verbalizes understanding of administration of home medications, states no medication changes, no new medications. There were no barriers to obtaining medications identified at this time. Instructions :  Patient reminded that there are physicians on call 24 hours a day / 7 days a week (M-F 5pm to 8am and from Friday 5pm until Monday 8am for the weekend) should the patient have questions or concerns. NN provided contact information for future reference. PCP/Specialist follow up:  PCP on 19. [facility] social worker on 19    Patient has appointment with dermatologist today. Mr. Aubrey Hannon Sr given an opportunity to ask questions. No other clinical/social/functional needs noted. Mr. Alonzo Dunlap agrees to contact the PCP office for questions related to their healthcare.

## 2019-12-05 ENCOUNTER — TELEPHONE (OUTPATIENT)
Dept: FAMILY MEDICINE CLINIC | Age: 44
End: 2019-12-05

## 2019-12-05 ENCOUNTER — OFFICE VISIT (OUTPATIENT)
Dept: FAMILY MEDICINE CLINIC | Age: 44
End: 2019-12-05

## 2019-12-05 ENCOUNTER — DOCUMENTATION ONLY (OUTPATIENT)
Dept: FAMILY MEDICINE CLINIC | Age: 44
End: 2019-12-05

## 2019-12-05 VITALS
HEART RATE: 67 BPM | DIASTOLIC BLOOD PRESSURE: 76 MMHG | TEMPERATURE: 97.3 F | BODY MASS INDEX: 32.27 KG/M2 | HEIGHT: 64 IN | SYSTOLIC BLOOD PRESSURE: 120 MMHG | OXYGEN SATURATION: 95 % | RESPIRATION RATE: 18 BRPM | WEIGHT: 189 LBS

## 2019-12-05 DIAGNOSIS — F33.9 RECURRENT DEPRESSION (HCC): ICD-10-CM

## 2019-12-05 DIAGNOSIS — G89.29 CHRONIC LEFT SHOULDER PAIN: Primary | ICD-10-CM

## 2019-12-05 DIAGNOSIS — E03.4 HYPOTHYROIDISM DUE TO ACQUIRED ATROPHY OF THYROID: Chronic | ICD-10-CM

## 2019-12-05 DIAGNOSIS — M25.512 CHRONIC LEFT SHOULDER PAIN: Primary | ICD-10-CM

## 2019-12-05 RX ORDER — TRAMADOL HYDROCHLORIDE 50 MG/1
50 TABLET ORAL
Qty: 20 TAB | Refills: 0 | Status: SHIPPED | OUTPATIENT
Start: 2019-12-05 | End: 2020-01-04

## 2019-12-05 RX ORDER — LEVOTHYROXINE SODIUM 100 UG/1
100 TABLET ORAL
Qty: 90 TAB | Refills: 1 | Status: SHIPPED | OUTPATIENT
Start: 2019-12-05 | End: 2020-02-25

## 2019-12-05 RX ORDER — ARIPIPRAZOLE 5 MG/1
5 TABLET ORAL
Qty: 30 TAB | Refills: 0 | Status: SHIPPED | OUTPATIENT
Start: 2019-12-05 | End: 2020-02-24

## 2019-12-05 NOTE — PROGRESS NOTES
Patient's father called in and was asking if while the patient is at his appointment if Hansa Welch could print out demographics and a medication list so he can take it over to [facility], [facility]. he would appreciate it. Please advise/Documentation Only.

## 2019-12-05 NOTE — PROGRESS NOTES
Colleen Latham. is a 53 y.o. male who presents today for results, dm follow up      1. Have you been to the ER, urgent care clinic since your last visit? Hospitalized since your last visit? no    2. Have you seen or consulted any other health care providers outside of the [address] [place] [address] since your last visit? Include any pap smears or colon screening. no     Health Maintenance reviewed.     Health Maintenance Due   Topic Date Due    FOOT EXAM Q1  11/27/1983    EYE EXAM RETINAL OR DILATED  11/27/1983    MICROALBUMIN Q1  02/01/2019

## 2019-12-05 NOTE — PROGRESS NOTES
History of Present Illness  Gila Medrano. is a 53 y.o. male who presents today for management of    Chief Complaint   Patient presents with    Diabetes       Diabetes Mellitus:  Last hemoglobin A1c was elevated at 7%. Patient has no prior history of diabetes. Patient reports that he has cut back on sweets since being diagnosed with DM. Shoulder Pain  Patient complains of left side shoulder pain. The symptoms began several months ago Course of symptoms since onset has been stable. Patient has left-sided weakness from CVA. Depression Review:  Patient is seen for followup of depression. Treatment includes Effexor and recently started Abilify. Patient does not feel any difference. Ongoing symptoms include depressed mood and insomnia. He denies feelings of worthlessness/guilt and recurrent thoughts of death. He experiences the following side effects from the treatment: none.     Problem List  Patient Active Problem List    Diagnosis Date Noted    Type 2 diabetes mellitus without complication, without long-term current use of insulin ([facility] [address] 11/08/2019    Neurofibromatosis, type 1 ([facility] [address] 05/13/2019    History of gastrointestinal stromal tumor (GIST) 05/13/2019    Stenosis of right internal carotid artery 05/13/2019    MAURICIO on CPAP 05/13/2019    Generalized pruritus 05/13/2019    LVH (left ventricular hypertrophy) 02/27/2018    Obesity, morbid ([address] 01/29/2018    Glioma of optic nerve ([address] 01/29/2018    Blind left eye 01/29/2018    Lung nodule 01/29/2018    Low testosterone in male 01/29/2018    Stenosis of right middle cerebral artery 01/29/2018    Hypercholesteremia 01/29/2018    Severe concentric left ventricular hypertrophy 01/29/2018    Microalbuminuria 01/29/2018    Hypothyroidism 08/21/2013    HTN (hypertension) 08/21/2013    Obesity (BMI 30-39.9) 08/21/2013    Anemia, unspecified 08/21/2013    Thyroid disorder        Past Medical History  Past Medical History:   Diagnosis Date    Blindness of left eye     Brain tumor (benign) ([facility] [address]     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9    Carotid artery occlusion with infarction ([facility] [address] 10/2017    CVA (cerebral vascular accident) ([facility] [address] 2016    H/O malignant gastrointestinal stromal tumor (GIST)     S/P Jejunal surgery 2018    Hypertension     Hypothyroid     Obesity     Thyroid disorder         Surgical History  Past Surgical History:   Procedure Laterality Date    HX OTHER SURGICAL  2/19/10    excision of lipoma, left temporal lesion, left face cyst, right face lesion, removal of 5 skin tags    HX OTHER SURGICAL      GIST removal    HX TUMOR REMOVAL      tumor removed from head when 1years old        Current Medications  Current Outpatient Medications   Medication Sig    levothyroxine (SYNTHROID) 100 mcg tablet Take 1 Tab by mouth Daily (before breakfast).  ARIPiprazole (ABILIFY) 5 mg tablet Take 1 Tab by mouth nightly.  traMADol (ULTRAM) 50 mg tablet Take 1 Tab by mouth every eight (8) hours as needed for Pain for up to 30 days. Max Daily Amount: 150 mg.    testosterone (ANDROGEL) 20.25 mg/1.25 gram (1.62 %) gel 60.75 mg by TransDERmal route daily.  metoprolol succinate (TOPROL-XL) 25 mg XL tablet TAKE 1/2 OF A TABLET BY MOUTH EVERY DAY.  hydroCHLOROthiazide (HYDRODIURIL) 12.5 mg tablet TAKE 1 TABLET BY MOUTH DAILY.  cetirizine HCl (ZYRTEC PO) Take 1 Tab by mouth daily (after breakfast).  diphenhydrAMINE (BENADRYL) 25 mg capsule Take 25 mg by mouth daily.  venlafaxine-SR (EFFEXOR-XR) 150 mg capsule TAKE 1 CAPSULE BY MOUTH EVERY DAY (Patient taking differently: 150 mg. TAKE 1 CAPSULE BY MOUTH EVERY DAY)    aspirin delayed-release 81 mg tablet Take 1 Tab by mouth daily.  atorvastatin (LIPITOR) 80 mg tablet Take 1 Tab by mouth daily.  fluticasone (FLONASE) 50 mcg/actuation nasal spray One spray each nostril ldaly    cholecalciferol (VITAMIN D3) 1,000 unit tablet Take 1 Tab by mouth daily.  diclofenac (VOLTAREN) 1 % gel Apply  to affected area four (4) times daily. (Patient taking differently: Apply 2 g to affected area four (4) times daily as needed for Cough, Diarrhea, Fever, Nausea or Pain.)    baclofen (LIORESAL) 20 mg tablet 20 mg two (2) times a day. 1 tab in morning  2 tabs in evening     No current facility-administered medications for this visit.         Allergies/Drug Reactions  Allergies   Allergen Reactions    Chocolate [Cocoa] Nausea Only    Bupropion Drowsiness        Family History  Family History   Problem Relation Age of Onset    Hypertension Mother     Hypertension Father     Diabetes Father     Cancer Father         colon cancer        Social History  Social History     Socioeconomic History    Marital status:      Spouse name: Not on file    Number of children: Not on file    Years of education: Not on file    Highest education level: Not on file   Occupational History    Not on file   Social Needs    Financial resource strain: Not on file    Food insecurity:     Worry: Not on file     Inability: Not on file    Transportation needs:     Medical: Not on file     Non-medical: Not on file   Tobacco Use    Smoking status: Never Smoker    Smokeless tobacco: Never Used   Substance and Sexual Activity    Alcohol use: No    Drug use: No    Sexual activity: Not Currently   Lifestyle    Physical activity:     Days per week: Not on file     Minutes per session: Not on file    Stress: Not on file   Relationships    Social connections:     Talks on phone: Not on file     Gets together: Not on file     Attends Congregational service: Not on file     Active member of club or organization: Not on file     Attends meetings of clubs or organizations: Not on file     Relationship status: Not on file    Intimate partner violence:     Fear of current or ex partner: Not on file     Emotionally abused: Not on file     Physically abused: Not on file     Forced sexual activity: Not on file   Other Topics Concern    Not on file   Social History Narrative    Not on file       Review of Systems  Review of Systems   Constitutional: Negative for chills, fever and weight loss. HENT: Negative for ear pain and hearing loss. Eyes: Negative for blurred vision. Respiratory: Negative for shortness of breath. Cardiovascular: Negative for chest pain and palpitations. Gastrointestinal: Positive for nausea. Negative for vomiting. Genitourinary: Negative for dysuria. Musculoskeletal: Positive for joint pain. Skin: Negative for rash. Neurological: Positive for headaches. Negative for dizziness. Psychiatric/Behavioral: Positive for depression. Negative for suicidal ideas. Physical Exam  Vital signs:   Vitals:    12/05/19 1337   BP: 120/76   Pulse: 67   Resp: 18   Temp: 97.3 °F (36.3 °C)   TempSrc: Oral   SpO2: 95%   Weight: 189 lb (85.7 kg)   Height: 5' 4\" (1.626 m)       General: alert, oriented, not in distress, wheelchair-bound  Eyes: clear conjunctivae, anicteric sclerae, full and equal ROMs  Chest/Lungs: clear breath sounds, no wheezing or crackles  Heart: normal rate, regular rhythm, no murmur  Extremities: no focal deformities, no edema  Skin: no visible abnormalities      Laboratory/Tests:  Component      Latest Ref Rng & Units 11/7/2019 11/7/2019 11/7/2019 11/7/2019           2:23 PM  2:23 PM  2:23 PM  2:23 PM   WBC      4.6 - 13.2 K/uL   10.3    RBC      4.70 - 5.50 M/uL   4.77    HGB      13.0 - 16.0 g/dL   14.2    HCT      36.0 - 48.0 %   43.9    MCV      74.0 - 97.0 FL   92.0    MCH      24.0 - 34.0 PG   29.8    MCHC      31.0 - 37.0 g/dL   32.3    RDW      11.6 - 14.5 %   14.8 (H)    PLATELET      211 - 977 K/uL   373    MPV      9.2 - 11.8 FL   11.2    NEUTROPHILS      40 - 73 %   56    LYMPHOCYTES      21 - 52 %   33    MONOCYTES      3 - 10 %   7    EOSINOPHILS      0 - 5 %   3    BASOPHILS      0 - 2 %   1    ABS.  NEUTROPHILS      1.8 - 8.0 K/UL   5.8 ABS. LYMPHOCYTES      0.9 - 3.6 K/UL   3.4    ABS. MONOCYTES      0.05 - 1.2 K/UL   0.7    ABS. EOSINOPHILS      0.0 - 0.4 K/UL   0.3    ABS. BASOPHILS      0.0 - 0.1 K/UL   0.1    DF         AUTOMATED    Sodium      136 - 145 mmol/L    141   Potassium      3.5 - 5.5 mmol/L    4.4   Chloride      100 - 111 mmol/L    106   CO2      21 - 32 mmol/L    28   Anion gap      3.0 - 18 mmol/L    7   Glucose      74 - 99 mg/dL    83   BUN      7.0 - 18 MG/DL    10   Creatinine      0.6 - 1.3 MG/DL    0.78   BUN/Creatinine ratio      12 - 20      13   GFR est AA      >60 ml/min/1.73m2    >60   GFR est non-AA      >60 ml/min/1.73m2    >60   Calcium      8.5 - 10.1 MG/DL    9.9   Bilirubin, total      0.2 - 1.0 MG/DL    0.5   ALT (SGPT)      16 - 61 U/L    41   AST      10 - 38 U/L    21   Alk. phosphatase      45 - 117 U/L    123 (H)   Protein, total      6.4 - 8.2 g/dL    8.3 (H)   Albumin      3.4 - 5.0 g/dL    4.7   Globulin      2.0 - 4.0 g/dL    3.6   A-G Ratio      0.8 - 1.7      1.3   Cholesterol, total      <200 MG/      Triglyceride      <150 MG/ (H)      HDL Cholesterol      40 - 60 MG/DL 34 (L)      LDL, calculated      0 - 100 MG/DL 59.4      VLDL, calculated      MG/DL 32.6      CHOL/HDL Ratio      0 - 5.0   3.7      Hemoglobin A1c, (calculated)      4.2 - 5.6 %  7.0 (H)     Est. average glucose      mg/dL  154       Component      Latest Ref Rng & Units 11/7/2019 11/7/2019           2:23 PM  2:23 PM   TSH      0.36 - 3.74 uIU/mL  0.20 (L)   Vitamin D 25-Hydroxy      30 - 100 ng/mL 46.6        Assessment/Plan:    1. Hypothyroidism due to acquired atrophy of thyroid  - TSH low  - decrease levothyroxine (SYNTHROID) 100 mcg tablet; Take 1 Tab by mouth Daily (before breakfast). Dispense: 90 Tab; Refill: 1    2. Recurrent depression ([address]  - currently active  - continue Effexor  - increase ARIPiprazole (ABILIFY) 5 mg tablet; Take 1 Tab by mouth nightly. Dispense: 30 Tab; Refill: 0    3.  Chronic left shoulder pain  - 2/2 left-sided weakness/muscle atrophy from CVA  - traMADol (ULTRAM) 50 mg tablet; Take 1 Tab by mouth every eight (8) hours as needed for Pain for up to 30 days. Max Daily Amount: 150 mg. Dispense: 20 Tab; Refill: 0        Follow-up and Dispositions    · Return in about 2 months (around 2/5/2020) for ROV. I have discussed the diagnosis with the patient and the intended plan as seen in the above orders. The patient has received an after-visit summary and questions were answered concerning future plans. I have discussed medication side effects and warnings with the patient as well. I have reviewed the plan of care with the patient, accepted their input and they are in agreement with the treatment goals.        Kaylan Fabian MD  December 5, 2019

## 2019-12-24 ENCOUNTER — PATIENT OUTREACH (OUTPATIENT)
Dept: FAMILY MEDICINE CLINIC | Age: 44
End: 2019-12-24

## 2019-12-24 RX ORDER — ATORVASTATIN CALCIUM 80 MG/1
TABLET, FILM COATED ORAL
Qty: 90 TAB | Refills: 3 | Status: SHIPPED | OUTPATIENT
Start: 2019-12-24 | End: 2020-05-26 | Stop reason: SDUPTHER

## 2019-12-24 NOTE — PROGRESS NOTES
NN attempted to contact patient/caregiver at listed number. VM left identifying self. Direct contact information given with request for return call. Patient followed up with PCP on 12/5/19. EMR reviewed. Nurse Navigator will continue to follow.

## 2020-01-14 ENCOUNTER — PATIENT OUTREACH (OUTPATIENT)
Dept: FAMILY MEDICINE CLINIC | Facility: CLINIC | Age: 45
End: 2020-01-14

## 2020-01-14 NOTE — PROGRESS NOTES
Ambulatory Care Coordination    Patient Outreached Attempted. Received patient's voicemail box. Message left requesting call back. I did speak with someone on the cell number. After identifying myself I was asked to call back and leave a message. I did call back but it kept disconnecting, did not go to voicemail.

## 2020-01-23 ENCOUNTER — PATIENT OUTREACH (OUTPATIENT)
Dept: FAMILY MEDICINE CLINIC | Age: 45
End: 2020-01-23

## 2020-01-23 NOTE — PROGRESS NOTES
ACM attempted to contact patient/patient's father (listed on PHI). VM left identifying self. Direct contact information given with request for return call. EMR reviewed. ACM will continue to follow.

## 2020-01-30 ENCOUNTER — PATIENT OUTREACH (OUTPATIENT)
Dept: FAMILY MEDICINE CLINIC | Age: 45
End: 2020-01-30

## 2020-01-30 NOTE — PROGRESS NOTES
[facility] Follow up         Follow up Dx:  [address] contacted the patient's father (listed on PHI) by telephone to perform follow up assessment. Verified  and address with  Viviana Almendarez Sr as identifiers. Medication:   Performed medication reconciliation with Mr. Viviana Almendarez Sr who verbalizes understanding of administration of home medications. There were no barriers to obtaining medications identified at this time. Mr. Frederick Serrano states that patient \"is still depressed but getting better\", Diana Mackey is trying to be more independent. States patient is looking around for a place 'of his own'; \"is getting out and about a little bit more\"; catches the [employer] bus (alone) to go to therapy/counselor appointments.  Viviana Almendarez Sr given an opportunity to ask questions. No other clinical/social/functional needs noted. Nhung Almendarez Sr reminded that there are physicians on call 24 hours a day / 7 days a week (M-F 5pm to 8am and from Friday 5pm until Monday 8am for the weekend) should the patient have questions or concerns. [facility] provided contact information for future reference. Mr. Frederick Serrano agrees to contact the PCP office for questions related to their healthcare.

## 2020-02-19 ENCOUNTER — PATIENT OUTREACH (OUTPATIENT)
Dept: CASE MANAGEMENT | Age: 45
End: 2020-02-19

## 2020-02-19 NOTE — PROGRESS NOTES
Patient has graduated from the Complex Case Management  program on 2/19/20. Patient's symptoms are stable at this time. Patient/family has the ability to self-manage. Care management goals have been completed at this time. No further ACM follow up scheduled. Goals Addressed                 This Visit's Progress     COMPLETED: Follows diet recommendations to achieve A1c below 7.0        Caregiver restricts concentrated sweet intake  Encourages water intake instead of sweet drinks  Encourages fresh fruits and vegetables            Pt has ACM's contact information for any further questions, concerns, or needs.   Patients upcoming visits:    Future Appointments   Date Time Provider Nanda Pappas   2/24/2020  1:45 PM Divya Hewitt MD [facility]

## 2020-02-24 ENCOUNTER — OFFICE VISIT (OUTPATIENT)
Dept: FAMILY MEDICINE CLINIC | Age: 45
End: 2020-02-24

## 2020-02-24 VITALS
OXYGEN SATURATION: 98 % | SYSTOLIC BLOOD PRESSURE: 100 MMHG | DIASTOLIC BLOOD PRESSURE: 70 MMHG | RESPIRATION RATE: 16 BRPM | WEIGHT: 195 LBS | HEIGHT: 64 IN | TEMPERATURE: 98 F | BODY MASS INDEX: 33.29 KG/M2 | HEART RATE: 62 BPM

## 2020-02-24 DIAGNOSIS — E11.9 TYPE 2 DIABETES MELLITUS WITHOUT COMPLICATION, WITHOUT LONG-TERM CURRENT USE OF INSULIN (HCC): Primary | ICD-10-CM

## 2020-02-24 DIAGNOSIS — C72.30 GLIOMA OF OPTIC NERVE (HCC): ICD-10-CM

## 2020-02-24 DIAGNOSIS — Q85.01 NEUROFIBROMATOSIS, TYPE 1 (HCC): ICD-10-CM

## 2020-02-24 DIAGNOSIS — Z11.59 ENCOUNTER FOR SCREENING FOR OTHER VIRAL DISEASES: ICD-10-CM

## 2020-02-24 DIAGNOSIS — I10 ESSENTIAL HYPERTENSION: Chronic | ICD-10-CM

## 2020-02-24 DIAGNOSIS — E78.00 HYPERCHOLESTEREMIA: ICD-10-CM

## 2020-02-24 DIAGNOSIS — E03.4 HYPOTHYROIDISM DUE TO ACQUIRED ATROPHY OF THYROID: Chronic | ICD-10-CM

## 2020-02-24 DIAGNOSIS — I69.354 HEMIPARESIS AFFECTING LEFT SIDE AS LATE EFFECT OF CEREBROVASCULAR ACCIDENT (CVA) (HCC): ICD-10-CM

## 2020-02-24 PROBLEM — L29.9 GENERALIZED PRURITUS: Status: RESOLVED | Noted: 2019-05-13 | Resolved: 2020-02-24

## 2020-02-24 LAB — HBA1C MFR BLD HPLC: 5 %

## 2020-02-24 RX ORDER — BACLOFEN 20 MG/1
20 TABLET ORAL 3 TIMES DAILY
Qty: 270 TAB | Refills: 3 | Status: SHIPPED | OUTPATIENT
Start: 2020-02-24 | End: 2020-08-06 | Stop reason: SDUPTHER

## 2020-02-24 NOTE — PROGRESS NOTES
History of Present Illness  Gila Lim is a 53 y.o. male who presents today for management of    Chief Complaint   Patient presents with    Diabetes    Depression     Diabetes Mellitus:  He has diabetes mellitus, hypertension, hyperlipidemia and obesity. Patient has history of stroke due to carotid artery occlusion in 2017. He has residual left-sided weakness. He had a recent MRI and MRA of the brain which showed right ICA occlusion. 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 Diabetic ROS - medication compliance: compliant all of the time, diabetic diet compliance: compliant all of the time, home glucose monitoring: is not performed, further diabetic ROS: no polyuria or polydipsia, no chest pain, dyspnea or TIA's, no numbness, tingling or pain in extremities. Lab review: labs are reviewed, up to date and normal.     Depression Review:  Patient is seen for followup of depression. Treatment includes Effexor and no other therapies. Ongoing symptoms include none. He denies depressed mood, anhedonia, weight loss, weight gain and insomnia. He experiences the following side effects from the treatment: none.     Pa  Problem List  Patient Active Problem List    Diagnosis Date Noted    Hemiparesis affecting left side as late effect of cerebrovascular accident (CVA) ([facility] [address] 02/24/2020    Type 2 diabetes mellitus without complication, without long-term current use of insulin ([address] 11/08/2019    Neurofibromatosis, type 1 ([address] 05/13/2019    History of gastrointestinal stromal tumor (GIST) 05/13/2019    Stenosis of right internal carotid artery 05/13/2019    MAURICIO on CPAP 05/13/2019    LVH (left ventricular hypertrophy) 02/27/2018    Obesity, morbid ([address] 01/29/2018    Glioma of optic nerve ([address] 01/29/2018    Blind left eye 01/29/2018    Lung nodule 01/29/2018    Low testosterone in male 01/29/2018    Stenosis of right middle cerebral artery 01/29/2018    Hypercholesteremia 01/29/2018    Severe concentric left ventricular hypertrophy 01/29/2018    Microalbuminuria 01/29/2018    Hypothyroidism 08/21/2013    HTN (hypertension) 08/21/2013    Obesity (BMI 30-39.9) 08/21/2013    Anemia, unspecified 08/21/2013       Past Medical History  Past Medical History:   Diagnosis Date    Blindness of left eye     Brain tumor (benign) ([facility] [address]     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9    Carotid artery occlusion with infarction ([facility] [address] 10/2017    CVA (cerebral vascular accident) ([facility] [address] 2016    H/O malignant gastrointestinal stromal tumor (GIST)     S/P Jejunal surgery 2018    Hypertension     Hypothyroid     Obesity     Thyroid disorder         Surgical History  Past Surgical History:   Procedure Laterality Date    HX OTHER SURGICAL  2/19/10    excision of lipoma, left temporal lesion, left face cyst, right face lesion, removal of 5 skin tags    HX OTHER SURGICAL      GIST removal    HX TUMOR REMOVAL      tumor removed from head when 1years old        Current Medications  Current Outpatient Medications   Medication Sig    baclofen (LIORESAL) 20 mg tablet Take 1 Tab by mouth three (3) times daily.  atorvastatin (LIPITOR) 80 mg tablet TAKE 1 TABLET BY MOUTH EVERY DAY.  levothyroxine (SYNTHROID) 100 mcg tablet Take 1 Tab by mouth Daily (before breakfast).  testosterone (ANDROGEL) 20.25 mg/1.25 gram (1.62 %) gel 60.75 mg by TransDERmal route daily.  metoprolol succinate (TOPROL-XL) 25 mg XL tablet TAKE 1/2 OF A TABLET BY MOUTH EVERY DAY.  hydroCHLOROthiazide (HYDRODIURIL) 12.5 mg tablet TAKE 1 TABLET BY MOUTH DAILY.  cetirizine HCl (ZYRTEC PO) Take 1 Tab by mouth daily (after breakfast).  diphenhydrAMINE (BENADRYL) 25 mg capsule Take 25 mg by mouth daily.  venlafaxine-SR (EFFEXOR-XR) 150 mg capsule TAKE 1 CAPSULE BY MOUTH EVERY DAY (Patient taking differently: 150 mg.  TAKE 1 CAPSULE BY MOUTH EVERY DAY)    aspirin delayed-release 81 mg tablet Take 1 Tab by mouth daily.  fluticasone (FLONASE) 50 mcg/actuation nasal spray One spray each nostril ldaly    cholecalciferol (VITAMIN D3) 1,000 unit tablet Take 1 Tab by mouth daily.  diclofenac (VOLTAREN) 1 % gel Apply  to affected area four (4) times daily. (Patient taking differently: Apply 2 g to affected area four (4) times daily as needed for Cough, Diarrhea, Fever, Nausea or Pain.)     No current facility-administered medications for this visit.         Allergies/Drug Reactions  Allergies   Allergen Reactions    Chocolate [Cocoa] Nausea Only    Bupropion Drowsiness        Family History  Family History   Problem Relation Age of Onset    Hypertension Mother     Hypertension Father     Diabetes Father     Cancer Father         colon cancer        Social History  Social History     Socioeconomic History    Marital status:      Spouse name: Not on file    Number of children: Not on file    Years of education: Not on file    Highest education level: Not on file   Occupational History    Not on file   Social Needs    Financial resource strain: Not on file    Food insecurity:     Worry: Not on file     Inability: Not on file    Transportation needs:     Medical: Not on file     Non-medical: Not on file   Tobacco Use    Smoking status: Never Smoker    Smokeless tobacco: Never Used   Substance and Sexual Activity    Alcohol use: No    Drug use: No    Sexual activity: Not Currently   Lifestyle    Physical activity:     Days per week: Not on file     Minutes per session: Not on file    Stress: Not on file   Relationships    Social connections:     Talks on phone: Not on file     Gets together: Not on file     Attends Sabianist service: Not on file     Active member of club or organization: Not on file     Attends meetings of clubs or organizations: Not on file     Relationship status: Not on file    Intimate partner violence:     Fear of current or ex partner: Not on file     Emotionally abused: Not on file     Physically abused: Not on file     Forced sexual activity: Not on file   Other Topics Concern    Not on file   Social History Narrative    Not on file       Review of Systems  Review of Systems   Constitutional: Negative. HENT: Negative. Respiratory: Negative. Cardiovascular: Negative. Gastrointestinal: Negative. Musculoskeletal: Negative. Neurological: Positive for weakness. Negative for dizziness, seizures, loss of consciousness and headaches. Psychiatric/Behavioral: Positive for depression. The patient is not nervous/anxious. Physical Exam  Vital signs:   Vitals:    02/24/20 1354   BP: 100/70   Pulse: 62   Resp: 16   Temp: 98 °F (36.7 °C)   TempSrc: Oral   SpO2: 98%   Weight: 195 lb (88.5 kg)   Height: 5' 4\" (1.626 m)       General: alert, oriented, not in distress  Eyes: clear conjunctivae, anicteric sclerae, full and equal ROMs  Chest/Lungs: clear breath sounds, no wheezing or crackles  Heart: normal rate, regular rhythm, no murmur  Extremities: no focal deformities, no edema  Neuro: AAOx3, CN's grossly intact  Skin: no visible abnormalities  Foot exam: no open cuts, ulcers or wounds. DP full and equal. Sensation intact with 5.07 g monofilament wire bilaterally.       Laboratory/Tests:  Component      Latest Ref Rng & Units 2/24/2020 11/7/2019           2:08 PM  2:23 PM   Hemoglobin A1c, (calculated)      4.2 - 5.6 %  7.0 (H)   Est. average glucose      mg/dL  154   Hemoglobin A1c (POC)      % 5.0      Component      Latest Ref Rng & Units 11/7/2019 11/7/2019 11/7/2019 11/7/2019           2:23 PM  2:23 PM  2:23 PM  2:23 PM   WBC      4.6 - 13.2 K/uL  10.3     RBC      4.70 - 5.50 M/uL  4.77     HGB      13.0 - 16.0 g/dL  14.2     HCT      36.0 - 48.0 %  43.9     MCV      74.0 - 97.0 FL  92.0     MCH      24.0 - 34.0 PG  29.8     MCHC      31.0 - 37.0 g/dL  32.3     RDW      11.6 - 14.5 %  14.8 (H)     PLATELET 135 - 420 K/uL  373     MPV      9.2 - 11.8 FL  11.2     NEUTROPHILS      40 - 73 %  56     LYMPHOCYTES      21 - 52 %  33     MONOCYTES      3 - 10 %  7     EOSINOPHILS      0 - 5 %  3     BASOPHILS      0 - 2 %  1     ABS. NEUTROPHILS      1.8 - 8.0 K/UL  5.8     ABS. LYMPHOCYTES      0.9 - 3.6 K/UL  3.4     ABS. MONOCYTES      0.05 - 1.2 K/UL  0.7     ABS. EOSINOPHILS      0.0 - 0.4 K/UL  0.3     ABS. BASOPHILS      0.0 - 0.1 K/UL  0.1     DF        AUTOMATED     Sodium      136 - 145 mmol/L    141   Potassium      3.5 - 5.5 mmol/L    4.4   Chloride      100 - 111 mmol/L    106   CO2      21 - 32 mmol/L    28   Anion gap      3.0 - 18 mmol/L    7   Glucose      74 - 99 mg/dL    83   BUN      7.0 - 18 MG/DL    10   Creatinine      0.6 - 1.3 MG/DL    0.78   BUN/Creatinine ratio      12 - 20      13   GFR est AA      >60 ml/min/1.73m2    >60   GFR est non-AA      >60 ml/min/1.73m2    >60   Calcium      8.5 - 10.1 MG/DL    9.9   Bilirubin, total      0.2 - 1.0 MG/DL    0.5   ALT (SGPT)      16 - 61 U/L    41   AST      10 - 38 U/L    21   Alk. phosphatase      45 - 117 U/L    123 (H)   Protein, total      6.4 - 8.2 g/dL    8.3 (H)   Albumin      3.4 - 5.0 g/dL    4.7   Globulin      2.0 - 4.0 g/dL    3.6   A-G Ratio      0.8 - 1.7      1.3   Cholesterol, total      <200 MG/      Triglyceride      <150 MG/ (H)      HDL Cholesterol      40 - 60 MG/DL 34 (L)      LDL, calculated      0 - 100 MG/DL 59.4      VLDL, calculated      MG/DL 32.6      CHOL/HDL Ratio      0 - 5.0   3.7      TSH      0.36 - 3.74 uIU/mL   0.20 (L)      Component      Latest Ref Rng & Units 11/7/2019           2:23 PM   Vitamin D 25-Hydroxy      30 - 100 ng/mL 46.6       Assessment/Plan:    1. Type 2 diabetes mellitus without complication, without long-term current use of insulin (HCC)  - diet-controlled  - AMB POC HEMOGLOBIN I0V 5%  - METABOLIC PANEL, BASIC; Future   -  DIABETES FOOT EXAM    2.  Hypothyroidism due to acquired atrophy of thyroid  - TSH 3RD GENERATION; Future    3. Hypercholesteremia  - controlled    4. Essential hypertension  - controlled    5. Neurofibromatosis, type 1 ([address]  - stable  - neurology follow-up    6. Glioma of optic nerve ([address]  - s/p excision at age 9  - neurology follow-up    7. Hemiparesis affecting left side as late effect of cerebrovascular accident (CVA) ([address]  - continue aspirin, statin and baclofen  - REFERRAL TO ACUPUNCTURE    8. Encounter for screening for other viral diseases  - HCV AB W/RFLX TO [place]; Future        Follow-up and Dispositions    · Return in about 3 months (around 5/24/2020) for DM, HTN, HLD. I have discussed the diagnosis with the patient and the intended plan as seen in the above orders. The patient has received an after-visit summary and questions were answered concerning future plans. I have discussed medication side effects and warnings with the patient as well. I have reviewed the plan of care with the patient, accepted their input and they are in agreement with the treatment goals.        Anya Wyman MD  February 24, 2020

## 2020-02-24 NOTE — PROGRESS NOTES
Segundo Colindres. presents today for DM     Is someone accompanying this pt? no    Is the patient using any DME equipment during OV? Mechanical wheelchair, cane     Coordination of Care:  1. Have you been to the ER, urgent care clinic since your last visit? Hospitalized since your last visit? no    2. Have you seen or consulted any other health care providers outside of the [address] [place] [address] since your last visit? Include any pap smears or colon screening. no    Depression Screening:  3 most recent PHQ Screens 2/24/2020   Little interest or pleasure in doing things Not at all   Feeling down, depressed, irritable, or hopeless Several days   Total Score PHQ 2 1       Learning Assessment:  Learning Assessment 2/24/2020   PRIMARY LEARNER Patient   HIGHEST LEVEL OF EDUCATION - PRIMARY LEARNER  GRADUATED HIGH SCHOOL OR GED   BARRIERS PRIMARY LEARNER NONE   CO-LEARNER CAREGIVER No   PRIMARY LANGUAGE ENGLISH   LEARNER PREFERENCE PRIMARY LISTENING   ANSWERED BY patient   RELATIONSHIP SELF       Abuse Screening:  Abuse Screening Questionnaire 2/24/2020   Do you ever feel afraid of your partner? N   Are you in a relationship with someone who physically or mentally threatens you? N   Is it safe for you to go home? Y       Fall Risk  Fall Risk Assessment, last 12 mths 11/7/2019   Able to walk? Yes   Fall in past 12 months? Yes   Fall with injury? No   Number of falls in past 12 months 2   Fall Risk Score 2       Health Maintenance reviewed and discussed and ordered per Provider. Health Maintenance Due   Topic Date Due    Foot Exam Q1  11/27/1983    Eye Exam Retinal or Dilated  11/27/1983    MICROALBUMIN Q1  02/01/2019   .

## 2020-02-25 ENCOUNTER — TELEPHONE (OUTPATIENT)
Dept: FAMILY MEDICINE CLINIC | Age: 45
End: 2020-02-25

## 2020-02-25 DIAGNOSIS — E03.4 HYPOTHYROIDISM DUE TO ACQUIRED ATROPHY OF THYROID: Chronic | ICD-10-CM

## 2020-02-25 LAB
BUN SERPL-MCNC: 9 MG/DL (ref 6–24)
BUN/CREAT SERPL: 14 (ref 9–20)
CALCIUM SERPL-MCNC: 10.1 MG/DL (ref 8.7–10.2)
CHLORIDE SERPL-SCNC: 98 MMOL/L (ref 96–106)
CO2 SERPL-SCNC: 28 MMOL/L (ref 20–29)
CREAT SERPL-MCNC: 0.64 MG/DL (ref 0.76–1.27)
GLUCOSE SERPL-MCNC: 81 MG/DL (ref 65–99)
HCV AB S/CO SERPL IA: <0.1 S/CO RATIO (ref 0–0.9)
HCV AB SERPL QL IA: NORMAL
POTASSIUM SERPL-SCNC: 4.2 MMOL/L (ref 3.5–5.2)
SODIUM SERPL-SCNC: 141 MMOL/L (ref 134–144)
TSH SERPL DL<=0.005 MIU/L-ACNC: 0.19 UIU/ML (ref 0.45–4.5)

## 2020-02-25 RX ORDER — LEVOTHYROXINE SODIUM 75 UG/1
75 TABLET ORAL
Qty: 90 TAB | Refills: 0 | Status: SHIPPED | OUTPATIENT
Start: 2020-02-25 | End: 2020-07-29

## 2020-02-25 NOTE — PROGRESS NOTES
TSH is persistently low. Further decrease levothyroxine from 100mcg to 75 mcg daily. Rx sent to pharmacy.

## 2020-02-25 NOTE — TELEPHONE ENCOUNTER
----- Message from Shae Ryan MD sent at 2/25/2020  3:22 PM EST -----  TSH is persistently low. Further decrease levothyroxine from 100mcg to 75 mcg daily. Rx sent to pharmacy.

## 2020-04-23 ENCOUNTER — TELEPHONE (OUTPATIENT)
Dept: FAMILY MEDICINE CLINIC | Age: 45
End: 2020-04-23

## 2020-04-23 NOTE — TELEPHONE ENCOUNTER
Patient's father Mr. Wing December that patient will not be able to attend jury due to his health condition. Mr. Lucille Blizzard would like to know if  can fax  a letter stating the patient health condition and will not be able to do jury duty. Fax number [phone] [phone].

## 2020-05-26 ENCOUNTER — VIRTUAL VISIT (OUTPATIENT)
Dept: FAMILY MEDICINE CLINIC | Age: 45
End: 2020-05-26

## 2020-05-26 DIAGNOSIS — I10 ESSENTIAL HYPERTENSION: Chronic | ICD-10-CM

## 2020-05-26 DIAGNOSIS — Z00.00 MEDICARE ANNUAL WELLNESS VISIT, INITIAL: ICD-10-CM

## 2020-05-26 DIAGNOSIS — R47.81 SLURRED SPEECH: ICD-10-CM

## 2020-05-26 DIAGNOSIS — Z13.39 SCREENING FOR ALCOHOLISM: ICD-10-CM

## 2020-05-26 DIAGNOSIS — I66.01 STENOSIS OF RIGHT MIDDLE CEREBRAL ARTERY: ICD-10-CM

## 2020-05-26 DIAGNOSIS — E11.9 TYPE 2 DIABETES MELLITUS WITHOUT COMPLICATION, WITHOUT LONG-TERM CURRENT USE OF INSULIN (HCC): Primary | ICD-10-CM

## 2020-05-26 DIAGNOSIS — Z85.09 HISTORY OF GASTROINTESTINAL STROMAL TUMOR (GIST): ICD-10-CM

## 2020-05-26 DIAGNOSIS — E03.4 HYPOTHYROIDISM DUE TO ACQUIRED ATROPHY OF THYROID: Chronic | ICD-10-CM

## 2020-05-26 DIAGNOSIS — E78.00 HYPERCHOLESTEREMIA: ICD-10-CM

## 2020-05-26 DIAGNOSIS — I69.354 HEMIPARESIS AFFECTING LEFT SIDE AS LATE EFFECT OF CEREBROVASCULAR ACCIDENT (CVA) (HCC): ICD-10-CM

## 2020-05-26 RX ORDER — ATORVASTATIN CALCIUM 40 MG/1
40 TABLET, FILM COATED ORAL DAILY
Qty: 90 TAB | Refills: 0
Start: 2020-05-26 | End: 2020-10-12

## 2020-05-26 NOTE — PROGRESS NOTES
Sugey Davis is a 53 y.o. male who was seen by synchronous (real-time) audio-video technology using doxy. me on 5/26/2020. Location of the patient: Home    Location of the provider: [facility]    Consent:  He and/or health care decision maker is aware that that he may receive a bill for this telehealth service, depending on his insurance coverage, and has provided verbal consent to proceed: Yes    Subjective: Sugey Davis is a 53 y.o. male who presents today for management of    Chief Complaint   Patient presents with    Hypertension    Cholesterol Problem    Diabetes    Depression       Diabetes Mellitus:  He has diabetes mellitus, and  hypertension, hyperlipidemia and obesity. Patient has history of stroke due to carotid artery occlusion in 2017. He has residual left-sided weakness. He had a recent MRI and MRA of the brain which showed right ICA occlusion. 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 Diabetic ROS - diabetic diet compliance: compliant most of the time, home glucose monitoring: is not performed, further diabetic ROS: no polyuria or polydipsia, no chest pain, dyspnea or TIA's, no numbness, tingling or pain in extremities. Lab review: labs are reviewed, up to date and normal.   Patient's father reports that patient has chronic slurred speech. No change in speech.      Problem List  Patient Active Problem List    Diagnosis Date Noted    Hemiparesis affecting left side as late effect of cerebrovascular accident (CVA) ([address] 02/24/2020    Type 2 diabetes mellitus without complication, without long-term current use of insulin ([address] 11/08/2019    Neurofibromatosis, type 1 ([address] 05/13/2019    History of gastrointestinal stromal tumor (GIST) 05/13/2019    Stenosis of right internal carotid artery 05/13/2019    MAURICIO on CPAP 05/13/2019    LVH (left ventricular hypertrophy) 02/27/2018    Obesity, morbid ([facility] 01/29/2018    Glioma of optic nerve ([facility] 01/29/2018    Blind left eye 01/29/2018    Lung nodule 01/29/2018    Low testosterone in male 01/29/2018    Stenosis of right middle cerebral artery 01/29/2018    Hypercholesteremia 01/29/2018    Severe concentric left ventricular hypertrophy 01/29/2018    Microalbuminuria 01/29/2018    Hypothyroidism 08/21/2013    HTN (hypertension) 08/21/2013    Obesity (BMI 30-39.9) 08/21/2013    Anemia, unspecified 08/21/2013       Current Medications  Current Outpatient Medications   Medication Sig    atorvastatin (LIPITOR) 40 mg tablet Take 1 Tab by mouth daily.  venlafaxine-SR (EFFEXOR-XR) 150 mg capsule TAKE 1 CAPSULE BY MOUTH EVERY DAY    levothyroxine (SYNTHROID) 75 mcg tablet Take 1 Tab by mouth Daily (before breakfast).  baclofen (LIORESAL) 20 mg tablet Take 1 Tab by mouth three (3) times daily.  metoprolol succinate (TOPROL-XL) 25 mg XL tablet TAKE 1/2 OF A TABLET BY MOUTH EVERY DAY.  cetirizine HCl (ZYRTEC PO) Take 1 Tab by mouth daily (after breakfast).  aspirin delayed-release 81 mg tablet Take 1 Tab by mouth daily.  cholecalciferol (VITAMIN D3) 1,000 unit tablet Take 1 Tab by mouth daily.  testosterone (ANDROGEL) 20.25 mg/1.25 gram (1.62 %) gel 60.75 mg by TransDERmal route daily.  diphenhydrAMINE (BENADRYL) 25 mg capsule Take 25 mg by mouth daily.  fluticasone (FLONASE) 50 mcg/actuation nasal spray One spray each nostril ldaly    diclofenac (VOLTAREN) 1 % gel Apply  to affected area four (4) times daily. (Patient taking differently: Apply 2 g to affected area four (4) times daily as needed for Cough, Diarrhea, Fever, Nausea or Pain.)     No current facility-administered medications for this visit.         Allergies/Drug Reactions  Allergies   Allergen Reactions    Chocolate [Cocoa] Nausea Only    Bupropion Drowsiness        Social History  Social History     Socioeconomic History    Marital status:      Spouse name: Not on file    Number of children: Not on file    Years of education: Not on file    Highest education level: Not on file   Occupational History    Not on file   Social Needs    Financial resource strain: Not on file    Food insecurity     Worry: Not on file     Inability: Not on file    Transportation needs     Medical: Not on file     Non-medical: Not on file   Tobacco Use    Smoking status: Never Smoker    Smokeless tobacco: Never Used   Substance and Sexual Activity    Alcohol use: No    Drug use: No    Sexual activity: Not Currently   Lifestyle    Physical activity     Days per week: Not on file     Minutes per session: Not on file    Stress: Not on file   Relationships    Social connections     Talks on phone: Not on file     Gets together: Not on file     Attends Spiritism service: Not on file     Active member of club or organization: Not on file     Attends meetings of clubs or organizations: Not on file     Relationship status: Not on file    Intimate partner violence     Fear of current or ex partner: Not on file     Emotionally abused: Not on file     Physically abused: Not on file     Forced sexual activity: Not on file   Other Topics Concern    Not on file   Social History Narrative    Not on file       Review of Systems  Review of Systems   Constitutional: Negative. HENT: Negative. Respiratory: Negative. Cardiovascular: Negative. Gastrointestinal: Negative. Genitourinary: Negative. Musculoskeletal: Negative. Neurological: Positive for speech change. Psychiatric/Behavioral: Positive for depression.         Objective:     General: alert, cooperative, no distress   Mental  status: mental status: alert, oriented to person, place, and time, normal mood, behavior, speech, dress, motor activity, and thought processes   Resp: resp: normal effort and no respiratory distress   Neuro: neuro: no gross deficits   Skin: skin: no discoloration or lesions of concern on visible areas     Due to this being a TeleHealth evaluation, many elements of the physical examination are unable to be assessed. Assessment & Plan:   1. Type 2 diabetes mellitus without complication, without long-term current use of insulin ([address]  - diet-controlled    2. Chronic slurring of speech, Hemiparesis affecting left side as late effect of cerebrovascular accident (CVA) ([facility]  - REFERRAL TO SPEECH THERAPY    3. Essential hypertension  - controlled  - d/c HCTZ  - continue metoprolol 12.5mg daily  - start home blood pressure monitoring    4. Hypercholesteremia  - last LDL 59  - decrease atorvastatin (LIPITOR) 40 mg tablet; Take 1 Tab by mouth daily. Dispense: 90 Tab; Refill: 0  - check lipid panel in 6 months    5. Hypothyroidism due to acquired atrophy of thyroid  - continue levothyroxine as per endocrinology    6. History of gastrointestinal stromal tumor (GIST)  - d/c PPI - no GERD symptoms    7. Stenosis of right middle cerebral artery      Follow-up and Dispositions    · Return in about 3 months (around 8/26/2020) for DM, HTN, HLD. We discussed the expected course, resolution and complications of the diagnosis(es) in detail. Medication risks, benefits, costs, interactions, and alternatives were discussed as indicated. I advised him to contact the office if his condition worsens, changes or fails to improve as anticipated. He expressed understanding with the diagnosis(es) and plan. Pursuant to the emergency declaration under the [facility], Count includes the [facility] waiver authority and the [employer] and [employer] Act, this Virtual  Visit was conducted, with patient's consent, to reduce the patient's risk of exposure to COVID-19 and provide continuity of care for an established patient. Services were provided through a video synchronous discussion virtually to substitute for in-person clinic visit.     Elise Cheng MD This is an Initial Medicare Annual Wellness Exam (AWV) (Performed 12 months after IPPE or effective date of Medicare Part B enrollment, Once in a lifetime)    Consent: Shira Santillan, who was seen by synchronous (real-time) audio-video technology, and/or his healthcare decision maker, is aware that this patient-initiated, Telehealth encounter on 5/26/2020 is a billable service. While AWVs are fully covered by Medicare, any services rendered on this date that are not included in an AWV are subject to additional billing, with coverage as determined by his insurance carrier. He is aware that he may receive a bill for any such additional services and has provided verbal consent to proceed: Yes. I have reviewed the patient's medical history in detail and updated the computerized patient record. History     Patient Active Problem List   Diagnosis Code    Hypothyroidism E03.9    HTN (hypertension) I10    Obesity (BMI 30-39. 9) E66.9    Anemia, unspecified D64.9    Obesity, morbid (HCC) E66.01    Glioma of optic nerve (HCC) C72.30    Blind left eye H54.40    Lung nodule R91.1    Low testosterone in male R79.89    Stenosis of right middle cerebral artery I66.01    Hypercholesteremia E78.00    Severe concentric left ventricular hypertrophy I51.7    Microalbuminuria R80.9    LVH (left ventricular hypertrophy) I51.7    Neurofibromatosis, type 1 (HCC) Q85.01    History of gastrointestinal stromal tumor (GIST) Z85.09    Stenosis of right internal carotid artery I65.21    MAURICIO on CPAP G47.33, Z99.89    Type 2 diabetes mellitus without complication, without long-term current use of insulin (HCC) E11.9    Hemiparesis affecting left side as late effect of cerebrovascular accident (CVA) ([facility] Da     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) 10/2017    CVA (cerebral vascular accident) ([facility] 2016    H/O malignant gastrointestinal stromal tumor (GIST)     S/P Jejunal surgery 2018    Hypertension     Hypothyroid     Obesity     Thyroid disorder       Past Surgical History:   Procedure Laterality Date    HX OTHER SURGICAL  2/19/10    excision of lipoma, left temporal lesion, left face cyst, right face lesion, removal of 5 skin tags    HX OTHER SURGICAL      GIST removal    HX TUMOR REMOVAL      tumor removed from head when 1years old     Current Outpatient Medications   Medication Sig Dispense Refill    atorvastatin (LIPITOR) 40 mg tablet Take 1 Tab by mouth daily. 90 Tab 0    venlafaxine-SR (EFFEXOR-XR) 150 mg capsule TAKE 1 CAPSULE BY MOUTH EVERY DAY 90 Cap 1    levothyroxine (SYNTHROID) 75 mcg tablet Take 1 Tab by mouth Daily (before breakfast). 90 Tab 0    baclofen (LIORESAL) 20 mg tablet Take 1 Tab by mouth three (3) times daily. 270 Tab 3    metoprolol succinate (TOPROL-XL) 25 mg XL tablet TAKE 1/2 OF A TABLET BY MOUTH EVERY DAY. 45 Tab 11    cetirizine HCl (ZYRTEC PO) Take 1 Tab by mouth daily (after breakfast).  aspirin delayed-release 81 mg tablet Take 1 Tab by mouth daily. 90 Tab 3    cholecalciferol (VITAMIN D3) 1,000 unit tablet Take 1 Tab by mouth daily. 30 Tab 3    testosterone (ANDROGEL) 20.25 mg/1.25 gram (1.62 %) gel 60.75 mg by TransDERmal route daily.  diphenhydrAMINE (BENADRYL) 25 mg capsule Take 25 mg by mouth daily.  fluticasone (FLONASE) 50 mcg/actuation nasal spray One spray each nostril ldaly 1 Bottle 3    diclofenac (VOLTAREN) 1 % gel Apply  to affected area four (4) times daily.  (Patient taking differently: Apply 2 g to affected area four (4) times daily as needed for Cough, Diarrhea, Fever, Nausea or Pain.) 100 g 3     Allergies   Allergen Reactions    Chocolate [Cocoa] Nausea Only    Bupropion Drowsiness       Family History   Problem Relation Age of Onset    Hypertension Mother    Bryon Devries Hypertension Father     Diabetes Father     Cancer Father         colon cancer     Social History     Tobacco Use    Smoking status: Never Smoker    Smokeless tobacco: Never Used   Substance Use Topics    Alcohol use: No       Depression Risk Factor Screening:     3 most recent PHQ Screens 5/26/2020   Little interest or pleasure in doing things Several days   Feeling down, depressed, irritable, or hopeless More than half the days   Total Score PHQ 2 3   Trouble falling or staying asleep, or sleeping too much Several days   Feeling tired or having little energy Not at all   Poor appetite, weight loss, or overeating Nearly every day   Feeling bad about yourself - or that you are a failure or have let yourself or your family down Several days   Trouble concentrating on things such as school, work, reading, or watching TV Not at all   Moving or speaking so slowly that other people could have noticed; or the opposite being so fidgety that others notice Not at all   Thoughts of being better off dead, or hurting yourself in some way Several days   PHQ 9 Score 9   How difficult have these problems made it for you to do your work, take care of your home and get along with others Somewhat difficult       Alcohol Risk Factor Screening (MALE < 65): Do you average more than 2 drinks per night or 14 drinks a week: No    On any one occasion in the past three months have you have had more than 4 drinks containing alcohol:  No      Functional Ability and Level of Safety:   Hearing: Hearing is good. Activities of Daily Living: The home contains: handrails and grab bars  Patient needs help with:  transportation, shopping, preparing meals, laundry, housework, managing medications, dressing and bathing     Ambulation: with difficulty, uses a quad cane    Fall Risk:  Fall Risk Assessment, last 12 mths 11/7/2019   Able to walk? Yes   Fall in past 12 months? Yes   Fall with injury?  No   Number of falls in past 12 months 2   Fall Risk Score 2       Abuse Screen:  Patient is not abused    Cognitive Screening   Has your family/caregiver stated any concerns about your memory: no  Cognitive Screening: not done    Patient Care Team   Patient Care Team:  Ebonie Leon MD as PCP - General (Internal Medicine)  Ebonie Leon MD as PCP - [address] [place] [address]  [facility] Provider  Brianda Stewart MD (Neurology)  Donta Elaine MD (Ophthalmology)  Quentin Tom MD (Endocrinology)  Jennifer Pompa MD (Internal Medicine)  Mariela Zaidi MD (Neurology)  Joseph Rocha MD (Hematology and Oncology)  Elle Reeves MD (Cardiology)  Yessy Tony MD (Sleep Medicine)  Santhosh Ferguson (Podiatry)  Libertad Guzman RN as Ambulatory Care Manager    Assessment/Plan   Education and counseling provided:  Are appropriate based on today's review and evaluation  End-of-Life planning (with patient's consent)    Diagnoses and all orders for this visit:    1. Type 2 diabetes mellitus without complication, without long-term current use of insulin ([facility]    2. Hemiparesis affecting left side as late effect of cerebrovascular accident (CVA) ([address]  -     REFERRAL TO SPEECH THERAPY    3. Essential hypertension    4. Hypercholesteremia  -     atorvastatin (LIPITOR) 40 mg tablet; Take 1 Tab by mouth daily. 5. Hypothyroidism due to acquired atrophy of thyroid    6. History of gastrointestinal stromal tumor (GIST)    7. Stenosis of right middle cerebral artery    8. Slurred speech  -     REFERRAL TO SPEECH THERAPY    9. Medicare annual wellness visit, initial    10. Screening for alcoholism  -     WI ANNUAL ALCOHOL SCREEN 15 MIN         Health Maintenance Due   Topic Date Due    Eye Exam Retinal or Dilated  11/27/1983    MICROALBUMIN Q1  02/01/2019    Medicare Yearly Exam  05/13/2020         Gila Cam is a 53 y.o. male who was evaluated by an audio-video encounter for concerns as above.  Patient identification was verified prior to start of the visit. A caregiver was present when appropriate. Due to this being a TeleHealth encounter (During RNSNS-85 public health emergency), evaluation of the following organ systems was limited: Vitals/Constitutional/EENT/Resp/CV/GI//MS/Neuro/Skin/Heme-Lymph-Imm. Pursuant to the emergency declaration under the [address] [place] [address] waiver authority and the [employer] and [employer] Act, this Virtual Visit was conducted, with patient's (and/or legal guardian's) consent, to reduce the patient's risk of exposure to COVID-19 and provide necessary medical care. Services were provided through a synchronous discussion virtually to substitute for in-person clinic visit. I was in the office. The patient was at home.       Rio Chow MD

## 2020-05-26 NOTE — PATIENT INSTRUCTIONS
Medicare Wellness Visit, Male The best way to live healthy is to have a lifestyle where you eat a well-balanced diet, exercise regularly, limit alcohol use, and quit all forms of tobacco/nicotine, if applicable. Regular preventive services are another way to keep healthy. Preventive services (vaccines, screening tests, monitoring & exams) can help personalize your care plan, which helps you manage your own care. Screening tests can find health problems at the earliest stages, when they are easiest to treat. [facility] follows the current, evidence-based guidelines published by the [facility] ([facility]) when recommending preventive services for our patients. Because we follow these guidelines, sometimes recommendations change over time as research supports it. (For example, a prostate screening blood test is no longer routinely recommended for men with no symptoms). Of course, you and your doctor may decide to screen more often for some diseases, based on your risk and co-morbidities (chronic disease you are already diagnosed with). Preventive services for you include: - Medicare offers their members a free annual wellness visit, which is time for you and your primary care provider to discuss and plan for your preventive service needs. Take advantage of this benefit every year! 
-All adults over age 72 should receive the recommended pneumonia vaccines. Current USPSTF guidelines recommend a series of two vaccines for the best pneumonia protection.  
-All adults should have a flu vaccine yearly and tetanus vaccine every 10 years. 
-All adults age 48 and older should receive the shingles vaccines (series of two vaccines).       
-All adults age 38-68 who are overweight should have a diabetes screening test once every three years.  
-Other screening tests & preventive services for persons with diabetes include: an eye exam to screen for diabetic retinopathy, a kidney function test, a foot exam, and stricter control over your cholesterol.  
-Cardiovascular screening for adults with routine risk involves an electrocardiogram (ECG) at intervals determined by the provider.  
-Colorectal cancer screening should be done for adults age 54-65 with no increased risk factors for colorectal cancer. There are a number of acceptable methods of screening for this type of cancer. Each test has its own benefits and drawbacks. Discuss with your provider what is most appropriate for you during your annual wellness visit. The different tests include: colonoscopy (considered the best screening method), a fecal occult blood test, a fecal DNA test, and sigmoidoscopy. 
-All adults born between [facility] should be screened once for Hepatitis C. 
-An Abdominal Aortic Aneurysm (AAA) Screening is recommended for men age 73-68 who has ever smoked in their lifetime. Here is a list of your current Health Maintenance items (your personalized list of preventive services) with a due date: 
Health Maintenance Due Topic Date Due Leroy Eye Exam  11/27/1983  Albumin Urine Test  02/01/2019 Leroy Annual Well Visit  05/13/2020

## 2020-06-25 ENCOUNTER — TELEPHONE (OUTPATIENT)
Dept: FAMILY MEDICINE CLINIC | Age: 45
End: 2020-06-25

## 2020-06-25 DIAGNOSIS — F33.9 RECURRENT DEPRESSION (HCC): Primary | ICD-10-CM

## 2020-06-25 DIAGNOSIS — F32.A DEPRESSION, UNSPECIFIED DEPRESSION TYPE: ICD-10-CM

## 2020-06-25 NOTE — TELEPHONE ENCOUNTER
Patient's father called in and was wanting to know if Dr. Tawana Hugo could put in a referral for the patient to the [facility] so he can speak with a psychologist in regards to his depression. Patients father expressed to me that the patient has recently started talking about how he is better off not being here and wanted to know if this referral could be put in as soon as possible. Please advise.

## 2020-06-25 NOTE — TELEPHONE ENCOUNTER
Fang from [facility] scheduled pt. on 07/16 at 2:30pm with Dr. Saray Nava. Informed pt's father and wanted to thank Dr. Leeroy Lomax for putting in referral so quickly.

## 2020-07-15 ENCOUNTER — DOCUMENTATION ONLY (OUTPATIENT)
Dept: FAMILY MEDICINE CLINIC | Age: 45
End: 2020-07-15

## 2020-07-15 NOTE — PROGRESS NOTES
Patient's father called in and informed me that he will be dropping some paperwork off that needs to be filled out and sent to the [facility]. Documentation only.

## 2020-07-16 ENCOUNTER — APPOINTMENT (OUTPATIENT)
Dept: PHYSICAL THERAPY | Age: 45
End: 2020-07-16

## 2020-07-21 ENCOUNTER — HOSPITAL ENCOUNTER (OUTPATIENT)
Dept: PHYSICAL THERAPY | Age: 45
Discharge: HOME | End: 2020-07-21
Payer: MEDICARE

## 2020-07-21 PROCEDURE — 92522 EVALUATE SPEECH PRODUCTION: CPT

## 2020-07-21 PROCEDURE — 92507 TX SP LANG VOICE COMM INDIV: CPT

## 2020-07-21 NOTE — PROGRESS NOTES
DAILY TREATMENT NOTE    Patient Name: Jay Giordano  Date:2020  : 1975  [x]  Patient  Verified  Payor: Payor: BLUE CROSS MEDICARE / Plan: Moris N Danny Cramer HMO / Product Type: Managed Care Medicare /   In time: 3210  Out time: 7969  Total Treatment Time (min): 35  Visit #: 1 of 5-6    Treatment Diagnosis: Dysarthria [R47.1]  Hemiparesis affecting left side as late effect of cerebrovascular accident ([facility] [I69.354]    SUBJECTIVE  Pain Level (0-10 scale): 0  Any medication changes, allergies to medications, adverse drug reactions, diagnosis change, or new procedure performed?: [] No    [x] Yes (see summary sheet for update)    Subjective functional status/changes:   [] No changes reported  Dysarthria evaluation completed today    OBJECTIVE  Treatment provided includes:  Increase/Improve:  []  Voice Quality []  Cognitive Linguistic Skills []  Laryngeal/Pharyngeal Exercises   []  Vocal Loudness []  Reading Comprehension []  Swallowing Skills    []  Vocal Cord Function []  Auditory Comprehension []  Oral Motor Skills   []  Resonance []  Writing Skills []  Compensatory strategies    [x]  Speech Intelligibility []  Expressive Language []  Attention   [x]  Breath Support/Coord.  []  Receptive language []  Memory   [x]  Articulation []  Safety Awareness []    []  Fluency []  Word Retrieval []      Treatment Provided:  Introduced motor speech strategies    Patient/Caregiver  Education: [x] Review HEP      HEP/Handouts given: None    Pain Level (0-10 scale) post treatment: 0    ASSESSMENT   []   Improving appropriately and progressing toward goals  []   Improving slowly and progressing toward goals  []   Approximating goals/maximum potential  [x]   Continues to benefit from skilled therapy to address remaining functional deficits  []   Not progressing toward goals and plan of care will be adjusted    Patient will continue to benefit from skilled therapy to address remaining functional deficits: dysarthria    Progress towards goals / Updated goals:  Evaluation completed today, goals below    PLAN  [x]  Continue plan of care  []  Modify Goals/Treatment Plan      []  Discharge due to:  [] Other:    Short-Term Goals:  Patient will:  1. Utilize compensatory strategies (decrease rate, overarticulate, increase intensity) to increase intelligibility to >90% at word level with mod A visual/verbal cues in 4/5 trials. 2. Complete articulatory agility tasks (reading-conversation) with mod A with visual/verbal cues in 4/5 trials.     Pernell Berger SLP 7/21/2020  1:49 PM    Future Appointments   Date Time Provider Nanda Pappas   7/28/2020  1:15 PM Xu Lama A [facility]   8/4/2020  1:15 PM Xu Lama A [facility]   8/11/2020  1:15 PM Xu Lama A [facility]   8/18/2020  1:15 PM Xu Lama A [facility]   8/25/2020  1:15 PM Xu Lama A [facility]

## 2020-07-21 NOTE — PROGRESS NOTES
[facility]  [address]  Ph: [phone]  Fax: [phone]    Plan of Care/ Statement of Necessity for Speech Therapy Services    Patient name: Alan Griggs Start of Care: 2020   Referral source: George Escobar MD : 1975    Medical Diagnosis: Dysarthria [R47.1]  Hemiparesis affecting left side as late effect of cerebrovascular accident [facility]) [I69.354]   Onset Date: 2016    Treatment Diagnosis: dysarthria   Prior Hospitalization: see medical history Provider#: [id number]   Medications: Verified on Patient summary List   Comorbidities: Blind in left eye  Prior Level of Function: Independent prior to stroke, 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 Assessment/ key information:   Patient is a pleasant 53year old male referred for a speech evaluation due to continued slurred speech following a stroke in 2016. Patient attended the evaluation alone. Patient reports extensive therapy intervention at multiple inpatient and day rehab facilities. Patient reports he has not had speech therapy in several years. Patient has requested a return to speech therapy due to family reports that his speech is occasionally difficult to understand. He states his speech is worse in the evenings and with fatigue. The patient lives with his parents and enjoys swimming, taking wheelchair rides around his neighborhood, and interacting with friends and neighbors. Patient previously enjoyed scuba diving prior to his stroke. Patient reports no concerns regarding dysphagia, aphasia or cognitive deficits. The patient was assessed using the Perceptual Dysarthria Evaluation. Oral-motor exam revealed left sided weakness. AMRs were regular, SMRs were mildly slowed.  Patient demonstrates mild dysarthria characterized by decreased breath support during extended utterances, variable rate and equal stress, and occasional articulatory imprecision. Pitch, loudness, vocal quality and resonance were within normal limits. In conversation and structured tasks, patient was >90% intelligible. Patient would benefit from skilled ST services to address dysarthria for improved communication in home, social and medical settings. Problem List:   Dysarthria    Treatment Plan may include any combination of the following: Dysarthria Treatment and Patient Education      Patient / Family readiness to learn indicated by: asking questions, trying to perform skills and interest    Persons(s) to be included in education:   patient (P)    Barriers to Learning/Limitations: yes;  sensory deficits-vision/hearing/speech    Patient Goal (s): Talk slower    Patient Self Reported Health Status: good    Rehabilitation Potential: good    Short Term Goals: To be accomplished in 5-6 treatments  Goals:   1. Utilize compensatory strategies (decrease rate, overarticulate, increase intensity) to increase intelligibility to >90% at word level with mod A visual/verbal cues in 4/5 trials. 2. Complete articulatory agility tasks (reading-conversation) with mod A with visual/verbal cues in 4/5 trials. Long Term Goals: To be accomplished in 6-8 weeks   1. Demonstrate functional increase in articulation skills for effective participation in communication ADLs with mod A. Frequency / Duration: Patient to be seen 1-2 times per week for 4 weeks:    Patient/ Caregiver education and instruction: Diagnosis, prognosis, Compensatory Techniques    Certification Period: 7/21/20-8/20/20    ASHLEY Maciel 7/21/2020 1:12 PM  ______________________________________________________________________    I certify that the above Therapy Services are being furnished while the patient is under my care. I agree with the treatment plan and certify that this therapy is necessary.     [facility] Signature:____________________  Date:___________Time:_________    Please sign and return to [facility].   Ph: [phone]  Fax: [phone]

## 2020-07-25 DIAGNOSIS — K21.9 GASTROESOPHAGEAL REFLUX DISEASE, ESOPHAGITIS PRESENCE NOT SPECIFIED: ICD-10-CM

## 2020-07-26 RX ORDER — PANTOPRAZOLE SODIUM 40 MG/1
TABLET, DELAYED RELEASE ORAL
Qty: 90 TAB | Refills: 3 | OUTPATIENT
Start: 2020-07-26

## 2020-07-28 ENCOUNTER — HOSPITAL ENCOUNTER (OUTPATIENT)
Dept: PHYSICAL THERAPY | Age: 45
Discharge: HOME | End: 2020-07-28
Payer: MEDICARE

## 2020-07-28 PROCEDURE — 92507 TX SP LANG VOICE COMM INDIV: CPT

## 2020-07-28 NOTE — PROGRESS NOTES
DAILY TREATMENT NOTE    Patient Name: Mairne Solis  Date:2020  : 1975  [x]  Patient  Verified  Payor: Payor: BLUE CROSS MEDICARE / Plan: [address] HMO / Product Type: Managed Care Medicare /   In time: 4088  Out time: 2240  Total Treatment Time (min): 40  Visit #: 2 of 8-10    Treatment Diagnosis: Dysarthria [R47.1]  Hemiparesis affecting left side as late effect of cerebrovascular accident ([facility] [address] [I69.354]    SUBJECTIVE  Pain Level (0-10 scale): 0  Any medication changes, allergies to medications, adverse drug reactions, diagnosis change, or new procedure performed?: [] No    [] Yes (see summary sheet for update)    Subjective functional status/changes:   [x] No changes reported    OBJECTIVE  Treatment provided includes:  Increase/Improve:  []  Voice Quality []  Cognitive Linguistic Skills []  Laryngeal/Pharyngeal Exercises   []  Vocal Loudness []  Reading Comprehension []  Swallowing Skills    []  Vocal Cord Function []  Auditory Comprehension []  Oral Motor Skills   []  Resonance []  Writing Skills []  Compensatory strategies    []  Speech Intelligibility []  Expressive Language []  Attention   []  Breath Support/Coord.  []  Receptive language []  Memory   []  Articulation []  Safety Awareness []    []  Fluency []  Word Retrieval []      Treatment Provided:  Created personal phrases  Introduced motor speech strategies S-O-S (Speak Up, Over-articulate, Slow Down)  Produced 2-syllable words with 100% intelligibility with Micah  Produced 3-syllable words with 90% intelligibility with Micah    Patient/Caregiver  Education: [x] Review HEP      HEP/Handouts given: Read 3-syllable words in sentences    Pain Level (0-10 scale) post treatment: 0    ASSESSMENT   [x]   Improving appropriately and progressing toward goals  []   Improving slowly and progressing toward goals  []   Approximating goals/maximum potential  [x]   Continues to benefit from skilled therapy to address remaining functional deficits  []   Not progressing toward goals and plan of care will be adjusted    Patient will continue to benefit from skilled therapy to address remaining functional deficits: dysarthria    Progress towards goals / Updated goals:  Patient was engaged and cooperative throughout therapy. Required intermittent verbal cues to over-articulate in structured tasks. PLAN  [x]  Continue plan of care  []  Modify Goals/Treatment Plan      []  Discharge due to:  [] Other:    Short-Term Goals:  Patient will:  1. Utilize compensatory strategies (decrease rate, overarticulate, increase intensity) to increase intelligibility to >90% at word level with mod A visual/verbal cues in 4/5 trials. 2. Complete articulatory agility tasks (reading-conversation) with mod A with visual/verbal cues in 4/5 trials.     Sera Washburn SLP 7/28/2020  1:16 PM    Future Appointments   Date Time Provider Nanda Pappas   8/4/2020  1:15 PM Cherylle Glad A [facility]   8/11/2020  1:15 PM Cherylle Glad A [facility]   8/18/2020  1:15 PM Cherylle Glad A [facility]   8/25/2020  1:15 PM Cherylle Glad A [facility]

## 2020-07-29 DIAGNOSIS — E03.4 HYPOTHYROIDISM DUE TO ACQUIRED ATROPHY OF THYROID: Chronic | ICD-10-CM

## 2020-07-29 RX ORDER — LEVOTHYROXINE SODIUM 75 UG/1
TABLET ORAL
Qty: 90 TAB | Refills: 0 | Status: SHIPPED | OUTPATIENT
Start: 2020-07-29 | End: 2020-09-03

## 2020-08-04 ENCOUNTER — APPOINTMENT (OUTPATIENT)
Dept: PHYSICAL THERAPY | Age: 45
End: 2020-08-04

## 2020-08-06 DIAGNOSIS — I69.354 HEMIPARESIS AFFECTING LEFT SIDE AS LATE EFFECT OF CEREBROVASCULAR ACCIDENT (CVA) (HCC): ICD-10-CM

## 2020-08-11 ENCOUNTER — APPOINTMENT (OUTPATIENT)
Dept: PHYSICAL THERAPY | Age: 45
End: 2020-08-11

## 2020-08-11 RX ORDER — BACLOFEN 20 MG/1
20 TABLET ORAL 3 TIMES DAILY
Qty: 270 TAB | Refills: 3 | Status: SHIPPED | OUTPATIENT
Start: 2020-08-11 | End: 2021-08-10

## 2020-08-18 ENCOUNTER — APPOINTMENT (OUTPATIENT)
Dept: PHYSICAL THERAPY | Age: 45
End: 2020-08-18

## 2020-08-25 ENCOUNTER — APPOINTMENT (OUTPATIENT)
Dept: PHYSICAL THERAPY | Age: 45
End: 2020-08-25

## 2020-09-03 DIAGNOSIS — E03.4 HYPOTHYROIDISM DUE TO ACQUIRED ATROPHY OF THYROID: Chronic | ICD-10-CM

## 2020-09-03 RX ORDER — LEVOTHYROXINE SODIUM 75 UG/1
TABLET ORAL
Qty: 90 TAB | Refills: 0 | Status: SHIPPED | OUTPATIENT
Start: 2020-09-03 | End: 2020-09-29 | Stop reason: SDUPTHER

## 2020-09-28 DIAGNOSIS — E03.4 HYPOTHYROIDISM DUE TO ACQUIRED ATROPHY OF THYROID: Chronic | ICD-10-CM

## 2020-09-29 RX ORDER — LEVOTHYROXINE SODIUM 75 UG/1
TABLET ORAL
Qty: 90 TAB | Refills: 0 | Status: SHIPPED | OUTPATIENT
Start: 2020-09-29 | End: 2021-01-08 | Stop reason: SDUPTHER

## 2020-09-29 RX ORDER — LEVOTHYROXINE SODIUM 100 UG/1
TABLET ORAL
Qty: 90 TAB | Refills: 1 | OUTPATIENT
Start: 2020-09-29

## 2020-10-08 ENCOUNTER — VIRTUAL VISIT (OUTPATIENT)
Dept: FAMILY MEDICINE CLINIC | Age: 45
End: 2020-10-08
Payer: MEDICARE

## 2020-10-08 DIAGNOSIS — J06.9 ACUTE UPPER RESPIRATORY INFECTION: ICD-10-CM

## 2020-10-08 DIAGNOSIS — T14.8XXA HEMATOMA AND CONTUSION: Primary | ICD-10-CM

## 2020-10-08 PROCEDURE — 99441 PR PHYS/QHP TELEPHONE EVALUATION 5-10 MIN: CPT | Performed by: INTERNAL MEDICINE

## 2020-10-08 NOTE — PROGRESS NOTES
Johanny Glover is a 53 y.o. male evaluated via telephone on 10/8/2020. Location of the patient:  Home    Location of the provider: [place]    Consent:  He and/or health care decision maker is aware that that he may receive a bill for this telephone service, depending on his insurance coverage, and has provided verbal consent to proceed: Yes    I affirm this is a Patient Initiated Episode with an Established Patient who has not had a related appointment within my department in the past 7 days or scheduled within the next 24 hours. Total Time: minutes: 5-10 minutes    History of Present Illness  Gila Stoner is a 53 y.o. male who presents today for management of    Chief Complaint   Patient presents with    Other     hematoma     Informant: father, Karis Ramos    Patient fell from his wheelchair 2 weeks ago. He sustained a large bruise on the left side. He was seen at an urgent care and was told he had contusion. Bruise does not seem to be getting better. Patient has a cough and congestion  for 2 days. Denies shortness of breath. Tmax 99.5 F.     Problem List  Patient Active Problem List    Diagnosis Date Noted    Hemiparesis affecting left side as late effect of cerebrovascular accident (CVA) ([address] 02/24/2020    Type 2 diabetes mellitus without complication, without long-term current use of insulin ([address] 11/08/2019    Neurofibromatosis, type 1 ([address] 05/13/2019    History of gastrointestinal stromal tumor (GIST) 05/13/2019    Stenosis of right internal carotid artery 05/13/2019    MAURICIO on CPAP 05/13/2019    LVH (left ventricular hypertrophy) 02/27/2018    Obesity, morbid ([address] 01/29/2018    Glioma of optic nerve ([address] 01/29/2018    Blind left eye 01/29/2018    Lung nodule 01/29/2018    Low testosterone in male 01/29/2018    Stenosis of right middle cerebral artery 01/29/2018    Hypercholesteremia 01/29/2018    Severe concentric left ventricular hypertrophy 01/29/2018  Microalbuminuria 01/29/2018    Hypothyroidism 08/21/2013    HTN (hypertension) 08/21/2013    Obesity (BMI 30-39.9) 08/21/2013    Anemia, unspecified 08/21/2013       Current Medications  Current Outpatient Medications   Medication Sig    levothyroxine (SYNTHROID) 75 mcg tablet TAKE 1 TABLET BY MOUTH DAILY BEFORE BREAKFAST    baclofen (LIORESAL) 20 mg tablet Take 1 Tab by mouth three (3) times daily.  atorvastatin (LIPITOR) 40 mg tablet Take 1 Tab by mouth daily.  venlafaxine-SR (EFFEXOR-XR) 150 mg capsule TAKE 1 CAPSULE BY MOUTH EVERY DAY    testosterone (ANDROGEL) 20.25 mg/1.25 gram (1.62 %) gel 60.75 mg by TransDERmal route daily.  metoprolol succinate (TOPROL-XL) 25 mg XL tablet TAKE 1/2 OF A TABLET BY MOUTH EVERY DAY.  cetirizine HCl (ZYRTEC PO) Take 1 Tab by mouth daily (after breakfast).  diphenhydrAMINE (BENADRYL) 25 mg capsule Take 25 mg by mouth daily.  aspirin delayed-release 81 mg tablet Take 1 Tab by mouth daily.  fluticasone (FLONASE) 50 mcg/actuation nasal spray One spray each nostril ldaly    cholecalciferol (VITAMIN D3) 1,000 unit tablet Take 1 Tab by mouth daily.  diclofenac (VOLTAREN) 1 % gel Apply  to affected area four (4) times daily. (Patient taking differently: Apply 2 g to affected area four (4) times daily as needed for Cough, Diarrhea, Fever, Nausea or Pain.)     No current facility-administered medications for this visit. Review of Systems  Review of Systems   Constitutional: Negative for chills, fever, malaise/fatigue and weight loss. HENT: Positive for congestion. Respiratory: Positive for cough. Cardiovascular: Negative. Gastrointestinal: Negative. Negative for heartburn. Neurological: Negative. Assessment/Plan:    1. Hematoma and contusion  - warm compress  - monitor    2.  Acute upper respiratory infection  - symptomatic care  - consider COVID-19 testing if no improvement of symptoms after 3 days      Follow-up and Dispositions    · Return in about 4 days (around 10/12/2020) for F2F hematoma.          Mickey Cabrera MD

## 2020-10-12 ENCOUNTER — OFFICE VISIT (OUTPATIENT)
Dept: FAMILY MEDICINE CLINIC | Age: 45
End: 2020-10-12
Payer: MEDICARE

## 2020-10-12 VITALS
HEIGHT: 64 IN | RESPIRATION RATE: 15 BRPM | BODY MASS INDEX: 30.22 KG/M2 | WEIGHT: 177 LBS | TEMPERATURE: 97 F | OXYGEN SATURATION: 100 % | DIASTOLIC BLOOD PRESSURE: 79 MMHG | HEART RATE: 71 BPM | SYSTOLIC BLOOD PRESSURE: 130 MMHG

## 2020-10-12 DIAGNOSIS — E03.4 HYPOTHYROIDISM DUE TO ACQUIRED ATROPHY OF THYROID: Chronic | ICD-10-CM

## 2020-10-12 DIAGNOSIS — R22.41 LUMP OF SKIN OF RIGHT LOWER EXTREMITY: ICD-10-CM

## 2020-10-12 DIAGNOSIS — E11.9 DIET-CONTROLLED DIABETES MELLITUS (HCC): ICD-10-CM

## 2020-10-12 DIAGNOSIS — I10 ESSENTIAL HYPERTENSION: Chronic | ICD-10-CM

## 2020-10-12 DIAGNOSIS — S20.20XD CONTUSION OF TRUNK, SUBSEQUENT ENCOUNTER: Primary | ICD-10-CM

## 2020-10-12 DIAGNOSIS — J30.2 SEASONAL ALLERGIC RHINITIS, UNSPECIFIED TRIGGER: ICD-10-CM

## 2020-10-12 DIAGNOSIS — F33.9 RECURRENT DEPRESSION (HCC): ICD-10-CM

## 2020-10-12 PROCEDURE — 3044F HG A1C LEVEL LT 7.0%: CPT | Performed by: INTERNAL MEDICINE

## 2020-10-12 PROCEDURE — 99214 OFFICE O/P EST MOD 30 MIN: CPT | Performed by: INTERNAL MEDICINE

## 2020-10-12 PROCEDURE — G8432 DEP SCR NOT DOC, RNG: HCPCS | Performed by: INTERNAL MEDICINE

## 2020-10-12 PROCEDURE — 2022F DILAT RTA XM EVC RTNOPTHY: CPT | Performed by: INTERNAL MEDICINE

## 2020-10-12 PROCEDURE — G8754 DIAS BP LESS 90: HCPCS | Performed by: INTERNAL MEDICINE

## 2020-10-12 PROCEDURE — 96372 THER/PROPH/DIAG INJ SC/IM: CPT | Performed by: INTERNAL MEDICINE

## 2020-10-12 PROCEDURE — G8752 SYS BP LESS 140: HCPCS | Performed by: INTERNAL MEDICINE

## 2020-10-12 PROCEDURE — G8427 DOCREV CUR MEDS BY ELIG CLIN: HCPCS | Performed by: INTERNAL MEDICINE

## 2020-10-12 PROCEDURE — G8417 CALC BMI ABV UP PARAM F/U: HCPCS | Performed by: INTERNAL MEDICINE

## 2020-10-12 RX ORDER — VENLAFAXINE 75 MG/1
TABLET ORAL
Qty: 60 TAB | Refills: 0 | Status: SHIPPED | OUTPATIENT
Start: 2020-10-12 | End: 2020-12-15 | Stop reason: CLARIF

## 2020-10-12 RX ORDER — TRIAMCINOLONE ACETONIDE 40 MG/ML
40 INJECTION, SUSPENSION INTRA-ARTICULAR; INTRAMUSCULAR ONCE
Qty: 1 ML | Refills: 0
Start: 2020-10-12 | End: 2020-10-12

## 2020-10-12 RX ORDER — VENLAFAXINE 75 MG/1
TABLET ORAL
COMMUNITY
Start: 2020-09-03 | End: 2020-10-12 | Stop reason: SDUPTHER

## 2020-10-12 RX ORDER — ATORVASTATIN CALCIUM 80 MG/1
80 TABLET, FILM COATED ORAL DAILY
COMMUNITY
End: 2021-02-26

## 2020-10-12 NOTE — PROGRESS NOTES
Gila TORRES. presents today for paperwork, med refill   - Is someone accompanying this pt? no  - Is the patient using any durable medical equipment during office visit? cane    Coordination of Care:  1. Have you been to the ER, urgent care clinic since your last visit? Hospitalized since your last visit? no    2. Have you seen or consulted any other health care providers outside of the [address] [place] [address] since your last visit? Include any pap smears or colon screening.  Psychiatrist

## 2020-10-12 NOTE — PROGRESS NOTES
History of Present Illness  Gila العراقي. is a 53 y.o. male who presents today for management of    Chief Complaint   Patient presents with    Mass    Depression       Patient reports of improved pain on the left-sided bruise. He fell two weeks ago while riding his motorized wheelchair. Patient complains of allergy symptoms - nasal congestion, sneezing. Patient is applying at [facility] to start driving again. He is requesting some paperwork to be completed. Problem List  Patient Active Problem List    Diagnosis Date Noted    Hemiparesis affecting left side as late effect of cerebrovascular accident (CVA) ([facility] [address] 02/24/2020    Type 2 diabetes mellitus without complication, without long-term current use of insulin ([facility] [facility] 11/08/2019    Neurofibromatosis, type 1 ([facility] 05/13/2019    History of gastrointestinal stromal tumor (GIST) 05/13/2019    Stenosis of right internal carotid artery 05/13/2019    MAURICIO on CPAP 05/13/2019    LVH (left ventricular hypertrophy) 02/27/2018    Obesity, morbid ([address] 01/29/2018    Glioma of optic nerve ([facility] 01/29/2018    Blind left eye 01/29/2018    Lung nodule 01/29/2018    Low testosterone in male 01/29/2018    Stenosis of right middle cerebral artery 01/29/2018    Hypercholesteremia 01/29/2018    Severe concentric left ventricular hypertrophy 01/29/2018    Microalbuminuria 01/29/2018    Hypothyroidism 08/21/2013    HTN (hypertension) 08/21/2013    Obesity (BMI 30-39.9) 08/21/2013    Anemia, unspecified 08/21/2013       Current Medications  Current Outpatient Medications   Medication Sig    triamcinolone acetonide (Kenalog) 40 mg/mL injection 1 mL by IntraMUSCular route once for 1 dose.  atorvastatin (LIPITOR) 80 mg tablet Take 80 mg by mouth daily.     venlafaxine (EFFEXOR) 75 mg tablet TK 1 T PO BID    levothyroxine (SYNTHROID) 75 mcg tablet TAKE 1 TABLET BY MOUTH DAILY BEFORE BREAKFAST    baclofen (LIORESAL) 20 mg tablet Take 1 Tab by mouth three (3) times daily.  testosterone (ANDROGEL) 20.25 mg/1.25 gram (1.62 %) gel 60.75 mg by TransDERmal route daily.  metoprolol succinate (TOPROL-XL) 25 mg XL tablet TAKE 1/2 OF A TABLET BY MOUTH EVERY DAY.  cetirizine HCl (ZYRTEC PO) Take 1 Tab by mouth daily (after breakfast).  diphenhydrAMINE (BENADRYL) 25 mg capsule Take 25 mg by mouth daily.  aspirin delayed-release 81 mg tablet Take 1 Tab by mouth daily.  fluticasone (FLONASE) 50 mcg/actuation nasal spray One spray each nostril ldaly    cholecalciferol (VITAMIN D3) 1,000 unit tablet Take 1 Tab by mouth daily.  diclofenac (VOLTAREN) 1 % gel Apply  to affected area four (4) times daily. (Patient taking differently: Apply 2 g to affected area four (4) times daily as needed for Cough, Diarrhea, Fever, Nausea or Pain.)     No current facility-administered medications for this visit. Allergies/Drug Reactions  Allergies   Allergen Reactions    Chocolate [Cocoa] Nausea Only    Bupropion Drowsiness        Review of Systems  Review of Systems   Constitutional: Negative. HENT: Positive for congestion and sore throat. Respiratory: Negative. Cardiovascular: Negative. Gastrointestinal: Negative. Musculoskeletal: Positive for myalgias. Neurological: Negative for dizziness and headaches. Psychiatric/Behavioral: Positive for depression. Negative for substance abuse and suicidal ideas. The patient is not nervous/anxious.          Physical Exam  Vital signs:   Vitals:    10/12/20 0900   BP: 130/79   Pulse: 71   Resp: 15   Temp: 97 °F (36.1 °C)   TempSrc: Temporal   SpO2: 100%   Weight: 177 lb (80.3 kg)   Height: 5' 4\" (1.626 m)       General: alert, oriented, not in distress  Head: scalp normal, atraumatic  Eyes: pupils are equal and reactive, full and intact EOM's  Neck: supple, no JVD, no lymphadenopathy, non-palpable thyroid  Chest/Lungs: clear breath sounds, no wheezing or crackles  Heart: normal rate, regular rhythm, no murmur  Extremities: no focal deformities, no edema  Skin: contusion hematoma on the left flank area, soft round mass on the right lateral ankle  Neuro: hemiparesis on the left side    Assessment/Plan:      1. Contusion of trunk, subsequent encounter  - improving    2. Seasonal allergic rhinitis, unspecified trigger  - PO antihistamine, Flonase  - TRIAMCINOLONE ACETONIDE INJ  - triamcinolone acetonide (Kenalog) 40 mg/mL injection; 1 mL by IntraMUSCular route once for 1 dose. Dispense: 1 mL; Refill: 0    3. Recurrent depression ([address]  - psych follow-up  - venlafaxine (EFFEXOR) 75 mg tablet; TK 1 T PO BID  Dispense: 60 Tab; Refill: 0    4. Lump of skin of right lower extremity  - dermatology follow-up for excision    5. 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 Essential hypertension  - controlled  - METABOLIC PANEL, BASIC; Future    7. Diet-controlled diabetes mellitus ([facility]  - HEMOGLOBIN A1C WITH EAG; Future  - MICROALBUMIN, UR, RAND W/ MICROALB/CREAT RATIO; Future      Patient desires to start driving again. Will complete DMV form. Follow-up and Dispositions    · Return in about 3 months (around 1/12/2021). I have discussed the diagnosis with the patient and the intended plan as seen in the above orders. The patient has received an after-visit summary and questions were answered concerning future plans. I have discussed medication side effects and warnings with the patient as well. I have reviewed the plan of care with the patient, accepted their input and they are in agreement with the treatment goals.        Xander Heath MD  October 12, 2020

## 2020-10-13 ENCOUNTER — TELEPHONE (OUTPATIENT)
Dept: FAMILY MEDICINE CLINIC | Age: 45
End: 2020-10-13

## 2020-10-13 LAB
BUN SERPL-MCNC: 15 MG/DL (ref 6–24)
BUN/CREAT SERPL: 23 (ref 9–20)
CALCIUM SERPL-MCNC: 9.6 MG/DL (ref 8.7–10.2)
CHLORIDE SERPL-SCNC: 105 MMOL/L (ref 96–106)
CO2 SERPL-SCNC: 20 MMOL/L (ref 20–29)
CREAT SERPL-MCNC: 0.65 MG/DL (ref 0.76–1.27)
EST. AVERAGE GLUCOSE BLD GHB EST-MCNC: 100 MG/DL
GLUCOSE SERPL-MCNC: 79 MG/DL (ref 65–99)
HBA1C MFR BLD: 5.1 % (ref 4.8–5.6)
POTASSIUM SERPL-SCNC: 4.1 MMOL/L (ref 3.5–5.2)
SODIUM SERPL-SCNC: 143 MMOL/L (ref 134–144)
TSH SERPL DL<=0.005 MIU/L-ACNC: 0.91 UIU/ML (ref 0.45–4.5)

## 2020-10-13 NOTE — PROGRESS NOTES
[facility] [address]  Ph: [phone]  Fax: [phone]    Speech Therapy Discharge Summary    Patient name: Candelaria Benavidez Start of Care: 20   Referral source: Mari Deal MD : 1975   Medical/Treatment Diagnosis: Dysarthria [R47.1]  Hemiparesis affecting left side as late effect of cerebrovascular accident [facility]) [I69.354] Onset Date:  2016     Prior Hospitalization: see medical history Provider#: [id number]   Medications: Verified on Patient Summary List    Comorbidities: Blind in left eye  Prior Level of Function: Independent prior to stroke, 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 Status at last note/certification: New at evaluation  Status at discharge: not met - Introduced motor speech strategies S-O-S (Speak Up, Over-articulate, Slow Down)    Goal: Complete articulatory agility tasks (reading-conversation) with mod A with visual/verbal cues in 4/5 trials. Status at last note/certification: New at evaluation  Status at discharge: not met - Produced 2-syllable words with 100% intelligibility with Micah, 3-syllable words with 90% intelligibility with Micah    ASSESSMENT: Patient has abdicated from speech therapy. Per discussion via telephone, patient stated \"I will not be returning until there is a vaccine for COVID\". Will discharge accordingly.      RECOMMENDATIONS:  [x]Discontinue therapy: []Patient has reached or is progressing toward set goals      [x]Patient is non-compliant or has abdicated      []Due to lack of appreciable progress towards set goals    Morena Aguila, SLP 10/13/2020 3:28 PM

## 2020-10-13 NOTE — TELEPHONE ENCOUNTER
----- Message from Dani Bai MD sent at 10/13/2020  9:50 AM EDT -----  Labs look good. DM well-controlled on diet alone. TSH level normal. Please inform patient.

## 2020-10-22 RX ORDER — METOPROLOL SUCCINATE 25 MG/1
TABLET, EXTENDED RELEASE ORAL
Qty: 45 TAB | Refills: 11 | Status: SHIPPED | OUTPATIENT
Start: 2020-10-22 | End: 2020-12-15 | Stop reason: CLARIF

## 2020-12-07 ENCOUNTER — OFFICE VISIT (OUTPATIENT)
Dept: FAMILY MEDICINE CLINIC | Age: 45
End: 2020-12-07
Payer: MEDICARE

## 2020-12-07 VITALS
SYSTOLIC BLOOD PRESSURE: 142 MMHG | DIASTOLIC BLOOD PRESSURE: 93 MMHG | TEMPERATURE: 97 F | HEART RATE: 74 BPM | BODY MASS INDEX: 29.88 KG/M2 | OXYGEN SATURATION: 99 % | RESPIRATION RATE: 24 BRPM | HEIGHT: 64 IN | WEIGHT: 175 LBS

## 2020-12-07 DIAGNOSIS — I10 ESSENTIAL HYPERTENSION: Chronic | ICD-10-CM

## 2020-12-07 DIAGNOSIS — Z01.818 PREOP EXAMINATION: Primary | ICD-10-CM

## 2020-12-07 PROCEDURE — 99214 OFFICE O/P EST MOD 30 MIN: CPT | Performed by: STUDENT IN AN ORGANIZED HEALTH CARE EDUCATION/TRAINING PROGRAM

## 2020-12-07 NOTE — PATIENT INSTRUCTIONS
GENERAL PRE-OPERATIVE  PATIENT INSTRUCTIONS      PRE-OP APPOINTMENTS:  Please make an appointment with anesthesia and/or pre-admission testing it you have been instructed to do so. Make sure that you have any labs, EKG's, or X-rays that were ordered, performed well in advance of your surgery so that those results will be available before the time of your surgery. This will help prevent possible cancellation and rescheduling of your surgical procedure. On the day of surgery please show up at least 90 minutes in advance of your scheduled surgery time, unless instructed otherwise. DIET:  Do not eat or drink after midnight the evening before you surgery. You may take prescription medications with a sip of water on the day of surgery. ACTIVITY:  If you receive any type of sedation or general anesthesia you will not be able to drive after the procedure or leave unaccompanied. Make sure that you make appropriate arrangements for transportation home and that there is someone who can be with you for at least the first few hours at home after your surgery. MEDICATIONS:  You should stop any blood thinners, aspirin, or arthritis medications before surgery, unless otherwise directed by your physician. If you take insulin, take only half of your normal morning dose on the day of surgery. If you take tablets for diabetes, do not take them the morning of surgery unless otherwise told to do so. You may take your other prescription medications with a sip of water on the morning of surgery. QUESTIONS:  Please feel free to call your physician or the surgery center if you have any questions, and they will be glad to assist you. If you have forgotten to notify your physician of any other medical problems, prescriptions medications, or allergies, please be sure to call and let him know before the day of surgery.  If you have any other questions or concerns in regard to the surgery that you are scheduled for, be sure to ask your surgeon prior to surgery and get your questions answered.

## 2020-12-07 NOTE — PROGRESS NOTES
Wilner Chong. presents today for   Chief Complaint   Patient presents with    Pre-op Exam     Excision of soft tissue mass, right ankle Dr. Shyann Hyde        Is someone accompanying this pt? no    Is the patient using any DME equipment during OV? Walker cane    Depression Screening:  3 most recent PHQ Screens 12/7/2020   Little interest or pleasure in doing things Not at all   Feeling down, depressed, irritable, or hopeless Not at all   Total Score PHQ 2 0   Trouble falling or staying asleep, or sleeping too much -   Feeling tired or having little energy -   Poor appetite, weight loss, or overeating -   Feeling bad about yourself - or that you are a failure or have let yourself or your family down -   Trouble concentrating on things such as school, work, reading, or watching TV -   Moving or speaking so slowly that other people could have noticed; or the opposite being so fidgety that others notice -   Thoughts of being better off dead, or hurting yourself in some way -   PHQ 9 Score -   How difficult have these problems made it for you to do your work, take care of your home and get along with others -       Learning Assessment:  Learning Assessment 2/24/2020   PRIMARY LEARNER Patient   HIGHEST LEVEL OF EDUCATION - PRIMARY LEARNER  GRADUATED HIGH SCHOOL OR GED   BARRIERS PRIMARY LEARNER NONE   CO-LEARNER CAREGIVER No   PRIMARY LANGUAGE ENGLISH   LEARNER PREFERENCE PRIMARY LISTENING   ANSWERED BY patient   RELATIONSHIP SELF       Abuse Screening:  Abuse Screening Questionnaire 2/24/2020   Do you ever feel afraid of your partner? N   Are you in a relationship with someone who physically or mentally threatens you? N   Is it safe for you to go home? Y       Fall Risk  Fall Risk Assessment, last 12 mths 11/7/2019   Able to walk? Yes   Fall in past 12 months? Yes   Fall with injury?  No   Number of falls in past 12 months 2   Fall Risk Score 2       Health Maintenance reviewed and discussed and ordered per Provider. Health Maintenance Due   Topic Date Due    Eye Exam Retinal or Dilated  11/27/1983    MICROALBUMIN Q1  02/01/2019    Lipid Screen  11/07/2020   . Coordination of Care:  1. Have you been to the ER, urgent care clinic since your last visit? Hospitalized since your last visit? no    2. Have you seen or consulted any other health care providers outside of the [address] [place] [address] since your last visit? Include any pap smears or colon screening.  no      Last  Checked na/  Last UDS Checked n/a   Last Pain contract signed: n/a

## 2020-12-07 NOTE — PROGRESS NOTES
Chief Complaint:  Consult for pre-operative evaluation. History of Present Illness:  Mr. Torie Azar is a 50 y.o. 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 Presents today for the pre-operative evaluation. Procedure to be performed:Excision of soft tissue mass - right ankle  Procedure to be performed by: Dr. Toya Garcia  Procedure to be performed on: 12/18/2020    Patient states that he has had this nodule on his right ankle for 1-1/2 to 2 years. States that this is painful to touch. States that this been stable in size for a while already.     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9    Carotid artery occlusion with infarction ([address] 10/2017    CVA (cerebral vascular accident) ([address] 2016    H/O malignant gastrointestinal stromal tumor (GIST)     S/P Jejunal surgery 2018    Hypertension     Hypothyroid     Obesity     Thyroid disorder      Past Surgical History:   Procedure Laterality Date    HX OTHER SURGICAL  2/19/10    excision of lipoma, left temporal lesion, left face cyst, right face lesion, removal of 5 skin tags    HX OTHER SURGICAL      GIST removal    HX TUMOR REMOVAL      tumor removed from head when 1years old     Family History   Problem Relation Age of Onset    Hypertension Mother     Hypertension Father     Diabetes Father  Cancer Father         colon cancer     Social History     Socioeconomic History    Marital status:      Spouse name: Not on file    Number of children: Not on file    Years of education: Not on file    Highest education level: Not on file   Occupational History    Not on file   Social Needs    Financial resource strain: Not on file    Food insecurity     Worry: Not on file     Inability: Not on file    Transportation needs     Medical: Not on file     Non-medical: Not on file   Tobacco Use    Smoking status: Never Smoker    Smokeless tobacco: Never Used   Substance and Sexual Activity    Alcohol use: No    Drug use: No    Sexual activity: Not Currently   Lifestyle    Physical activity     Days per week: Not on file     Minutes per session: Not on file    Stress: Not on file   Relationships    Social connections     Talks on phone: Not on file     Gets together: Not on file     Attends [employer] service: Not on file     Active member of club or organization: Not on file     Attends meetings of clubs or organizations: Not on file     Relationship status: Not on file    Intimate partner violence     Fear of current or ex partner: Not on file     Emotionally abused: Not on file     Physically abused: Not on file     Forced sexual activity: Not on file   Other Topics Concern    Not on file   Social History Narrative    Not on file     Current Outpatient Medications   Medication Sig Dispense Refill    metoprolol succinate (TOPROL-XL) 25 mg XL tablet TAKE 1/2 OF A TABLET BY MOUTH EVERY DAY. 45 Tab 11    atorvastatin (LIPITOR) 80 mg tablet Take 80 mg by mouth daily.  venlafaxine (EFFEXOR) 75 mg tablet TK 1 T PO BID 60 Tab 0    levothyroxine (SYNTHROID) 75 mcg tablet TAKE 1 TABLET BY MOUTH DAILY BEFORE BREAKFAST 90 Tab 0    baclofen (LIORESAL) 20 mg tablet Take 1 Tab by mouth three (3) times daily.  270 Tab 3    testosterone (ANDROGEL) 20.25 mg/1.25 gram (1.62 %) gel 60.75 mg by TransDERmal route daily.  cetirizine HCl (ZYRTEC PO) Take 1 Tab by mouth daily (after breakfast).  diphenhydrAMINE (BENADRYL) 25 mg capsule Take 25 mg by mouth daily.  aspirin delayed-release 81 mg tablet Take 1 Tab by mouth daily. 90 Tab 3    fluticasone (FLONASE) 50 mcg/actuation nasal spray One spray each nostril ldaly 1 Bottle 3    cholecalciferol (VITAMIN D3) 1,000 unit tablet Take 1 Tab by mouth daily. 30 Tab 3    diclofenac (VOLTAREN) 1 % gel Apply  to affected area four (4) times daily. (Patient taking differently: Apply 2 g to affected area four (4) times daily as needed for Cough, Diarrhea, Fever, Nausea or Pain.) 100 g 3       ROS   Review of Systems   Constitutional: Negative. HENT: Negative. Eyes: Negative. Respiratory: Negative. Cardiovascular: Negative. Gastrointestinal: Negative. Genitourinary: Negative. Skin:        Mass on R ankle   Neurological: Negative. Objective:  Vitals:    12/07/20 1050   BP: (!) 142/93   Pulse: 74   Resp: 24   Temp: 97 °F (36.1 °C)   TempSrc: Temporal   SpO2: 99%   Weight: 175 lb (79.4 kg)   Height: 5' 4\" (1.626 m)   PainSc:   6   PainLoc: Shoulder       Physical Exam  Vitals signs reviewed. Constitutional:       Appearance: Normal appearance. HENT:      Right Ear: Tympanic membrane, ear canal and external ear normal. There is no impacted cerumen. Left Ear: Tympanic membrane, ear canal and external ear normal. There is no impacted cerumen. Nose: Nose normal. No congestion or rhinorrhea. Mouth/Throat:      Mouth: Mucous membranes are moist.      Pharynx: Oropharynx is clear. No oropharyngeal exudate or posterior oropharyngeal erythema. Eyes:      General: No scleral icterus. Right eye: No discharge. Left eye: No discharge. Extraocular Movements: Extraocular movements intact. Neck:      Musculoskeletal: Normal range of motion and neck supple.    Cardiovascular:      Rate and Rhythm: Normal rate and regular rhythm. Heart sounds: No murmur. Pulmonary:      Effort: Pulmonary effort is normal. No respiratory distress. Breath sounds: Normal breath sounds. No stridor. No wheezing, rhonchi or rales. Chest:      Chest wall: No tenderness. Abdominal:      General: Abdomen is flat. Bowel sounds are normal.      Palpations: Abdomen is soft. Tenderness: There is no abdominal tenderness. Musculoskeletal: Normal range of motion. General: Swelling (small 2-3 cm diameter, fluctuant nodule. on lateral aspect of R ankle. No signs of infection. ) present. No tenderness, deformity or signs of injury. Right lower leg: No edema. Left lower leg: No edema. Lymphadenopathy:      Cervical: No cervical adenopathy. Skin:     General: Skin is warm and dry. Neurological:      Mental Status: He is alert and oriented to person, place, and time. Cranial Nerves: No cranial nerve deficit. Deep Tendon Reflexes: Reflexes normal.   Psychiatric:         Mood and Affect: Mood normal.         Behavior: Behavior normal.         Thought Content:  Thought content normal.         Judgment: Judgment normal.         LABS   10/13/2020  9:36 AM - Dg, [facility] Lab Results In     Component  Value  Flag  Ref Range  Units  Status    Hemoglobin A1c  5.1   4.8 - 5.6  %  Final     10/13/2020  9:36 AM - Dg, [facility] Lab Results In     Component  Value  Flag  Ref Range  Units  Status    Glucose  79   65 - 99  mg/dL  Final    BUN  15   6 - 24  mg/dL  Final    Creatinine  0.65  Low    0.76 - 1.27  mg/dL  Final    GFR est non-AA  117   >59  mL/min/1.73  Final    GFR est AA  135   >59  mL/min/1.73  Final    BUN/Creatinine ratio  23  High    9 - 20   Final    Sodium  143   134 - 144  mmol/L  Final    Potassium  4.1   3.5 - 5.2  mmol/L  Final    Chloride  105   96 - 106  mmol/L  Final    CO2  20   20 - 29  mmol/L  Final    Calcium  9.6   8.7 - 10.2  mg/dL  Final    10/13/2020  9:36 AM - Dg, [facility] Lab Results In     Component  Value  Flag  Ref Range  Units  Status    TSH  0.909   0.450 - 4.500  uIU/mL  Final         TESTS      Assessment/Plan:    1. Preop examination  Patient that he might need it an EKG for this procedure. However Dr. Mauri Phalen office was called and there was stated that this was not necessary. This individual is at low risk for cardiovascular event during the low risk surgery. The revised cardiovascular risk index is <2 and is associated with a <7 percent risk of major cardiovascular events. He has the following risk factors L eye blindness 2/2 optical glioma, hypertension,  hypothyroidism,and history of cerebrovascular disease. He has been medically optimized for the procedure. He  may proceed with surgery with no additional cardiac testing or procedures. Based on the Eloy Seton perioperative cardiac risk, patient's estimated risk probability for perioperative myocardial infarct or cardiac arrest is: 0.1%    Based on the Ban Dural criteria for cardiac risk (RCRI), the estimated risk of adverse outcome with non cardiac surgery is considered class II risk. The estimated rate of 30-day risk MI, pulmonary edema, ventricular fibrillation, cardiac arrest, or complete heart block is: 6.0%      2. Essential hypertension  Initial blood pressure was elevated likely secondary to patient having to exert himself to get to the room. However, I personally retook his blood pressure after have patient having been sit down for over 15 minutes and it was 118/77. I have discussed the diagnosis with the patient and the intended plan as seen in the above orders. The patient has received an after-visit summary. I have reviewed the plan of care with the patient, accepted their input and they are in agreement with the treatment goals. Alexandrea Infante MD      PLEASE NOTE:  This document has been produced using voice recognition software. Unrecognized errors in transcription may be present.

## 2021-01-08 DIAGNOSIS — E03.4 HYPOTHYROIDISM DUE TO ACQUIRED ATROPHY OF THYROID: Chronic | ICD-10-CM

## 2021-01-08 RX ORDER — VENLAFAXINE HYDROCHLORIDE 150 MG/1
CAPSULE, EXTENDED RELEASE ORAL
Qty: 90 CAP | Refills: 0 | Status: SHIPPED | OUTPATIENT
Start: 2021-01-08 | End: 2021-07-12

## 2021-01-08 RX ORDER — LEVOTHYROXINE SODIUM 75 UG/1
TABLET ORAL
Qty: 90 TAB | Refills: 0 | Status: SHIPPED | OUTPATIENT
Start: 2021-01-08

## 2021-03-09 ENCOUNTER — TELEPHONE (OUTPATIENT)
Dept: FAMILY MEDICINE CLINIC | Age: 46
End: 2021-03-09

## 2021-03-09 NOTE — TELEPHONE ENCOUNTER
Patient father called and stated son just had a tumor removed from his brain and he have a couple of questions to ask. Please give him a call.

## 2021-03-11 NOTE — TELEPHONE ENCOUNTER
Spoke with mary's dad, Gila Walters sr. Says friend tested positive 2 days ago, and was around their friend 3 days ago. Wondering if he can visit his son while he's recovering from brain tumor surgery. Daughter (pt's sister) is helping patient at hospital. Parents advised to stay home for at least 2 weeks. Verbalized understanding.

## 2021-07-12 RX ORDER — VENLAFAXINE HYDROCHLORIDE 150 MG/1
CAPSULE, EXTENDED RELEASE ORAL
Qty: 90 CAPSULE | Refills: 0 | Status: SHIPPED | OUTPATIENT
Start: 2021-07-12 | End: 2021-11-10

## 2021-10-10 RX ORDER — ATORVASTATIN CALCIUM 80 MG/1
TABLET, FILM COATED ORAL
Qty: 90 TABLET | Refills: 2 | Status: SHIPPED | OUTPATIENT
Start: 2021-10-10 | End: 2022-09-06

## 2021-11-10 RX ORDER — VENLAFAXINE HYDROCHLORIDE 150 MG/1
CAPSULE, EXTENDED RELEASE ORAL
Qty: 90 CAPSULE | Refills: 0 | Status: SHIPPED | OUTPATIENT
Start: 2021-11-10

## 2022-01-07 DIAGNOSIS — I69.354 HEMIPARESIS AFFECTING LEFT SIDE AS LATE EFFECT OF CEREBROVASCULAR ACCIDENT (CVA) (HCC): ICD-10-CM

## 2022-01-09 RX ORDER — BACLOFEN 20 MG/1
20 TABLET ORAL 3 TIMES DAILY
Qty: 270 TABLET | Refills: 1 | Status: SHIPPED | OUTPATIENT
Start: 2022-01-09

## 2022-03-18 PROBLEM — I51.7 SEVERE CONCENTRIC LEFT VENTRICULAR HYPERTROPHY: Status: ACTIVE | Noted: 2018-01-29

## 2022-03-18 PROBLEM — I66.01 STENOSIS OF RIGHT MIDDLE CEREBRAL ARTERY: Status: ACTIVE | Noted: 2018-01-29

## 2022-03-18 PROBLEM — R80.9 MICROALBUMINURIA: Status: ACTIVE | Noted: 2018-01-29

## 2022-03-18 PROBLEM — G47.33 OSA ON CPAP: Status: ACTIVE | Noted: 2019-05-13

## 2022-03-19 PROBLEM — Z85.09 HISTORY OF GASTROINTESTINAL STROMAL TUMOR (GIST): Status: ACTIVE | Noted: 2019-05-13

## 2022-03-19 PROBLEM — R91.1 LUNG NODULE: Status: ACTIVE | Noted: 2018-01-29

## 2022-03-19 PROBLEM — C72.30 GLIOMA OF OPTIC NERVE (HCC): Status: ACTIVE | Noted: 2018-01-29

## 2022-03-19 PROBLEM — E78.00 HYPERCHOLESTEREMIA: Status: ACTIVE | Noted: 2018-01-29

## 2022-03-19 PROBLEM — Q85.01 NEUROFIBROMATOSIS, TYPE 1 (HCC): Status: ACTIVE | Noted: 2019-05-13

## 2022-03-19 PROBLEM — I69.354 HEMIPARESIS AFFECTING LEFT SIDE AS LATE EFFECT OF CEREBROVASCULAR ACCIDENT (CVA) (HCC): Status: ACTIVE | Noted: 2020-02-24

## 2022-03-19 PROBLEM — I51.7 LVH (LEFT VENTRICULAR HYPERTROPHY): Status: ACTIVE | Noted: 2018-02-27

## 2022-03-19 PROBLEM — I65.21 STENOSIS OF RIGHT INTERNAL CAROTID ARTERY: Status: ACTIVE | Noted: 2019-05-13

## 2022-03-19 PROBLEM — E66.01 OBESITY, MORBID (HCC): Status: ACTIVE | Noted: 2018-01-29

## 2022-03-20 PROBLEM — E11.9 TYPE 2 DIABETES MELLITUS WITHOUT COMPLICATION, WITHOUT LONG-TERM CURRENT USE OF INSULIN (HCC): Status: ACTIVE | Noted: 2019-11-08

## 2022-03-20 PROBLEM — H54.40 BLIND LEFT EYE: Status: ACTIVE | Noted: 2018-01-29

## 2022-03-20 PROBLEM — R79.89 LOW TESTOSTERONE IN MALE: Status: ACTIVE | Noted: 2018-01-29

## 2022-06-24 ENCOUNTER — HOSPITAL ENCOUNTER (OUTPATIENT)
Dept: LAB | Age: 47
Discharge: HOME | End: 2022-06-24

## 2022-06-24 LAB — XX-LABCORP SPECIMEN COL,LCBCF: NORMAL

## 2022-06-24 PROCEDURE — 99001 SPECIMEN HANDLING PT-LAB: CPT

## 2022-08-18 ENCOUNTER — HOSPITAL ENCOUNTER (OUTPATIENT)
Dept: LAB | Age: 47
Discharge: HOME | End: 2022-08-18

## 2022-08-18 LAB — SENTARA SPECIMEN COL,SENBCF: NORMAL

## 2022-08-18 PROCEDURE — 99001 SPECIMEN HANDLING PT-LAB: CPT

## 2022-09-06 RX ORDER — ATORVASTATIN CALCIUM 80 MG/1
TABLET, FILM COATED ORAL
Qty: 90 TABLET | Refills: 2 | Status: SHIPPED | OUTPATIENT
Start: 2022-09-06

## 2023-01-04 NOTE — TELEPHONE ENCOUNTER
Spoke with Mr. Corazon Cerda and Mother, Ernestine Dexter, informed them of low TSH levels and levothyroxine being lowered from 100mcg to 75mcg. It was sent to [facility] on [facility] and [facility].  They verbalized understanding verbal cues/1 person assist

## 2023-11-07 ENCOUNTER — HOME HEALTH ADMISSION (OUTPATIENT)
Age: 48
End: 2023-11-07